# Patient Record
Sex: MALE | Race: WHITE | Employment: UNEMPLOYED | URBAN - NONMETROPOLITAN AREA
[De-identification: names, ages, dates, MRNs, and addresses within clinical notes are randomized per-mention and may not be internally consistent; named-entity substitution may affect disease eponyms.]

---

## 2019-09-20 ENCOUNTER — HOSPITAL ENCOUNTER (OUTPATIENT)
Age: 24
Discharge: HOME | End: 2019-09-20
Payer: MEDICARE

## 2019-09-20 ENCOUNTER — OFFICE VISIT (OUTPATIENT)
Dept: INTERNAL MEDICINE CLINIC | Age: 24
End: 2019-09-20
Payer: MEDICARE

## 2019-09-20 VITALS
DIASTOLIC BLOOD PRESSURE: 79 MMHG | HEIGHT: 72 IN | BODY MASS INDEX: 22.21 KG/M2 | WEIGHT: 164 LBS | SYSTOLIC BLOOD PRESSURE: 127 MMHG | HEART RATE: 99 BPM

## 2019-09-20 DIAGNOSIS — F11.20 SEVERE OPIOID USE DISORDER (HCC): ICD-10-CM

## 2019-09-20 DIAGNOSIS — F11.20 SEVERE OPIOID USE DISORDER (HCC): Primary | ICD-10-CM

## 2019-09-20 DIAGNOSIS — F32.89 OTHER DEPRESSION: ICD-10-CM

## 2019-09-20 LAB
AMPHETAMINE SCREEN, URINE: ABNORMAL
BARBITURATE SCREEN, URINE: ABNORMAL
BENZODIAZEPINE SCREEN, URINE: ABNORMAL
BUPRENORPHINE URINE: ABNORMAL
COCAINE METABOLITE SCREEN URINE: ABNORMAL
GABAPENTIN SCREEN, URINE: ABNORMAL
HEPATITIS C ANTIBODY: NEGATIVE
MDMA URINE: ABNORMAL
METHADONE SCREEN, URINE: ABNORMAL
METHAMPHETAMINE, URINE: ABNORMAL
OPIATE SCREEN URINE: ABNORMAL
OXYCODONE SCREEN URINE: ABNORMAL
PHENCYCLIDINE SCREEN URINE: ABNORMAL
PROPOXYPHENE SCREEN, URINE: ABNORMAL
THC SCREEN, URINE: ABNORMAL
TRICYCLIC ANTIDEPRESSANTS, UR: ABNORMAL

## 2019-09-20 PROCEDURE — 99204 OFFICE O/P NEW MOD 45 MIN: CPT | Performed by: INTERNAL MEDICINE

## 2019-09-20 PROCEDURE — 84403 ASSAY OF TOTAL TESTOSTERONE: CPT

## 2019-09-20 PROCEDURE — 4004F PT TOBACCO SCREEN RCVD TLK: CPT | Performed by: INTERNAL MEDICINE

## 2019-09-20 PROCEDURE — 87389 HIV-1 AG W/HIV-1&-2 AB AG IA: CPT

## 2019-09-20 PROCEDURE — 80305 DRUG TEST PRSMV DIR OPT OBS: CPT | Performed by: INTERNAL MEDICINE

## 2019-09-20 PROCEDURE — G8427 DOCREV CUR MEDS BY ELIG CLIN: HCPCS | Performed by: INTERNAL MEDICINE

## 2019-09-20 PROCEDURE — G8420 CALC BMI NORM PARAMETERS: HCPCS | Performed by: INTERNAL MEDICINE

## 2019-09-20 PROCEDURE — 86803 HEPATITIS C AB TEST: CPT

## 2019-09-20 PROCEDURE — 36415 COLL VENOUS BLD VENIPUNCTURE: CPT

## 2019-09-20 RX ORDER — BUSPIRONE HYDROCHLORIDE 15 MG/1
15 TABLET ORAL 2 TIMES DAILY
COMMUNITY
End: 2020-02-04

## 2019-09-20 RX ORDER — MIRTAZAPINE 15 MG/1
15 TABLET, FILM COATED ORAL PRN
COMMUNITY
Start: 2019-06-19 | End: 2020-01-23 | Stop reason: SDUPTHER

## 2019-09-20 RX ORDER — ESCITALOPRAM OXALATE 10 MG/1
10 TABLET ORAL DAILY
Qty: 15 TABLET | Refills: 0 | Status: SHIPPED | OUTPATIENT
Start: 2019-09-20 | End: 2019-10-24 | Stop reason: SDUPTHER

## 2019-09-20 RX ORDER — BUPROPION HYDROCHLORIDE 150 MG/1
150 TABLET ORAL DAILY
Qty: 15 TABLET | Refills: 0 | Status: SHIPPED | OUTPATIENT
Start: 2019-09-20 | End: 2019-10-24 | Stop reason: SDUPTHER

## 2019-09-22 LAB — HIV-2 AB: NEGATIVE

## 2019-09-23 LAB — TESTOSTERONE TOTAL: 365 NG/DL (ref 300–1080)

## 2019-09-26 ENCOUNTER — OFFICE VISIT (OUTPATIENT)
Dept: INTERNAL MEDICINE CLINIC | Age: 24
End: 2019-09-26
Payer: MEDICARE

## 2019-09-26 VITALS
HEIGHT: 72 IN | DIASTOLIC BLOOD PRESSURE: 75 MMHG | BODY MASS INDEX: 22.48 KG/M2 | HEART RATE: 82 BPM | SYSTOLIC BLOOD PRESSURE: 121 MMHG | WEIGHT: 166 LBS

## 2019-09-26 DIAGNOSIS — Z51.81 ENCOUNTER FOR MONITORING SUBOXONE MAINTENANCE THERAPY: ICD-10-CM

## 2019-09-26 DIAGNOSIS — Z79.899 ENCOUNTER FOR MONITORING SUBOXONE MAINTENANCE THERAPY: ICD-10-CM

## 2019-09-26 DIAGNOSIS — F11.20 SEVERE OPIOID USE DISORDER (HCC): Primary | ICD-10-CM

## 2019-09-26 DIAGNOSIS — F32.89 OTHER DEPRESSION: ICD-10-CM

## 2019-09-26 LAB
AMPHETAMINE SCREEN, URINE: ABNORMAL
BARBITURATE SCREEN, URINE: ABNORMAL
BENZODIAZEPINE SCREEN, URINE: ABNORMAL
BUPRENORPHINE URINE: ABNORMAL
COCAINE METABOLITE SCREEN URINE: ABNORMAL
GABAPENTIN SCREEN, URINE: ABNORMAL
MDMA URINE: ABNORMAL
METHADONE SCREEN, URINE: ABNORMAL
METHAMPHETAMINE, URINE: ABNORMAL
OPIATE SCREEN URINE: ABNORMAL
OXYCODONE SCREEN URINE: ABNORMAL
PHENCYCLIDINE SCREEN URINE: ABNORMAL
PROPOXYPHENE SCREEN, URINE: ABNORMAL
THC SCREEN, URINE: ABNORMAL
TRICYCLIC ANTIDEPRESSANTS, UR: ABNORMAL

## 2019-09-26 PROCEDURE — 4004F PT TOBACCO SCREEN RCVD TLK: CPT | Performed by: INTERNAL MEDICINE

## 2019-09-26 PROCEDURE — G8427 DOCREV CUR MEDS BY ELIG CLIN: HCPCS | Performed by: INTERNAL MEDICINE

## 2019-09-26 PROCEDURE — G8420 CALC BMI NORM PARAMETERS: HCPCS | Performed by: INTERNAL MEDICINE

## 2019-09-26 PROCEDURE — 80305 DRUG TEST PRSMV DIR OPT OBS: CPT | Performed by: INTERNAL MEDICINE

## 2019-09-26 PROCEDURE — 99213 OFFICE O/P EST LOW 20 MIN: CPT | Performed by: INTERNAL MEDICINE

## 2019-09-26 RX ORDER — BUPRENORPHINE AND NALOXONE 12; 3 MG/1; MG/1
1 FILM, SOLUBLE BUCCAL; SUBLINGUAL DAILY
Qty: 7 FILM | Refills: 0 | Status: SHIPPED | OUTPATIENT
Start: 2019-09-26 | End: 2019-10-03 | Stop reason: SDUPTHER

## 2019-09-26 RX ORDER — BUPRENORPHINE AND NALOXONE 12; 3 MG/1; MG/1
1 FILM, SOLUBLE BUCCAL; SUBLINGUAL DAILY
COMMUNITY
End: 2019-09-26 | Stop reason: SDUPTHER

## 2019-10-03 ENCOUNTER — OFFICE VISIT (OUTPATIENT)
Dept: INTERNAL MEDICINE CLINIC | Age: 24
End: 2019-10-03
Payer: MEDICARE

## 2019-10-03 VITALS
BODY MASS INDEX: 23.7 KG/M2 | HEIGHT: 72 IN | DIASTOLIC BLOOD PRESSURE: 89 MMHG | HEART RATE: 79 BPM | WEIGHT: 175 LBS | SYSTOLIC BLOOD PRESSURE: 150 MMHG

## 2019-10-03 DIAGNOSIS — Z79.899 ENCOUNTER FOR MONITORING SUBOXONE MAINTENANCE THERAPY: ICD-10-CM

## 2019-10-03 DIAGNOSIS — F11.20 SEVERE OPIOID USE DISORDER (HCC): Primary | ICD-10-CM

## 2019-10-03 DIAGNOSIS — Z51.81 ENCOUNTER FOR MONITORING SUBOXONE MAINTENANCE THERAPY: ICD-10-CM

## 2019-10-03 PROCEDURE — G8427 DOCREV CUR MEDS BY ELIG CLIN: HCPCS | Performed by: INTERNAL MEDICINE

## 2019-10-03 PROCEDURE — G8484 FLU IMMUNIZE NO ADMIN: HCPCS | Performed by: INTERNAL MEDICINE

## 2019-10-03 PROCEDURE — 80305 DRUG TEST PRSMV DIR OPT OBS: CPT | Performed by: INTERNAL MEDICINE

## 2019-10-03 PROCEDURE — G8420 CALC BMI NORM PARAMETERS: HCPCS | Performed by: INTERNAL MEDICINE

## 2019-10-03 PROCEDURE — 4004F PT TOBACCO SCREEN RCVD TLK: CPT | Performed by: INTERNAL MEDICINE

## 2019-10-03 PROCEDURE — 99213 OFFICE O/P EST LOW 20 MIN: CPT | Performed by: INTERNAL MEDICINE

## 2019-10-03 RX ORDER — BUPRENORPHINE AND NALOXONE 12; 3 MG/1; MG/1
1 FILM, SOLUBLE BUCCAL; SUBLINGUAL DAILY
Qty: 7 FILM | Refills: 0 | Status: SHIPPED | OUTPATIENT
Start: 2019-10-03 | End: 2019-10-10 | Stop reason: SDUPTHER

## 2019-10-10 ENCOUNTER — OFFICE VISIT (OUTPATIENT)
Dept: INTERNAL MEDICINE CLINIC | Age: 24
End: 2019-10-10
Payer: MEDICARE

## 2019-10-10 VITALS
SYSTOLIC BLOOD PRESSURE: 137 MMHG | HEART RATE: 92 BPM | WEIGHT: 167 LBS | BODY MASS INDEX: 22.62 KG/M2 | HEIGHT: 72 IN | DIASTOLIC BLOOD PRESSURE: 75 MMHG

## 2019-10-10 DIAGNOSIS — F11.20 SEVERE OPIOID USE DISORDER (HCC): Primary | ICD-10-CM

## 2019-10-10 DIAGNOSIS — Z51.81 ENCOUNTER FOR MONITORING SUBOXONE MAINTENANCE THERAPY: ICD-10-CM

## 2019-10-10 DIAGNOSIS — Z79.899 ENCOUNTER FOR MONITORING SUBOXONE MAINTENANCE THERAPY: ICD-10-CM

## 2019-10-10 PROCEDURE — 99213 OFFICE O/P EST LOW 20 MIN: CPT | Performed by: INTERNAL MEDICINE

## 2019-10-10 PROCEDURE — G8420 CALC BMI NORM PARAMETERS: HCPCS | Performed by: INTERNAL MEDICINE

## 2019-10-10 PROCEDURE — G8428 CUR MEDS NOT DOCUMENT: HCPCS | Performed by: INTERNAL MEDICINE

## 2019-10-10 PROCEDURE — 80305 DRUG TEST PRSMV DIR OPT OBS: CPT | Performed by: INTERNAL MEDICINE

## 2019-10-10 PROCEDURE — 4004F PT TOBACCO SCREEN RCVD TLK: CPT | Performed by: INTERNAL MEDICINE

## 2019-10-10 PROCEDURE — G8484 FLU IMMUNIZE NO ADMIN: HCPCS | Performed by: INTERNAL MEDICINE

## 2019-10-10 RX ORDER — BUPRENORPHINE AND NALOXONE 12; 3 MG/1; MG/1
1 FILM, SOLUBLE BUCCAL; SUBLINGUAL DAILY
Qty: 7 FILM | Refills: 0 | Status: SHIPPED | OUTPATIENT
Start: 2019-10-10 | End: 2019-10-17 | Stop reason: SDUPTHER

## 2019-10-17 ENCOUNTER — OFFICE VISIT (OUTPATIENT)
Dept: INTERNAL MEDICINE CLINIC | Age: 24
End: 2019-10-17
Payer: MEDICARE

## 2019-10-17 VITALS
WEIGHT: 172 LBS | BODY MASS INDEX: 23.3 KG/M2 | SYSTOLIC BLOOD PRESSURE: 135 MMHG | HEART RATE: 76 BPM | HEIGHT: 72 IN | DIASTOLIC BLOOD PRESSURE: 80 MMHG

## 2019-10-17 DIAGNOSIS — F11.20 SEVERE OPIOID USE DISORDER (HCC): Primary | ICD-10-CM

## 2019-10-17 DIAGNOSIS — Z51.81 ENCOUNTER FOR MONITORING SUBOXONE MAINTENANCE THERAPY: ICD-10-CM

## 2019-10-17 DIAGNOSIS — Z79.899 ENCOUNTER FOR MONITORING SUBOXONE MAINTENANCE THERAPY: ICD-10-CM

## 2019-10-17 DIAGNOSIS — F19.10 POLYSUBSTANCE ABUSE (HCC): ICD-10-CM

## 2019-10-17 PROCEDURE — 4004F PT TOBACCO SCREEN RCVD TLK: CPT | Performed by: INTERNAL MEDICINE

## 2019-10-17 PROCEDURE — G8427 DOCREV CUR MEDS BY ELIG CLIN: HCPCS | Performed by: INTERNAL MEDICINE

## 2019-10-17 PROCEDURE — 80305 DRUG TEST PRSMV DIR OPT OBS: CPT | Performed by: INTERNAL MEDICINE

## 2019-10-17 PROCEDURE — 99213 OFFICE O/P EST LOW 20 MIN: CPT | Performed by: INTERNAL MEDICINE

## 2019-10-17 PROCEDURE — G8484 FLU IMMUNIZE NO ADMIN: HCPCS | Performed by: INTERNAL MEDICINE

## 2019-10-17 PROCEDURE — G8420 CALC BMI NORM PARAMETERS: HCPCS | Performed by: INTERNAL MEDICINE

## 2019-10-17 RX ORDER — BUPRENORPHINE AND NALOXONE 12; 3 MG/1; MG/1
1 FILM, SOLUBLE BUCCAL; SUBLINGUAL DAILY
Qty: 4 FILM | Refills: 0 | Status: SHIPPED | OUTPATIENT
Start: 2019-10-17 | End: 2019-10-21

## 2019-10-24 ENCOUNTER — OFFICE VISIT (OUTPATIENT)
Dept: INTERNAL MEDICINE CLINIC | Age: 24
End: 2019-10-24
Payer: MEDICARE

## 2019-10-24 VITALS
HEART RATE: 84 BPM | WEIGHT: 171 LBS | BODY MASS INDEX: 23.16 KG/M2 | DIASTOLIC BLOOD PRESSURE: 67 MMHG | SYSTOLIC BLOOD PRESSURE: 114 MMHG | HEIGHT: 72 IN

## 2019-10-24 DIAGNOSIS — Z79.899 ENCOUNTER FOR MONITORING SUBOXONE MAINTENANCE THERAPY: ICD-10-CM

## 2019-10-24 DIAGNOSIS — F11.20 SEVERE OPIOID USE DISORDER (HCC): Primary | ICD-10-CM

## 2019-10-24 DIAGNOSIS — F32.89 OTHER DEPRESSION: ICD-10-CM

## 2019-10-24 DIAGNOSIS — F19.10 POLYSUBSTANCE ABUSE (HCC): ICD-10-CM

## 2019-10-24 DIAGNOSIS — Z51.81 ENCOUNTER FOR MONITORING SUBOXONE MAINTENANCE THERAPY: ICD-10-CM

## 2019-10-24 PROCEDURE — 99213 OFFICE O/P EST LOW 20 MIN: CPT | Performed by: INTERNAL MEDICINE

## 2019-10-24 PROCEDURE — 4004F PT TOBACCO SCREEN RCVD TLK: CPT | Performed by: INTERNAL MEDICINE

## 2019-10-24 PROCEDURE — G8484 FLU IMMUNIZE NO ADMIN: HCPCS | Performed by: INTERNAL MEDICINE

## 2019-10-24 PROCEDURE — G8427 DOCREV CUR MEDS BY ELIG CLIN: HCPCS | Performed by: INTERNAL MEDICINE

## 2019-10-24 PROCEDURE — 80305 DRUG TEST PRSMV DIR OPT OBS: CPT | Performed by: INTERNAL MEDICINE

## 2019-10-24 PROCEDURE — G8420 CALC BMI NORM PARAMETERS: HCPCS | Performed by: INTERNAL MEDICINE

## 2019-10-24 RX ORDER — BUPROPION HYDROCHLORIDE 150 MG/1
150 TABLET ORAL DAILY
Qty: 15 TABLET | Refills: 0 | Status: SHIPPED | OUTPATIENT
Start: 2019-10-24 | End: 2019-11-27 | Stop reason: SDUPTHER

## 2019-10-24 RX ORDER — BUPRENORPHINE AND NALOXONE 12; 3 MG/1; MG/1
1 FILM, SOLUBLE BUCCAL; SUBLINGUAL DAILY
COMMUNITY
End: 2019-10-24 | Stop reason: SDUPTHER

## 2019-10-24 RX ORDER — ESCITALOPRAM OXALATE 10 MG/1
10 TABLET ORAL DAILY
Qty: 15 TABLET | Refills: 0 | Status: SHIPPED | OUTPATIENT
Start: 2019-10-24 | End: 2019-11-27 | Stop reason: SDUPTHER

## 2019-10-24 RX ORDER — BUPRENORPHINE AND NALOXONE 12; 3 MG/1; MG/1
1 FILM, SOLUBLE BUCCAL; SUBLINGUAL DAILY
Qty: 7 FILM | Refills: 0 | Status: SHIPPED | OUTPATIENT
Start: 2019-10-24 | End: 2019-10-31 | Stop reason: SDUPTHER

## 2019-10-31 ENCOUNTER — OFFICE VISIT (OUTPATIENT)
Dept: INTERNAL MEDICINE CLINIC | Age: 24
End: 2019-10-31
Payer: MEDICARE

## 2019-10-31 VITALS
BODY MASS INDEX: 24.24 KG/M2 | DIASTOLIC BLOOD PRESSURE: 77 MMHG | HEIGHT: 72 IN | SYSTOLIC BLOOD PRESSURE: 133 MMHG | HEART RATE: 101 BPM | WEIGHT: 179 LBS

## 2019-10-31 DIAGNOSIS — Z51.81 ENCOUNTER FOR MONITORING SUBOXONE MAINTENANCE THERAPY: ICD-10-CM

## 2019-10-31 DIAGNOSIS — F11.20 SEVERE OPIOID USE DISORDER (HCC): Primary | ICD-10-CM

## 2019-10-31 DIAGNOSIS — F19.10 POLYSUBSTANCE ABUSE (HCC): ICD-10-CM

## 2019-10-31 DIAGNOSIS — Z79.899 ENCOUNTER FOR MONITORING SUBOXONE MAINTENANCE THERAPY: ICD-10-CM

## 2019-10-31 PROCEDURE — G8484 FLU IMMUNIZE NO ADMIN: HCPCS | Performed by: INTERNAL MEDICINE

## 2019-10-31 PROCEDURE — 4004F PT TOBACCO SCREEN RCVD TLK: CPT | Performed by: INTERNAL MEDICINE

## 2019-10-31 PROCEDURE — G8420 CALC BMI NORM PARAMETERS: HCPCS | Performed by: INTERNAL MEDICINE

## 2019-10-31 PROCEDURE — 99213 OFFICE O/P EST LOW 20 MIN: CPT | Performed by: INTERNAL MEDICINE

## 2019-10-31 PROCEDURE — 80305 DRUG TEST PRSMV DIR OPT OBS: CPT | Performed by: INTERNAL MEDICINE

## 2019-10-31 PROCEDURE — G8427 DOCREV CUR MEDS BY ELIG CLIN: HCPCS | Performed by: INTERNAL MEDICINE

## 2019-10-31 RX ORDER — BUPRENORPHINE AND NALOXONE 12; 3 MG/1; MG/1
1 FILM, SOLUBLE BUCCAL; SUBLINGUAL DAILY
Qty: 7 FILM | Refills: 0 | Status: SHIPPED | OUTPATIENT
Start: 2019-10-31 | End: 2019-10-31

## 2019-10-31 RX ORDER — BUPRENORPHINE AND NALOXONE 12; 3 MG/1; MG/1
1 FILM, SOLUBLE BUCCAL; SUBLINGUAL DAILY
Qty: 7 FILM | Refills: 0 | Status: SHIPPED | OUTPATIENT
Start: 2019-10-31 | End: 2019-11-07

## 2019-11-06 ENCOUNTER — TELEPHONE (OUTPATIENT)
Dept: INTERNAL MEDICINE CLINIC | Age: 24
End: 2019-11-06

## 2019-11-07 DIAGNOSIS — F11.20 SEVERE OPIOID USE DISORDER (HCC): Primary | ICD-10-CM

## 2019-11-07 DIAGNOSIS — F41.9 ANXIETY: ICD-10-CM

## 2019-11-07 RX ORDER — HYDROXYZINE HYDROCHLORIDE 25 MG/1
25 TABLET, FILM COATED ORAL EVERY 4 HOURS PRN
Qty: 20 TABLET | Refills: 0 | OUTPATIENT
Start: 2019-11-07 | End: 2019-11-27

## 2019-11-08 ENCOUNTER — TELEPHONE (OUTPATIENT)
Dept: INTERNAL MEDICINE CLINIC | Age: 24
End: 2019-11-08

## 2019-11-11 ENCOUNTER — OFFICE VISIT (OUTPATIENT)
Dept: INTERNAL MEDICINE CLINIC | Age: 24
End: 2019-11-11
Payer: MEDICARE

## 2019-11-11 VITALS
HEART RATE: 84 BPM | SYSTOLIC BLOOD PRESSURE: 134 MMHG | BODY MASS INDEX: 23.06 KG/M2 | WEIGHT: 170 LBS | DIASTOLIC BLOOD PRESSURE: 87 MMHG

## 2019-11-11 DIAGNOSIS — F11.20 SEVERE OPIOID USE DISORDER (HCC): Primary | ICD-10-CM

## 2019-11-11 DIAGNOSIS — Z51.81 ENCOUNTER FOR MONITORING SUBOXONE MAINTENANCE THERAPY: ICD-10-CM

## 2019-11-11 DIAGNOSIS — F41.9 ANXIETY: ICD-10-CM

## 2019-11-11 DIAGNOSIS — Z79.899 ENCOUNTER FOR MONITORING SUBOXONE MAINTENANCE THERAPY: ICD-10-CM

## 2019-11-11 DIAGNOSIS — F19.10 POLYSUBSTANCE ABUSE (HCC): ICD-10-CM

## 2019-11-11 PROCEDURE — 4004F PT TOBACCO SCREEN RCVD TLK: CPT | Performed by: INTERNAL MEDICINE

## 2019-11-11 PROCEDURE — G8427 DOCREV CUR MEDS BY ELIG CLIN: HCPCS | Performed by: INTERNAL MEDICINE

## 2019-11-11 PROCEDURE — G8420 CALC BMI NORM PARAMETERS: HCPCS | Performed by: INTERNAL MEDICINE

## 2019-11-11 PROCEDURE — G8484 FLU IMMUNIZE NO ADMIN: HCPCS | Performed by: INTERNAL MEDICINE

## 2019-11-11 PROCEDURE — 99213 OFFICE O/P EST LOW 20 MIN: CPT | Performed by: INTERNAL MEDICINE

## 2019-11-11 PROCEDURE — 80305 DRUG TEST PRSMV DIR OPT OBS: CPT | Performed by: INTERNAL MEDICINE

## 2019-11-11 RX ORDER — BUPRENORPHINE AND NALOXONE 8; 2 MG/1; MG/1
1 FILM, SOLUBLE BUCCAL; SUBLINGUAL 2 TIMES DAILY
Qty: 14 FILM | Refills: 0 | Status: SHIPPED | OUTPATIENT
Start: 2019-11-11 | End: 2019-11-18

## 2019-11-11 RX ORDER — BUPRENORPHINE AND NALOXONE 8; 2 MG/1; MG/1
FILM, SOLUBLE BUCCAL; SUBLINGUAL
Refills: 0 | COMMUNITY
Start: 2019-11-06 | End: 2019-11-11 | Stop reason: SDUPTHER

## 2019-11-11 RX ORDER — BUPRENORPHINE AND NALOXONE 8; 2 MG/1; MG/1
1 FILM, SOLUBLE BUCCAL; SUBLINGUAL 2 TIMES DAILY
Qty: 14 FILM | Refills: 0 | Status: SHIPPED | OUTPATIENT
Start: 2019-11-11 | End: 2019-11-11

## 2019-11-15 LAB
BUPRENORPHINE GLUCURONIDE URINE: 151 NG/ML
BUPRENORPHINE URINE: < 2 NG/ML
NALOXONE URINE: < 100 NG/ML
NORBUPRENORPHINE GLUCURONIDE URINE: 442 NG/ML
NORBUPRENORPHINE, URINE: 112 NG/ML

## 2019-11-19 ENCOUNTER — OFFICE VISIT (OUTPATIENT)
Dept: INTERNAL MEDICINE CLINIC | Age: 24
End: 2019-11-19
Payer: MEDICARE

## 2019-11-19 VITALS
BODY MASS INDEX: 22.62 KG/M2 | SYSTOLIC BLOOD PRESSURE: 138 MMHG | DIASTOLIC BLOOD PRESSURE: 88 MMHG | WEIGHT: 167 LBS | HEART RATE: 109 BPM | HEIGHT: 72 IN

## 2019-11-19 DIAGNOSIS — Z79.899 ENCOUNTER FOR MONITORING SUBOXONE MAINTENANCE THERAPY: ICD-10-CM

## 2019-11-19 DIAGNOSIS — F19.10 POLYSUBSTANCE ABUSE (HCC): ICD-10-CM

## 2019-11-19 DIAGNOSIS — F41.9 ANXIETY: ICD-10-CM

## 2019-11-19 DIAGNOSIS — F11.20 SEVERE OPIOID USE DISORDER (HCC): Primary | ICD-10-CM

## 2019-11-19 DIAGNOSIS — Z51.81 ENCOUNTER FOR MONITORING SUBOXONE MAINTENANCE THERAPY: ICD-10-CM

## 2019-11-19 PROCEDURE — 80305 DRUG TEST PRSMV DIR OPT OBS: CPT | Performed by: INTERNAL MEDICINE

## 2019-11-19 PROCEDURE — G8427 DOCREV CUR MEDS BY ELIG CLIN: HCPCS | Performed by: INTERNAL MEDICINE

## 2019-11-19 PROCEDURE — 99213 OFFICE O/P EST LOW 20 MIN: CPT | Performed by: INTERNAL MEDICINE

## 2019-11-19 PROCEDURE — G8484 FLU IMMUNIZE NO ADMIN: HCPCS | Performed by: INTERNAL MEDICINE

## 2019-11-19 PROCEDURE — G8420 CALC BMI NORM PARAMETERS: HCPCS | Performed by: INTERNAL MEDICINE

## 2019-11-19 PROCEDURE — 4004F PT TOBACCO SCREEN RCVD TLK: CPT | Performed by: INTERNAL MEDICINE

## 2019-11-19 RX ORDER — BUPRENORPHINE AND NALOXONE 8; 2 MG/1; MG/1
1 FILM, SOLUBLE BUCCAL; SUBLINGUAL 2 TIMES DAILY
COMMUNITY
End: 2019-11-19 | Stop reason: SDUPTHER

## 2019-11-19 RX ORDER — BUPRENORPHINE AND NALOXONE 8; 2 MG/1; MG/1
1 FILM, SOLUBLE BUCCAL; SUBLINGUAL 2 TIMES DAILY
Qty: 14 FILM | Refills: 0 | Status: SHIPPED | OUTPATIENT
Start: 2019-11-19 | End: 2019-11-26

## 2019-11-27 ENCOUNTER — OFFICE VISIT (OUTPATIENT)
Dept: INTERNAL MEDICINE CLINIC | Age: 24
End: 2019-11-27
Payer: MEDICARE

## 2019-11-27 VITALS
HEIGHT: 72 IN | SYSTOLIC BLOOD PRESSURE: 132 MMHG | HEART RATE: 94 BPM | BODY MASS INDEX: 23.7 KG/M2 | WEIGHT: 175 LBS | DIASTOLIC BLOOD PRESSURE: 74 MMHG

## 2019-11-27 DIAGNOSIS — Z79.899 ENCOUNTER FOR MONITORING SUBOXONE MAINTENANCE THERAPY: ICD-10-CM

## 2019-11-27 DIAGNOSIS — F32.89 OTHER DEPRESSION: ICD-10-CM

## 2019-11-27 DIAGNOSIS — F11.20 SEVERE OPIOID USE DISORDER (HCC): Primary | ICD-10-CM

## 2019-11-27 DIAGNOSIS — Z51.81 ENCOUNTER FOR MONITORING SUBOXONE MAINTENANCE THERAPY: ICD-10-CM

## 2019-11-27 DIAGNOSIS — F19.10 POLYSUBSTANCE ABUSE (HCC): ICD-10-CM

## 2019-11-27 DIAGNOSIS — F41.9 ANXIETY: ICD-10-CM

## 2019-11-27 PROCEDURE — 99213 OFFICE O/P EST LOW 20 MIN: CPT | Performed by: INTERNAL MEDICINE

## 2019-11-27 PROCEDURE — G8420 CALC BMI NORM PARAMETERS: HCPCS | Performed by: INTERNAL MEDICINE

## 2019-11-27 PROCEDURE — 80305 DRUG TEST PRSMV DIR OPT OBS: CPT | Performed by: INTERNAL MEDICINE

## 2019-11-27 PROCEDURE — G8427 DOCREV CUR MEDS BY ELIG CLIN: HCPCS | Performed by: INTERNAL MEDICINE

## 2019-11-27 PROCEDURE — G8484 FLU IMMUNIZE NO ADMIN: HCPCS | Performed by: INTERNAL MEDICINE

## 2019-11-27 PROCEDURE — 4004F PT TOBACCO SCREEN RCVD TLK: CPT | Performed by: INTERNAL MEDICINE

## 2019-11-27 RX ORDER — BUPRENORPHINE AND NALOXONE 8; 2 MG/1; MG/1
1 FILM, SOLUBLE BUCCAL; SUBLINGUAL 2 TIMES DAILY
Qty: 24 FILM | Refills: 0 | Status: SHIPPED | OUTPATIENT
Start: 2019-11-27 | End: 2019-12-09

## 2019-11-27 RX ORDER — BUPROPION HYDROCHLORIDE 150 MG/1
150 TABLET ORAL DAILY
Qty: 15 TABLET | Refills: 0 | Status: SHIPPED | OUTPATIENT
Start: 2019-11-27 | End: 2019-12-26 | Stop reason: SDUPTHER

## 2019-11-27 RX ORDER — ESCITALOPRAM OXALATE 10 MG/1
10 TABLET ORAL DAILY
Qty: 15 TABLET | Refills: 0 | Status: SHIPPED | OUTPATIENT
Start: 2019-11-27 | End: 2019-12-26 | Stop reason: SDUPTHER

## 2019-11-27 RX ORDER — BUPRENORPHINE AND NALOXONE 8; 2 MG/1; MG/1
1 FILM, SOLUBLE BUCCAL; SUBLINGUAL 2 TIMES DAILY
COMMUNITY
End: 2019-11-27 | Stop reason: SDUPTHER

## 2019-12-11 ENCOUNTER — OFFICE VISIT (OUTPATIENT)
Dept: INTERNAL MEDICINE CLINIC | Age: 24
End: 2019-12-11
Payer: MEDICARE

## 2019-12-11 VITALS
SYSTOLIC BLOOD PRESSURE: 132 MMHG | BODY MASS INDEX: 23.03 KG/M2 | HEART RATE: 98 BPM | HEIGHT: 72 IN | WEIGHT: 170 LBS | DIASTOLIC BLOOD PRESSURE: 78 MMHG

## 2019-12-11 DIAGNOSIS — Z51.81 ENCOUNTER FOR MONITORING SUBOXONE MAINTENANCE THERAPY: ICD-10-CM

## 2019-12-11 DIAGNOSIS — F11.20 SEVERE OPIOID USE DISORDER (HCC): Primary | ICD-10-CM

## 2019-12-11 DIAGNOSIS — Z79.899 ENCOUNTER FOR MONITORING SUBOXONE MAINTENANCE THERAPY: ICD-10-CM

## 2019-12-11 DIAGNOSIS — F19.10 POLYSUBSTANCE ABUSE (HCC): ICD-10-CM

## 2019-12-11 PROCEDURE — 4004F PT TOBACCO SCREEN RCVD TLK: CPT | Performed by: INTERNAL MEDICINE

## 2019-12-11 PROCEDURE — G8484 FLU IMMUNIZE NO ADMIN: HCPCS | Performed by: INTERNAL MEDICINE

## 2019-12-11 PROCEDURE — 80305 DRUG TEST PRSMV DIR OPT OBS: CPT | Performed by: INTERNAL MEDICINE

## 2019-12-11 PROCEDURE — 99213 OFFICE O/P EST LOW 20 MIN: CPT | Performed by: INTERNAL MEDICINE

## 2019-12-11 PROCEDURE — G8420 CALC BMI NORM PARAMETERS: HCPCS | Performed by: INTERNAL MEDICINE

## 2019-12-11 PROCEDURE — G8427 DOCREV CUR MEDS BY ELIG CLIN: HCPCS | Performed by: INTERNAL MEDICINE

## 2019-12-11 RX ORDER — BUPRENORPHINE AND NALOXONE 8; 2 MG/1; MG/1
1 FILM, SOLUBLE BUCCAL; SUBLINGUAL 2 TIMES DAILY
COMMUNITY
End: 2019-12-11 | Stop reason: SDUPTHER

## 2019-12-11 RX ORDER — BUPRENORPHINE AND NALOXONE 8; 2 MG/1; MG/1
1 FILM, SOLUBLE BUCCAL; SUBLINGUAL 2 TIMES DAILY
Qty: 30 FILM | Refills: 0 | Status: SHIPPED | OUTPATIENT
Start: 2019-12-11 | End: 2019-12-26 | Stop reason: SDUPTHER

## 2019-12-26 ENCOUNTER — OFFICE VISIT (OUTPATIENT)
Dept: INTERNAL MEDICINE CLINIC | Age: 24
End: 2019-12-26
Payer: MEDICARE

## 2019-12-26 DIAGNOSIS — Z79.899 ENCOUNTER FOR MONITORING SUBOXONE MAINTENANCE THERAPY: ICD-10-CM

## 2019-12-26 DIAGNOSIS — F19.10 POLYSUBSTANCE ABUSE (HCC): ICD-10-CM

## 2019-12-26 DIAGNOSIS — F32.89 OTHER DEPRESSION: ICD-10-CM

## 2019-12-26 DIAGNOSIS — F11.20 SEVERE OPIOID USE DISORDER (HCC): Primary | ICD-10-CM

## 2019-12-26 DIAGNOSIS — Z51.81 ENCOUNTER FOR MONITORING SUBOXONE MAINTENANCE THERAPY: ICD-10-CM

## 2019-12-26 PROCEDURE — G8484 FLU IMMUNIZE NO ADMIN: HCPCS | Performed by: INTERNAL MEDICINE

## 2019-12-26 PROCEDURE — 4004F PT TOBACCO SCREEN RCVD TLK: CPT | Performed by: INTERNAL MEDICINE

## 2019-12-26 PROCEDURE — 80305 DRUG TEST PRSMV DIR OPT OBS: CPT | Performed by: INTERNAL MEDICINE

## 2019-12-26 PROCEDURE — G8427 DOCREV CUR MEDS BY ELIG CLIN: HCPCS | Performed by: INTERNAL MEDICINE

## 2019-12-26 PROCEDURE — 99213 OFFICE O/P EST LOW 20 MIN: CPT | Performed by: INTERNAL MEDICINE

## 2019-12-26 PROCEDURE — G8420 CALC BMI NORM PARAMETERS: HCPCS | Performed by: INTERNAL MEDICINE

## 2019-12-26 RX ORDER — ESCITALOPRAM OXALATE 10 MG/1
10 TABLET ORAL DAILY
Qty: 30 TABLET | Refills: 0 | Status: SHIPPED | OUTPATIENT
Start: 2019-12-26 | End: 2020-02-04 | Stop reason: SDUPTHER

## 2019-12-26 RX ORDER — BUPRENORPHINE AND NALOXONE 8; 2 MG/1; MG/1
1 FILM, SOLUBLE BUCCAL; SUBLINGUAL 2 TIMES DAILY
Qty: 14 FILM | Refills: 0 | Status: SHIPPED | OUTPATIENT
Start: 2019-12-26 | End: 2020-01-02 | Stop reason: SDUPTHER

## 2019-12-26 RX ORDER — BUPROPION HYDROCHLORIDE 150 MG/1
150 TABLET ORAL DAILY
Qty: 30 TABLET | Refills: 0 | Status: SHIPPED | OUTPATIENT
Start: 2019-12-26 | End: 2020-02-04 | Stop reason: SDUPTHER

## 2020-01-02 ENCOUNTER — OFFICE VISIT (OUTPATIENT)
Dept: INTERNAL MEDICINE CLINIC | Age: 25
End: 2020-01-02
Payer: MEDICARE

## 2020-01-02 VITALS
HEART RATE: 103 BPM | HEIGHT: 72 IN | WEIGHT: 166 LBS | SYSTOLIC BLOOD PRESSURE: 139 MMHG | BODY MASS INDEX: 22.48 KG/M2 | DIASTOLIC BLOOD PRESSURE: 82 MMHG

## 2020-01-02 PROCEDURE — G8484 FLU IMMUNIZE NO ADMIN: HCPCS | Performed by: INTERNAL MEDICINE

## 2020-01-02 PROCEDURE — 99213 OFFICE O/P EST LOW 20 MIN: CPT | Performed by: INTERNAL MEDICINE

## 2020-01-02 PROCEDURE — 80305 DRUG TEST PRSMV DIR OPT OBS: CPT | Performed by: INTERNAL MEDICINE

## 2020-01-02 PROCEDURE — 4004F PT TOBACCO SCREEN RCVD TLK: CPT | Performed by: INTERNAL MEDICINE

## 2020-01-02 PROCEDURE — G8420 CALC BMI NORM PARAMETERS: HCPCS | Performed by: INTERNAL MEDICINE

## 2020-01-02 PROCEDURE — G8428 CUR MEDS NOT DOCUMENT: HCPCS | Performed by: INTERNAL MEDICINE

## 2020-01-02 RX ORDER — BUPRENORPHINE AND NALOXONE 8; 2 MG/1; MG/1
1 FILM, SOLUBLE BUCCAL; SUBLINGUAL 2 TIMES DAILY
Qty: 14 FILM | Refills: 0 | Status: SHIPPED | OUTPATIENT
Start: 2020-01-02 | End: 2020-01-09 | Stop reason: SDUPTHER

## 2020-01-09 ENCOUNTER — OFFICE VISIT (OUTPATIENT)
Dept: INTERNAL MEDICINE CLINIC | Age: 25
End: 2020-01-09
Payer: MEDICARE

## 2020-01-09 VITALS
HEIGHT: 72 IN | WEIGHT: 171 LBS | SYSTOLIC BLOOD PRESSURE: 134 MMHG | BODY MASS INDEX: 23.16 KG/M2 | HEART RATE: 101 BPM | DIASTOLIC BLOOD PRESSURE: 72 MMHG

## 2020-01-09 PROCEDURE — 4004F PT TOBACCO SCREEN RCVD TLK: CPT | Performed by: INTERNAL MEDICINE

## 2020-01-09 PROCEDURE — 99213 OFFICE O/P EST LOW 20 MIN: CPT | Performed by: INTERNAL MEDICINE

## 2020-01-09 PROCEDURE — 80305 DRUG TEST PRSMV DIR OPT OBS: CPT | Performed by: INTERNAL MEDICINE

## 2020-01-09 PROCEDURE — G8420 CALC BMI NORM PARAMETERS: HCPCS | Performed by: INTERNAL MEDICINE

## 2020-01-09 PROCEDURE — G8427 DOCREV CUR MEDS BY ELIG CLIN: HCPCS | Performed by: INTERNAL MEDICINE

## 2020-01-09 PROCEDURE — G8484 FLU IMMUNIZE NO ADMIN: HCPCS | Performed by: INTERNAL MEDICINE

## 2020-01-09 RX ORDER — BUPRENORPHINE AND NALOXONE 8; 2 MG/1; MG/1
1 FILM, SOLUBLE BUCCAL; SUBLINGUAL 2 TIMES DAILY
Qty: 14 FILM | Refills: 0 | Status: SHIPPED | OUTPATIENT
Start: 2020-01-09 | End: 2020-01-16 | Stop reason: SDUPTHER

## 2020-01-16 ENCOUNTER — OFFICE VISIT (OUTPATIENT)
Dept: INTERNAL MEDICINE CLINIC | Age: 25
End: 2020-01-16
Payer: MEDICARE

## 2020-01-16 VITALS
BODY MASS INDEX: 24.11 KG/M2 | HEART RATE: 87 BPM | WEIGHT: 178 LBS | SYSTOLIC BLOOD PRESSURE: 126 MMHG | HEIGHT: 72 IN | DIASTOLIC BLOOD PRESSURE: 66 MMHG

## 2020-01-16 PROCEDURE — 4004F PT TOBACCO SCREEN RCVD TLK: CPT | Performed by: INTERNAL MEDICINE

## 2020-01-16 PROCEDURE — G8427 DOCREV CUR MEDS BY ELIG CLIN: HCPCS | Performed by: INTERNAL MEDICINE

## 2020-01-16 PROCEDURE — 99213 OFFICE O/P EST LOW 20 MIN: CPT | Performed by: INTERNAL MEDICINE

## 2020-01-16 PROCEDURE — G8484 FLU IMMUNIZE NO ADMIN: HCPCS | Performed by: INTERNAL MEDICINE

## 2020-01-16 PROCEDURE — 80305 DRUG TEST PRSMV DIR OPT OBS: CPT | Performed by: INTERNAL MEDICINE

## 2020-01-16 PROCEDURE — G8420 CALC BMI NORM PARAMETERS: HCPCS | Performed by: INTERNAL MEDICINE

## 2020-01-16 RX ORDER — BUPRENORPHINE AND NALOXONE 8; 2 MG/1; MG/1
1 FILM, SOLUBLE BUCCAL; SUBLINGUAL 2 TIMES DAILY
Qty: 14 FILM | Refills: 0 | Status: SHIPPED | OUTPATIENT
Start: 2020-01-16 | End: 2020-01-23 | Stop reason: SDUPTHER

## 2020-01-16 NOTE — PROGRESS NOTES
Urine 01/16/2020  9:59 AM Unknown   NEG    Methadone Screen, Urine 01/16/2020  9:59 AM Unknown   NEG    Opiate Scrn, Ur 01/16/2020  9:59 AM Unknown   NEG    Oxycodone Screen, Ur 01/16/2020  9:59 AM Unknown   NEG    PCP Screen, Urine 01/16/2020  9:59 AM Unknown   NEG    Propoxyphene Screen, Urine 01/16/2020  9:59 AM Unknown   N/A    THC Screen, Urine 01/16/2020  9:59 AM Unknown   NEG    Tricyclic Antidepressants, Urine 01/16/2020  9:59 AM Unknown   N/A        PLAN:  Last visit I told the patient I did not believe his story  He admitted to using a combination of meth and THC with a friend 4 weeks ago  His urine is negative for meth today  Comprehensive urine from 12/11 is positive for just about everything  There is hydroxy alprazolam nor diazepam oxazepam temazepam cocaine amphetamines methamphetamines dextromethorphan  Neurontin and Lyrica  Urine from 1/2 showed citalopram bupropion Neurontin Lyrica THC    I really question his commitment to getting clean  Follow-up 1/23

## 2020-01-23 ENCOUNTER — OFFICE VISIT (OUTPATIENT)
Dept: INTERNAL MEDICINE CLINIC | Age: 25
End: 2020-01-23
Payer: MEDICARE

## 2020-01-23 VITALS
WEIGHT: 169 LBS | HEIGHT: 72 IN | DIASTOLIC BLOOD PRESSURE: 81 MMHG | BODY MASS INDEX: 22.89 KG/M2 | HEART RATE: 97 BPM | SYSTOLIC BLOOD PRESSURE: 127 MMHG

## 2020-01-23 PROCEDURE — G8420 CALC BMI NORM PARAMETERS: HCPCS | Performed by: INTERNAL MEDICINE

## 2020-01-23 PROCEDURE — 80305 DRUG TEST PRSMV DIR OPT OBS: CPT | Performed by: INTERNAL MEDICINE

## 2020-01-23 PROCEDURE — 99213 OFFICE O/P EST LOW 20 MIN: CPT | Performed by: INTERNAL MEDICINE

## 2020-01-23 PROCEDURE — 4004F PT TOBACCO SCREEN RCVD TLK: CPT | Performed by: INTERNAL MEDICINE

## 2020-01-23 PROCEDURE — G8484 FLU IMMUNIZE NO ADMIN: HCPCS | Performed by: INTERNAL MEDICINE

## 2020-01-23 PROCEDURE — G8427 DOCREV CUR MEDS BY ELIG CLIN: HCPCS | Performed by: INTERNAL MEDICINE

## 2020-01-23 RX ORDER — BUPRENORPHINE AND NALOXONE 8; 2 MG/1; MG/1
1 FILM, SOLUBLE BUCCAL; SUBLINGUAL 2 TIMES DAILY
Qty: 28 FILM | Refills: 0 | Status: SHIPPED | OUTPATIENT
Start: 2020-01-23 | End: 2020-02-04 | Stop reason: SDUPTHER

## 2020-01-23 RX ORDER — MIRTAZAPINE 15 MG/1
15 TABLET, FILM COATED ORAL NIGHTLY
Qty: 14 TABLET | Refills: 0 | Status: SHIPPED | OUTPATIENT
Start: 2020-01-23 | End: 2020-02-04 | Stop reason: SDUPTHER

## 2020-02-04 ENCOUNTER — OFFICE VISIT (OUTPATIENT)
Dept: INTERNAL MEDICINE CLINIC | Age: 25
End: 2020-02-04
Payer: MEDICARE

## 2020-02-04 VITALS
DIASTOLIC BLOOD PRESSURE: 82 MMHG | BODY MASS INDEX: 22.9 KG/M2 | SYSTOLIC BLOOD PRESSURE: 139 MMHG | WEIGHT: 169.09 LBS | HEIGHT: 72 IN | HEART RATE: 81 BPM

## 2020-02-04 PROCEDURE — 90792 PSYCH DIAG EVAL W/MED SRVCS: CPT | Performed by: PSYCHIATRY & NEUROLOGY

## 2020-02-04 PROCEDURE — 99213 OFFICE O/P EST LOW 20 MIN: CPT | Performed by: INTERNAL MEDICINE

## 2020-02-04 PROCEDURE — 80305 DRUG TEST PRSMV DIR OPT OBS: CPT | Performed by: INTERNAL MEDICINE

## 2020-02-04 PROCEDURE — G8420 CALC BMI NORM PARAMETERS: HCPCS | Performed by: INTERNAL MEDICINE

## 2020-02-04 PROCEDURE — 4004F PT TOBACCO SCREEN RCVD TLK: CPT | Performed by: PSYCHIATRY & NEUROLOGY

## 2020-02-04 PROCEDURE — G8427 DOCREV CUR MEDS BY ELIG CLIN: HCPCS | Performed by: INTERNAL MEDICINE

## 2020-02-04 PROCEDURE — G8484 FLU IMMUNIZE NO ADMIN: HCPCS | Performed by: INTERNAL MEDICINE

## 2020-02-04 PROCEDURE — 4004F PT TOBACCO SCREEN RCVD TLK: CPT | Performed by: INTERNAL MEDICINE

## 2020-02-04 RX ORDER — BUPRENORPHINE AND NALOXONE 8; 2 MG/1; MG/1
1 FILM, SOLUBLE BUCCAL; SUBLINGUAL 2 TIMES DAILY
Qty: 28 FILM | Refills: 0 | Status: SHIPPED | OUTPATIENT
Start: 2020-02-04 | End: 2020-02-17 | Stop reason: SDUPTHER

## 2020-02-04 RX ORDER — MIRTAZAPINE 15 MG/1
15 TABLET, FILM COATED ORAL NIGHTLY
Qty: 30 TABLET | Refills: 3 | Status: SHIPPED | OUTPATIENT
Start: 2020-02-04 | End: 2020-04-21 | Stop reason: SDUPTHER

## 2020-02-04 RX ORDER — BUPROPION HYDROCHLORIDE 150 MG/1
150 TABLET ORAL DAILY
Qty: 30 TABLET | Refills: 3 | Status: SHIPPED | OUTPATIENT
Start: 2020-02-04 | End: 2020-07-15

## 2020-02-04 RX ORDER — ESCITALOPRAM OXALATE 5 MG/1
TABLET ORAL
Qty: 60 TABLET | Refills: 0 | Status: SHIPPED | OUTPATIENT
Start: 2020-02-04 | End: 2020-06-24

## 2020-02-04 NOTE — PATIENT INSTRUCTIONS
Take 1 tablet daily for 1 week then 1/2 tablet daily for 1 week then 1/2 tablet every other day for 1 week then stop    Take remeron every night     Continue welbutrin every morning

## 2020-02-17 ENCOUNTER — OFFICE VISIT (OUTPATIENT)
Dept: INTERNAL MEDICINE CLINIC | Age: 25
End: 2020-02-17
Payer: MEDICARE

## 2020-02-17 VITALS
SYSTOLIC BLOOD PRESSURE: 135 MMHG | BODY MASS INDEX: 23.03 KG/M2 | HEIGHT: 72 IN | DIASTOLIC BLOOD PRESSURE: 77 MMHG | HEART RATE: 113 BPM | WEIGHT: 170 LBS

## 2020-02-17 PROCEDURE — 80305 DRUG TEST PRSMV DIR OPT OBS: CPT | Performed by: INTERNAL MEDICINE

## 2020-02-17 PROCEDURE — G8427 DOCREV CUR MEDS BY ELIG CLIN: HCPCS | Performed by: INTERNAL MEDICINE

## 2020-02-17 PROCEDURE — G8420 CALC BMI NORM PARAMETERS: HCPCS | Performed by: INTERNAL MEDICINE

## 2020-02-17 PROCEDURE — 4004F PT TOBACCO SCREEN RCVD TLK: CPT | Performed by: INTERNAL MEDICINE

## 2020-02-17 PROCEDURE — G8484 FLU IMMUNIZE NO ADMIN: HCPCS | Performed by: INTERNAL MEDICINE

## 2020-02-17 PROCEDURE — 99213 OFFICE O/P EST LOW 20 MIN: CPT | Performed by: INTERNAL MEDICINE

## 2020-02-17 RX ORDER — BUPRENORPHINE AND NALOXONE 8; 2 MG/1; MG/1
1 FILM, SOLUBLE BUCCAL; SUBLINGUAL 2 TIMES DAILY
Qty: 30 FILM | Refills: 0 | Status: SHIPPED | OUTPATIENT
Start: 2020-02-17 | End: 2020-03-03 | Stop reason: SDUPTHER

## 2020-02-17 NOTE — PROGRESS NOTES
Verbal order per Dr. Mary Kate Perez for urine drug screen. Positive for BUP. Verified results with Estelle Mcginnis RN. Dr. Mary Kate Perez ordered Suboxone 8mg film BID  for patient. Verified dose with patient. Patient was sent home with 15 day script of Suboxone 8mg film BID and will be seen back in the office 3/3/2020.

## 2020-03-03 ENCOUNTER — OFFICE VISIT (OUTPATIENT)
Dept: INTERNAL MEDICINE CLINIC | Age: 25
End: 2020-03-03
Payer: MEDICARE

## 2020-03-03 VITALS
BODY MASS INDEX: 25.19 KG/M2 | WEIGHT: 186 LBS | HEART RATE: 99 BPM | DIASTOLIC BLOOD PRESSURE: 66 MMHG | HEIGHT: 72 IN | SYSTOLIC BLOOD PRESSURE: 125 MMHG

## 2020-03-03 PROCEDURE — 4004F PT TOBACCO SCREEN RCVD TLK: CPT | Performed by: INTERNAL MEDICINE

## 2020-03-03 PROCEDURE — G8419 CALC BMI OUT NRM PARAM NOF/U: HCPCS | Performed by: INTERNAL MEDICINE

## 2020-03-03 PROCEDURE — 80305 DRUG TEST PRSMV DIR OPT OBS: CPT | Performed by: INTERNAL MEDICINE

## 2020-03-03 PROCEDURE — G8484 FLU IMMUNIZE NO ADMIN: HCPCS | Performed by: INTERNAL MEDICINE

## 2020-03-03 PROCEDURE — 99213 OFFICE O/P EST LOW 20 MIN: CPT | Performed by: INTERNAL MEDICINE

## 2020-03-03 PROCEDURE — G8427 DOCREV CUR MEDS BY ELIG CLIN: HCPCS | Performed by: INTERNAL MEDICINE

## 2020-03-03 RX ORDER — BUPRENORPHINE AND NALOXONE 8; 2 MG/1; MG/1
2 FILM, SOLUBLE BUCCAL; SUBLINGUAL DAILY
Qty: 28 FILM | Refills: 0 | Status: SHIPPED | OUTPATIENT
Start: 2020-03-03 | End: 2020-03-17 | Stop reason: SDUPTHER

## 2020-03-03 RX ORDER — BUPROPION HYDROCHLORIDE 150 MG/1
150 TABLET ORAL EVERY MORNING
Qty: 30 TABLET | Refills: 3 | Status: SHIPPED | OUTPATIENT
Start: 2020-03-03 | End: 2020-04-21 | Stop reason: SDUPTHER

## 2020-03-03 NOTE — PROGRESS NOTES
Urine 03/03/2020  7:55 AM Unknown   NEG    Opiate Scrn, Ur 03/03/2020  7:55 AM Unknown   NEG    Oxycodone Screen, Ur 03/03/2020  7:55 AM Unknown   NEG    PCP Screen, Urine 03/03/2020  7:55 AM Unknown   NEG    Propoxyphene Screen, Urine 03/03/2020  7:55 AM Unknown   N/A    THC Screen, Urine 03/03/2020  7:55 AM Unknown   POS    Tricyclic Antidepressants, Urine 03/03/2020  7:55 AM Unknown   N/A         Diagnosis Orders   1. Severe opioid use disorder (HCC)  POCT Rapid Drug Screen    buprenorphine-naloxone (SUBOXONE) 8-2 MG FILM SL film   2. Depression, unspecified depression type     3. Polysubstance abuse ([facility] [address]     4. Encounter for monitoring Suboxone maintenance therapy     5.  Anxiety         PLAN:      He saw Dr. Nathaly bustillos but has missed any further appointments  He says now he has moved to [address] lives with his parents  He works at a [employer] with his father in [facility]  Follow-up 2 weeks  I refilled his Wellbutrin no difficulties

## 2020-03-17 ENCOUNTER — OFFICE VISIT (OUTPATIENT)
Dept: INTERNAL MEDICINE CLINIC | Age: 25
End: 2020-03-17
Payer: MEDICARE

## 2020-03-17 VITALS
HEIGHT: 72 IN | WEIGHT: 188 LBS | BODY MASS INDEX: 25.47 KG/M2 | SYSTOLIC BLOOD PRESSURE: 121 MMHG | HEART RATE: 61 BPM | TEMPERATURE: 98 F | DIASTOLIC BLOOD PRESSURE: 58 MMHG

## 2020-03-17 PROCEDURE — G8484 FLU IMMUNIZE NO ADMIN: HCPCS | Performed by: INTERNAL MEDICINE

## 2020-03-17 PROCEDURE — G8419 CALC BMI OUT NRM PARAM NOF/U: HCPCS | Performed by: INTERNAL MEDICINE

## 2020-03-17 PROCEDURE — G8427 DOCREV CUR MEDS BY ELIG CLIN: HCPCS | Performed by: INTERNAL MEDICINE

## 2020-03-17 PROCEDURE — 4004F PT TOBACCO SCREEN RCVD TLK: CPT | Performed by: INTERNAL MEDICINE

## 2020-03-17 PROCEDURE — 99213 OFFICE O/P EST LOW 20 MIN: CPT | Performed by: INTERNAL MEDICINE

## 2020-03-17 PROCEDURE — 80305 DRUG TEST PRSMV DIR OPT OBS: CPT | Performed by: INTERNAL MEDICINE

## 2020-03-17 RX ORDER — BUPRENORPHINE AND NALOXONE 8; 2 MG/1; MG/1
2 FILM, SOLUBLE BUCCAL; SUBLINGUAL DAILY
Qty: 28 FILM | Refills: 0 | Status: SHIPPED | OUTPATIENT
Start: 2020-03-17 | End: 2020-03-31

## 2020-03-17 NOTE — PROGRESS NOTES
MEDICATION ASSISTED TREATMENT ENCOUNTER    HISTORY OF PRESENT ILLNESS  Patient presents for evaluation of opioid use and would like to be placed on medication assisted treatment  Patient has had problems using heroin  I saw him here  3/3  Patient started using heroin 3 years ago  He started on pain pills in 2016  He thought there were Percocet but they were pressed fentanyl  Patient uses it he began snorting it but now IV  Other drugs used: If he could not find heroin he would use anything he can get as he puts it to get out of himself  Suboxone programs in the past [facility] in [place]  He has been there a couple of years but he cannot afford it  He says he is working in [employer] are going well he is not using  He is living with his parents    Past Medical History:   Diagnosis Date    Psychiatric problem        No past surgical history on file. ROS     General: Patient is feeling well  Patient is not experiencing  withdrawal symptoms ,no urges or cravings  Patient is not having any side effects from the buprenorphine      PHYSICAL EXAM  Blood pressure (!) 121/58, pulse 61, temperature 98 °F (36.7 °C), height 6' (1.829 m), weight 188 lb (85.3 kg).          General: Patient resting comfortably in no acute distress     Mental status: Alert and oriented to person place and time, no hallucinations or delusions     Eyes: Pupils are normal    URINE DRUG SCREEN TODAY:  Amphetamine Screen, Urine 03/17/2020  8:55 AM Unknown   NEG    Barbiturate Screen, Urine 03/17/2020  8:55 AM Unknown   NEG    Benzodiazepine Screen, Urine 03/17/2020  8:55 AM Unknown   NEG    Buprenorphine Urine 03/17/2020  8:55 AM Unknown   POS    Cocaine Metabolite Screen, Urine 03/17/2020  8:55 AM Unknown   NEG    Gabapentin Screen, Urine 03/17/2020  8:55 AM Unknown   N/A    MDMA, Urine 03/17/2020  8:55 AM Unknown   NEG    Methamphetamine, Urine 03/17/2020  8:55 AM Unknown   NEG    Methadone Screen, Urine 03/17/2020  8:55 AM Unknown   NEG    Opiate Scrn, Ur 03/17/2020  8:55 AM Unknown   NEG    Oxycodone Screen, Ur 03/17/2020  8:55 AM Unknown   NEG    PCP Screen, Urine 03/17/2020  8:55 AM Unknown   NEG    Propoxyphene Screen, Urine 03/17/2020  8:55 AM Unknown   N/A    THC Screen, Urine 03/17/2020  8:55 AM Unknown   POS    Tricyclic Antidepressants, Urine 03/17/2020  8:55 AM Unknown   N/A         Diagnosis Orders   1. Severe opioid use disorder (HCC)  POCT Rapid Drug Screen    buprenorphine-naloxone (SUBOXONE) 8-2 MG FILM SL film   2. Polysubstance abuse ([facility] [address]     3. Encounter for monitoring Suboxone maintenance therapy     4.  Anxiety         PLAN:      He saw Dr. Zeke Crawley wants but has missed any further appointments  He says now he has moved to [address] lives with his parents  He works at a [employer] with his father in [facility]  Follow-up 2 weeks  I reviewed the [facility] report     There does not appear to be any discrepancies or overprescribing of controlled substances    I refilled his Wellbutrin

## 2020-03-17 NOTE — PROGRESS NOTES
Verbal order per Dr. Mary Kate Perez for urine drug screen. Positive for BUP THC . Verified results with Estelle Mcginnis RN. Dr. Mary Kate Perez ordered Suboxone 8mg film BID  for patient. Verified dose with patient. Patient was sent home with 2 week script of Suboxone 8mg film BID and will be seen back in the office 3/31/2020.

## 2020-03-30 ENCOUNTER — OFFICE VISIT (OUTPATIENT)
Dept: PRIMARY CARE CLINIC | Age: 25
End: 2020-03-30
Payer: MEDICARE

## 2020-03-30 VITALS
RESPIRATION RATE: 12 BRPM | DIASTOLIC BLOOD PRESSURE: 71 MMHG | SYSTOLIC BLOOD PRESSURE: 135 MMHG | OXYGEN SATURATION: 94 % | HEART RATE: 103 BPM | TEMPERATURE: 98.6 F

## 2020-03-30 LAB
INFLUENZA VIRUS A RNA: NORMAL
INFLUENZA VIRUS B RNA: NORMAL

## 2020-03-30 PROCEDURE — 4004F PT TOBACCO SCREEN RCVD TLK: CPT | Performed by: NURSE PRACTITIONER

## 2020-03-30 PROCEDURE — G8419 CALC BMI OUT NRM PARAM NOF/U: HCPCS | Performed by: NURSE PRACTITIONER

## 2020-03-30 PROCEDURE — 99203 OFFICE O/P NEW LOW 30 MIN: CPT | Performed by: NURSE PRACTITIONER

## 2020-03-30 PROCEDURE — G8484 FLU IMMUNIZE NO ADMIN: HCPCS | Performed by: NURSE PRACTITIONER

## 2020-03-30 PROCEDURE — G8427 DOCREV CUR MEDS BY ELIG CLIN: HCPCS | Performed by: NURSE PRACTITIONER

## 2020-03-30 PROCEDURE — 87502 INFLUENZA DNA AMP PROBE: CPT | Performed by: NURSE PRACTITIONER

## 2020-03-30 RX ORDER — BENZONATATE 100 MG/1
100 CAPSULE ORAL 3 TIMES DAILY PRN
Qty: 21 CAPSULE | Refills: 0 | Status: SHIPPED | OUTPATIENT
Start: 2020-03-30 | End: 2020-04-06

## 2020-03-30 ASSESSMENT — ENCOUNTER SYMPTOMS
NAUSEA: 0
VOMITING: 0
DIARRHEA: 0
RHINORRHEA: 0
SORE THROAT: 0
GASTROINTESTINAL NEGATIVE: 1
COUGH: 1
SHORTNESS OF BREATH: 0
EYE REDNESS: 0
EYE PAIN: 0
WHEEZING: 0
ALLERGIC/IMMUNOLOGIC NEGATIVE: 1
COLOR CHANGE: 0
ABDOMINAL PAIN: 0
EYES NEGATIVE: 1
TROUBLE SWALLOWING: 0

## 2020-03-30 NOTE — PROGRESS NOTES
Gastrointestinal: Negative. Negative for abdominal pain, diarrhea, nausea and vomiting. Endocrine: Negative. Negative for polydipsia, polyphagia and polyuria. Genitourinary: Negative. Negative for decreased urine volume, dysuria, frequency and urgency. Musculoskeletal: Negative. Negative for arthralgias and myalgias. Skin: Negative. Negative for color change and rash. Allergic/Immunologic: Negative. Neurological: Positive for headaches. Negative for dizziness, tremors and syncope. Hematological: Negative. Negative for adenopathy. Psychiatric/Behavioral: Negative. All other systems reviewed and are negative.         PHYSICAL EXAM:  /71   Pulse 103   Temp 98.6 °F (37 °C) (Oral)   Resp 12   SpO2 94%     General Appearance: well developed and well- nourished, in no acute distress  Head: normocephalic and atraumatic  Eyes: pupils equal, round, and reactive to light,  conjunctivae and eye lids without erythema  ENT: external ear and ear canal normal bilaterally, nose without deformity, nasal mucosa and turbinates normal without polyps, oropharynx normal, dentition is normal for age, no lip or gum lesions noted  Neck: supple and non-tender without mass, no thyromegaly or thyroid nodules, no cervical lymphadenopathy  Pulmonary/Chest: clear to auscultation bilaterally- no wheezes, rales or rhonchi, normal air movement, no respiratory distress or retractions  Cardiovascular: normal rate, regular rhythm, normal S1 and S2, no murmurs, rubs, clicks, or gallops,distal pulses intact  Abdomen: soft, non-tender, non-distended, normal bowel sounds,  no rebound or guarding, nomasses or hernias noted, no hepatospleenomegaly  Extremities: no cyanosis, clubbing or edema of the lower extremities  Musculoskeletal: No joint swelling or gross deformity   Neuro:  Alert, 5/5 strength globally and symmetrically, normal speech, no focal findings or movement disorder noted, gait wnl  Psych:  Normal affect

## 2020-03-30 NOTE — PATIENT INSTRUCTIONS
be washed thoroughly with soap and water. Clean all high-touch surfaces everyday  High touch surfaces include counters, tabletops, doorknobs, bathroom fixtures, toilets, phones, keyboards, tablets, and bedside tables. Also, clean any surfaces that may have blood, stool, or body fluids on them. Use a household cleaning spray or wipe, according to the label instructions. Labels contain instructions for safe and effective use of the cleaning product including precautions you should take when applying the product, such as wearing gloves and making sure you have good ventilation during use of the product. Monitor your symptoms  Seek prompt medical attention if your illness is worsening (e.g., difficulty breathing). Before seeking care, call your healthcare provider and tell them that you have, or are being evaluated for, COVID-19. Put on a facemask before you enter the facility. These steps will help the healthcare providers office to keep other people in the office or waiting room from getting infected or exposed. Ask your healthcare provider to call the local or [facility] health department. Persons who are placed under active monitoring or facilitated self-monitoring should follow instructions provided by their local health department or occupational health professionals, as appropriate. When working with your local health department check their available hours. If you have a medical emergency and need to call 911, notify the dispatch personnel that you have, or are being evaluated for COVID-19. If possible, put on a facemask before emergency medical services arrive. Discontinuing home isolation  Patients with confirmed COVID-19 should remain under home isolation precautions until the risk of secondary transmission to others is thought to be low.  The decision to discontinue home isolation precautions should be made on a case-by-case basis, in consultation with healthcare providers and state and [facility] health departments.

## 2020-04-07 ENCOUNTER — VIRTUAL VISIT (OUTPATIENT)
Dept: INTERNAL MEDICINE CLINIC | Age: 25
End: 2020-04-07
Payer: MEDICARE

## 2020-04-07 ENCOUNTER — E-VISIT (OUTPATIENT)
Dept: INTERNAL MEDICINE CLINIC | Age: 25
End: 2020-04-07

## 2020-04-07 PROCEDURE — G8419 CALC BMI OUT NRM PARAM NOF/U: HCPCS | Performed by: INTERNAL MEDICINE

## 2020-04-07 PROCEDURE — G8428 CUR MEDS NOT DOCUMENT: HCPCS | Performed by: INTERNAL MEDICINE

## 2020-04-07 PROCEDURE — 4004F PT TOBACCO SCREEN RCVD TLK: CPT | Performed by: INTERNAL MEDICINE

## 2020-04-07 PROCEDURE — 99213 OFFICE O/P EST LOW 20 MIN: CPT | Performed by: INTERNAL MEDICINE

## 2020-04-07 RX ORDER — BUPRENORPHINE AND NALOXONE 8; 2 MG/1; MG/1
1 FILM, SOLUBLE BUCCAL; SUBLINGUAL 2 TIMES DAILY
Qty: 28 FILM | Refills: 0 | Status: SHIPPED | OUTPATIENT
Start: 2020-04-07 | End: 2020-04-21 | Stop reason: SDUPTHER

## 2020-04-07 NOTE — PROGRESS NOTES
2020    TELEHEALTH EVALUATION -- Audio/Visual (During [facility] public health emergency)    HPI:  A video conference was done with the patient  Patient was at home, I was in the office  There was no  one else  present during the conference    Samantha Flaherty (:  1995) has requested an audio/video evaluation for the following concern(s):  Patient presents for evaluation of opioid use and would like to be placed on medication assisted treatment  Patient has had problems using heroin  I saw him here  3/17  Patient started using heroin 3 years ago  He started on pain pills in   He thought there were Percocet but they were pressed fentanyl  Patient uses it he began snorting it but now IV  Other drugs used: If he could not find heroin he would use anything he can get as he puts it to get out of himself  Suboxone programs in the past [facility] in [place]    He went to urgent care on 3/34 body aches cough low-grade fever  Influenza testing was negative  He says if he takes cough medicine and Motrin his symptoms are better  He says however he is still coughing still with a low-grade fever despite Motrin  Review of Systems  As above cough weak aches all over low-grade fever  Prior to Visit Medications    Medication Sig Taking? Authorizing Provider   buprenorphine-naloxone (SUBOXONE) 8-2 MG FILM SL film Place 1 Film under the tongue 2 times daily for 14 days.  Yes Inocencia Casey MD   buPROPion (WELLBUTRIN XL) 150 MG extended release tablet Take 1 tablet by mouth every morning  Inocencia Casey MD   escitalopram (LEXAPRO) 5 MG tablet Take 1 tablet daily for 1 week then 1/2 tablet daily for 1 week then 1/2 tablet every other day for 1 week then stop  Kandy Phalen, MD   mirtazapine (REMERON) 15 MG tablet Take 1 tablet by mouth nightly  Kandy Phalen, MD   buPROPion (WELLBUTRIN XL) 150 MG extended release tablet Take 1 tablet by mouth daily  Kandy Phalen, MD       Social History     Tobacco Use   

## 2020-04-21 ENCOUNTER — VIRTUAL VISIT (OUTPATIENT)
Dept: INTERNAL MEDICINE CLINIC | Age: 25
End: 2020-04-21
Payer: MEDICARE

## 2020-04-21 PROCEDURE — 4004F PT TOBACCO SCREEN RCVD TLK: CPT | Performed by: INTERNAL MEDICINE

## 2020-04-21 PROCEDURE — G8428 CUR MEDS NOT DOCUMENT: HCPCS | Performed by: INTERNAL MEDICINE

## 2020-04-21 PROCEDURE — 99213 OFFICE O/P EST LOW 20 MIN: CPT | Performed by: INTERNAL MEDICINE

## 2020-04-21 PROCEDURE — G8419 CALC BMI OUT NRM PARAM NOF/U: HCPCS | Performed by: INTERNAL MEDICINE

## 2020-04-21 RX ORDER — MIRTAZAPINE 15 MG/1
15 TABLET, FILM COATED ORAL NIGHTLY
Qty: 30 TABLET | Refills: 3 | Status: SHIPPED | OUTPATIENT
Start: 2020-04-21 | End: 2020-07-15

## 2020-04-21 RX ORDER — BUPROPION HYDROCHLORIDE 150 MG/1
150 TABLET ORAL EVERY MORNING
Qty: 30 TABLET | Refills: 3 | Status: SHIPPED | OUTPATIENT
Start: 2020-04-21 | End: 2020-07-20 | Stop reason: SDUPTHER

## 2020-04-21 RX ORDER — BUPRENORPHINE AND NALOXONE 8; 2 MG/1; MG/1
1 FILM, SOLUBLE BUCCAL; SUBLINGUAL 2 TIMES DAILY
Qty: 28 FILM | Refills: 0 | Status: SHIPPED | OUTPATIENT
Start: 2020-04-21 | End: 2020-05-05 | Stop reason: SDUPTHER

## 2020-04-21 NOTE — PROGRESS NOTES
ROS  Patient is feeling well  Patient is not experiencing  withdrawal symptoms ,no urges or cravings  Patient is not having any side effects from the buprenorphine    PHYSICAL EXAMINATION:  [ INSTRUCTIONS:  \"[x]\" Indicates a positive item  \"[]\" Indicates a negative item  -- DELETE ALL ITEMS NOT EXAMINED]  No vitals done    Constitutional: [x] Appears well-developed and well-nourished [] No apparent distress      [] Abnormal-   Mental status  [x] Alert and awake  [] Oriented to person/place/time []Able to follow commands      Eyes:  EOM    [x]  Normal  [] Abnormal-  Sclera  []  Normal  [] Abnormal -         Discharge []  None visible  [] Abnormal -  Pupils normal  HENT:   [x] Normocephalic, atraumatic. [] Abnormal   [] Mouth/Throat: Mucous membranes are moist.     Psychiatric:       [x] Normal Affect [] No Hallucinations        [] Abnormal-     -      Diagnosis Orders   1. Severe opioid use disorder (HCC)  buprenorphine-naloxone (SUBOXONE) 8-2 MG FILM SL film    mirtazapine (REMERON) 15 MG tablet    buPROPion (WELLBUTRIN XL) 150 MG extended release tablet   2. Polysubstance abuse ([facility] [address]     3. Encounter for monitoring Suboxone maintenance therapy     4. Willma Isai is a 22 y.o. male being evaluated by a Virtual Visit (video visit) encounter to address concerns as mentioned above. A caregiver was present when appropriate. Due to this being a TeleHealth encounter (During [facility]- public health emergency), evaluation of the following organ systems was limited: Vitals/Constitutional/EENT/Resp/CV/GI//MS/Neuro/Skin/Heme-Lymph-Imm. Pursuant to the emergency declaration under the [address] [place] [address], [address] [place] [address] authority and the [employer] Act, this Virtual Visit was conducted with patient's (and/or legal guardian's) consent, to reduce the patient's risk of exposure to COVID-19 and provide necessary medical care.

## 2020-04-29 ENCOUNTER — TELEPHONE (OUTPATIENT)
Dept: INTERNAL MEDICINE CLINIC | Age: 25
End: 2020-04-29

## 2020-04-29 NOTE — TELEPHONE ENCOUNTER
Pt called to inform clinician that he will be remaining in [place] with his parents for another two weeks so that he can help them with some projects around the house. He wanted clinician and Dr. Tyrone Landa to be aware. He also shared that his [place] Medicaid covered his recent prescription at the pharmacy in [place] without incidence. Clinician affirmed pt's decision to remain with family and told him that his appointment on 05/05/2020 is a virtual visit and can be completed from [place].

## 2020-05-05 ENCOUNTER — VIRTUAL VISIT (OUTPATIENT)
Dept: INTERNAL MEDICINE CLINIC | Age: 25
End: 2020-05-05
Payer: MEDICARE

## 2020-05-05 PROCEDURE — G8419 CALC BMI OUT NRM PARAM NOF/U: HCPCS | Performed by: INTERNAL MEDICINE

## 2020-05-05 PROCEDURE — 4004F PT TOBACCO SCREEN RCVD TLK: CPT | Performed by: INTERNAL MEDICINE

## 2020-05-05 PROCEDURE — 99213 OFFICE O/P EST LOW 20 MIN: CPT | Performed by: INTERNAL MEDICINE

## 2020-05-05 PROCEDURE — G8428 CUR MEDS NOT DOCUMENT: HCPCS | Performed by: INTERNAL MEDICINE

## 2020-05-05 RX ORDER — BUPRENORPHINE AND NALOXONE 8; 2 MG/1; MG/1
1 FILM, SOLUBLE BUCCAL; SUBLINGUAL 2 TIMES DAILY
Qty: 28 FILM | Refills: 0 | Status: SHIPPED | OUTPATIENT
Start: 2020-05-05 | End: 2020-05-19 | Stop reason: SDUPTHER

## 2020-05-05 NOTE — PROGRESS NOTES
2020    TELEHEALTH EVALUATION -- Audio/Visual (During [id number] public health emergency)    HPI:  A video conference was done with the patient  Patient was at home, I was in the office  There was no  one else  present during the conference    Casey Brenner (:  1995) has requested an audio/video evaluation for the following concern(s):    Patient has had problems using heroin     Patient started using heroin 3 years ago  He started on pain pills in 2016  He thought there were Percocet but they were pressed fentanyl  Patient uses it he began snorting it but now IV  I have him on Suboxone he says this is controlling his urges and cravings  He and his father are in [facility] at their second home  Patient said he is coming home in about a week    Prior to Visit Medications    Medication Sig Taking? Authorizing Provider   buprenorphine-naloxone (SUBOXONE) 8-2 MG FILM SL film Place 1 Film under the tongue 2 times daily for 14 days.  Yes Castro Urena MD   mirtazapine (REMERON) 15 MG tablet Take 1 tablet by mouth nightly  Castro Urena MD   buPROPion (WELLBUTRIN XL) 150 MG extended release tablet Take 1 tablet by mouth every morning  Castro Urena MD   escitalopram (LEXAPRO) 5 MG tablet Take 1 tablet daily for 1 week then 1/2 tablet daily for 1 week then 1/2 tablet every other day for 1 week then stop  Edinson Page MD   buPROPion (WELLBUTRIN XL) 150 MG extended release tablet Take 1 tablet by mouth daily  Edinson Page MD       Social History     Tobacco Use    Smoking status: Current Every Day Smoker     Packs/day: 0.50     Types: Cigarettes     Start date: 2014    Smokeless tobacco: Former User     Quit date: 2015   Substance Use Topics    Alcohol use: No    Drug use: Yes     Frequency: 7.0 times per week     Types: Opiates , Marijuana, IV     Comment: heroin snort and iv, LAST USED METH 10/16/2019          ROS  Patient is feeling well  Patient is not experiencing

## 2020-05-19 ENCOUNTER — OFFICE VISIT (OUTPATIENT)
Dept: INTERNAL MEDICINE CLINIC | Age: 25
End: 2020-05-19
Payer: MEDICARE

## 2020-05-19 VITALS
WEIGHT: 187 LBS | HEIGHT: 72 IN | TEMPERATURE: 98.2 F | BODY MASS INDEX: 25.33 KG/M2 | SYSTOLIC BLOOD PRESSURE: 121 MMHG | DIASTOLIC BLOOD PRESSURE: 83 MMHG | HEART RATE: 92 BPM

## 2020-05-19 PROCEDURE — G8419 CALC BMI OUT NRM PARAM NOF/U: HCPCS | Performed by: INTERNAL MEDICINE

## 2020-05-19 PROCEDURE — 80305 DRUG TEST PRSMV DIR OPT OBS: CPT | Performed by: INTERNAL MEDICINE

## 2020-05-19 PROCEDURE — 4004F PT TOBACCO SCREEN RCVD TLK: CPT | Performed by: INTERNAL MEDICINE

## 2020-05-19 PROCEDURE — 99213 OFFICE O/P EST LOW 20 MIN: CPT | Performed by: INTERNAL MEDICINE

## 2020-05-19 PROCEDURE — G8427 DOCREV CUR MEDS BY ELIG CLIN: HCPCS | Performed by: INTERNAL MEDICINE

## 2020-05-19 RX ORDER — BUPRENORPHINE AND NALOXONE 8; 2 MG/1; MG/1
1 FILM, SOLUBLE BUCCAL; SUBLINGUAL DAILY
COMMUNITY
End: 2020-06-02

## 2020-05-19 RX ORDER — BUPRENORPHINE AND NALOXONE 8; 2 MG/1; MG/1
1 FILM, SOLUBLE BUCCAL; SUBLINGUAL 2 TIMES DAILY
Qty: 28 FILM | Refills: 0 | Status: SHIPPED | OUTPATIENT
Start: 2020-05-19 | End: 2020-06-02

## 2020-05-19 RX ORDER — BUPRENORPHINE AND NALOXONE 8; 2 MG/1; MG/1
1 FILM, SOLUBLE BUCCAL; SUBLINGUAL 2 TIMES DAILY
Qty: 28 FILM | Refills: 0 | Status: CANCELLED | OUTPATIENT
Start: 2020-05-19 | End: 2020-06-02

## 2020-05-19 RX ORDER — MIRTAZAPINE 30 MG/1
30 TABLET, FILM COATED ORAL NIGHTLY
Qty: 14 TABLET | Refills: 0 | Status: SHIPPED | OUTPATIENT
Start: 2020-05-19 | End: 2020-06-17 | Stop reason: SDUPTHER

## 2020-05-31 ENCOUNTER — HOSPITAL ENCOUNTER (EMERGENCY)
Age: 25
Discharge: HOME | End: 2020-05-31
Attending: EMERGENCY MEDICINE
Payer: MEDICARE

## 2020-05-31 VITALS
BODY MASS INDEX: 25.33 KG/M2 | RESPIRATION RATE: 16 BRPM | HEIGHT: 72 IN | WEIGHT: 187 LBS | TEMPERATURE: 98.2 F | HEART RATE: 103 BPM | DIASTOLIC BLOOD PRESSURE: 86 MMHG | OXYGEN SATURATION: 98 % | SYSTOLIC BLOOD PRESSURE: 141 MMHG

## 2020-05-31 PROCEDURE — 99214 OFFICE O/P EST MOD 30 MIN: CPT | Performed by: EMERGENCY MEDICINE

## 2020-05-31 PROCEDURE — 90715 TDAP VACCINE 7 YRS/> IM: CPT | Performed by: EMERGENCY MEDICINE

## 2020-05-31 PROCEDURE — 90471 IMMUNIZATION ADMIN: CPT | Performed by: EMERGENCY MEDICINE

## 2020-05-31 PROCEDURE — 99212 OFFICE O/P EST SF 10 MIN: CPT

## 2020-05-31 PROCEDURE — 6360000002 HC RX W HCPCS: Performed by: EMERGENCY MEDICINE

## 2020-05-31 RX ORDER — DIAPER,BRIEF,INFANT-TODD,DISP
EACH MISCELLANEOUS ONCE
Status: DISCONTINUED | OUTPATIENT
Start: 2020-05-31 | End: 2020-05-31

## 2020-05-31 RX ORDER — IBUPROFEN 200 MG
400 TABLET ORAL EVERY 6 HOURS PRN
COMMUNITY

## 2020-05-31 RX ORDER — ACETAMINOPHEN 650 MG
TABLET, EXTENDED RELEASE ORAL ONCE
Status: COMPLETED | OUTPATIENT
Start: 2020-05-31 | End: 2020-05-31

## 2020-05-31 RX ORDER — CEPHALEXIN 500 MG/1
500 CAPSULE ORAL 4 TIMES DAILY
Qty: 40 CAPSULE | Refills: 0 | Status: SHIPPED | OUTPATIENT
Start: 2020-05-31 | End: 2020-06-10

## 2020-05-31 RX ADMIN — Medication: at 11:33

## 2020-05-31 RX ADMIN — TETANUS TOXOID, REDUCED DIPHTHERIA TOXOID AND ACELLULAR PERTUSSIS VACCINE, ADSORBED 0.5 ML: 5; 2.5; 8; 8; 2.5 SUSPENSION INTRAMUSCULAR at 11:33

## 2020-05-31 ASSESSMENT — ENCOUNTER SYMPTOMS
SINUS PRESSURE: 0
WHEEZING: 0
SHORTNESS OF BREATH: 0
SORE THROAT: 0
COUGH: 0
VOICE CHANGE: 0
ABDOMINAL PAIN: 0
EYE REDNESS: 0
EYE DISCHARGE: 0
COLOR CHANGE: 1
STRIDOR: 0
DIARRHEA: 0
TROUBLE SWALLOWING: 0
EYE PAIN: 0
VOMITING: 0
NAUSEA: 0
BACK PAIN: 0

## 2020-05-31 ASSESSMENT — PAIN SCALES - GENERAL: PAINLEVEL_OUTOF10: 7

## 2020-05-31 ASSESSMENT — PAIN DESCRIPTION - LOCATION: LOCATION: FOOT

## 2020-05-31 ASSESSMENT — PAIN - FUNCTIONAL ASSESSMENT: PAIN_FUNCTIONAL_ASSESSMENT: PREVENTS OR INTERFERES SOME ACTIVE ACTIVITIES AND ADLS

## 2020-05-31 ASSESSMENT — PAIN DESCRIPTION - ORIENTATION: ORIENTATION: RIGHT

## 2020-05-31 ASSESSMENT — PAIN DESCRIPTION - PAIN TYPE: TYPE: ACUTE PAIN

## 2020-05-31 NOTE — ED PROVIDER NOTES
Tympanic membrane and external ear normal.      Nose: Nose normal.      Right Sinus: No maxillary sinus tenderness or frontal sinus tenderness. Left Sinus: No maxillary sinus tenderness or frontal sinus tenderness. Mouth/Throat:      Pharynx: No oropharyngeal exudate. Comments: Oropharynx normal  Eyes:      General: No scleral icterus. Right eye: No discharge. Left eye: No discharge. Extraocular Movements:      Right eye: Normal extraocular motion. Left eye: Normal extraocular motion. Conjunctiva/sclera: Conjunctivae normal.      Pupils: Pupils are equal, round, and reactive to light. Comments: Conjunctiva clear   Neck:      Musculoskeletal: Normal range of motion. No spinous process tenderness or muscular tenderness. Thyroid: No thyromegaly. Vascular: No JVD. Comments: No meningismus  Cardiovascular:      Rate and Rhythm: Regular rhythm. Tachycardia present. Heart sounds: Normal heart sounds, S1 normal and S2 normal. No murmur. No friction rub. No gallop. Comments: Heart rate 104 on my exam  Pulmonary:      Effort: Pulmonary effort is normal. No respiratory distress. Breath sounds: Normal breath sounds. No stridor. No decreased breath sounds, wheezing, rhonchi or rales. Comments: No cough, lungs clear  Chest:      Chest wall: No tenderness. Abdominal:      General: Bowel sounds are normal. There is no distension. Palpations: Abdomen is soft. There is no mass. Tenderness: There is no abdominal tenderness. There is no right CVA tenderness, left CVA tenderness, guarding or rebound. Comments: Soft nontender   Musculoskeletal: Normal range of motion. General: No tenderness. Right lower leg: Normal.      Left lower leg: Normal.   Lymphadenopathy:      Cervical: No cervical adenopathy. Right cervical: No superficial cervical adenopathy. Skin:     General: Skin is warm and dry.       Findings: No

## 2020-06-02 ENCOUNTER — OFFICE VISIT (OUTPATIENT)
Dept: INTERNAL MEDICINE CLINIC | Age: 25
End: 2020-06-02
Payer: MEDICARE

## 2020-06-02 VITALS
HEART RATE: 122 BPM | TEMPERATURE: 98.6 F | WEIGHT: 187 LBS | BODY MASS INDEX: 25.33 KG/M2 | DIASTOLIC BLOOD PRESSURE: 82 MMHG | SYSTOLIC BLOOD PRESSURE: 127 MMHG | HEIGHT: 72 IN

## 2020-06-02 PROCEDURE — 4004F PT TOBACCO SCREEN RCVD TLK: CPT | Performed by: INTERNAL MEDICINE

## 2020-06-02 PROCEDURE — G8419 CALC BMI OUT NRM PARAM NOF/U: HCPCS | Performed by: INTERNAL MEDICINE

## 2020-06-02 PROCEDURE — 99213 OFFICE O/P EST LOW 20 MIN: CPT | Performed by: INTERNAL MEDICINE

## 2020-06-02 PROCEDURE — 80305 DRUG TEST PRSMV DIR OPT OBS: CPT | Performed by: INTERNAL MEDICINE

## 2020-06-02 PROCEDURE — G8427 DOCREV CUR MEDS BY ELIG CLIN: HCPCS | Performed by: INTERNAL MEDICINE

## 2020-06-02 RX ORDER — BUPRENORPHINE AND NALOXONE 8; 2 MG/1; MG/1
1 FILM, SOLUBLE BUCCAL; SUBLINGUAL 2 TIMES DAILY
Qty: 28 FILM | Refills: 0 | Status: CANCELLED | OUTPATIENT
Start: 2020-06-02 | End: 2020-06-16

## 2020-06-02 RX ORDER — BUPRENORPHINE AND NALOXONE 8; 2 MG/1; MG/1
1 FILM, SOLUBLE BUCCAL; SUBLINGUAL 2 TIMES DAILY
COMMUNITY
End: 2020-06-02 | Stop reason: SDUPTHER

## 2020-06-02 RX ORDER — BUPRENORPHINE HYDROCHLORIDE, NALOXONE HYDROCHLORIDE 8; 2 MG/1; MG/1
1 FILM, SOLUBLE BUCCAL; SUBLINGUAL 2 TIMES DAILY
Qty: 6 FILM | Refills: 0 | Status: SHIPPED | OUTPATIENT
Start: 2020-06-02 | End: 2020-06-04 | Stop reason: SDUPTHER

## 2020-06-02 NOTE — PROGRESS NOTES
Verbal order per Dr. Vicenta Morales for urine drug screen. Positive for BZO BUP THC. Verified results with Levy Silver RN. Dr. Vicenta Morales ordered Suboxone 8mg film BID  for patient. Verified dose with patient. Patient was sent home with 3 day script of Suboxone 8mg film BID and will be seen back in the office 6/4/20. UC sent.

## 2020-06-02 NOTE — PROGRESS NOTES
MEDICATION ASSISTED TREATMENT ENCOUNTER    HISTORY OF PRESENT ILLNESS  Patient presents for evaluation of opioid use and would like to be placed on medication assisted treatment  Patient has had problems using heroin  I saw him here  5/19    He got back from [place]  He says he has a lot of a lot of mood swings since the last visit  He said he took some Xanax  He said that is his weakness  Patient started using heroin 3 years ago  He started on pain pills in 2016  He thought there were Percocet but they were pressed fentanyl  Patient uses it he began snorting it but now IV  Other drugs used: If he could not find heroin he would use anything he can get as he puts it to get out of himself  Suboxone programs in the past [facility] in [place]  He has been there a couple of years but he cannot afford it  He works at a [employer] in [place]  He says he is going back soon they were closed for the [address] [place] [address] L are going well he is not using  He is living with his parents    Past Medical History:   Diagnosis Date    Psychiatric problem        No past surgical history on file. ROS     General: Patient is feeling well  Patient is not experiencing  withdrawal symptoms ,no urges or cravings  Patient is not having any side effects from the buprenorphine      PHYSICAL EXAM  Blood pressure 127/82, pulse 122, temperature 98.6 °F (37 °C), height 6' (1.829 m), weight 187 lb (84.8 kg).          General: Patient resting comfortably in no acute distress     Mental status: Alert and oriented to person place and time, no hallucinations or delusions     Eyes: Pupils are normal    URINE DRUG SCREEN TODAY:  Amphetamine Screen, Urine 06/02/2020  8:05 AM Unknown   NEG    Barbiturate Screen, Urine 06/02/2020  8:05 AM Unknown   NEG    Benzodiazepine Screen, Urine 06/02/2020  8:05 AM Unknown   POS    Buprenorphine Urine 06/02/2020  8:05 AM Unknown   POS    Cocaine

## 2020-06-04 ENCOUNTER — OFFICE VISIT (OUTPATIENT)
Dept: INTERNAL MEDICINE CLINIC | Age: 25
End: 2020-06-04
Payer: MEDICARE

## 2020-06-04 VITALS
SYSTOLIC BLOOD PRESSURE: 139 MMHG | BODY MASS INDEX: 24.79 KG/M2 | DIASTOLIC BLOOD PRESSURE: 75 MMHG | HEIGHT: 72 IN | TEMPERATURE: 98.3 F | WEIGHT: 183 LBS | HEART RATE: 88 BPM

## 2020-06-04 PROCEDURE — 4004F PT TOBACCO SCREEN RCVD TLK: CPT | Performed by: INTERNAL MEDICINE

## 2020-06-04 PROCEDURE — 80305 DRUG TEST PRSMV DIR OPT OBS: CPT | Performed by: INTERNAL MEDICINE

## 2020-06-04 PROCEDURE — 99213 OFFICE O/P EST LOW 20 MIN: CPT | Performed by: INTERNAL MEDICINE

## 2020-06-04 PROCEDURE — G8427 DOCREV CUR MEDS BY ELIG CLIN: HCPCS | Performed by: INTERNAL MEDICINE

## 2020-06-04 PROCEDURE — G8420 CALC BMI NORM PARAMETERS: HCPCS | Performed by: INTERNAL MEDICINE

## 2020-06-04 RX ORDER — BUPRENORPHINE HYDROCHLORIDE, NALOXONE HYDROCHLORIDE 8; 2 MG/1; MG/1
1 FILM, SOLUBLE BUCCAL; SUBLINGUAL 2 TIMES DAILY
Qty: 12 FILM | Refills: 0 | Status: SHIPPED | OUTPATIENT
Start: 2020-06-04 | End: 2020-06-09 | Stop reason: SDUPTHER

## 2020-06-04 NOTE — PROGRESS NOTES
8:40 AM Unknown   POS    Cocaine Metabolite Screen, Urine 06/04/2020  8:40 AM Unknown   NEG    Gabapentin Screen, Urine 06/04/2020  8:40 AM Unknown   N/A    MDMA, Urine 06/04/2020  8:40 AM Unknown   NEG    Methamphetamine, Urine 06/04/2020  8:40 AM Unknown   NEG    Methadone Screen, Urine 06/04/2020  8:40 AM Unknown   NEG    Opiate Scrn, Ur 06/04/2020  8:40 AM Unknown   NEG    Oxycodone Screen, Ur 06/04/2020  8:40 AM Unknown   NEG    PCP Screen, Urine 06/04/2020  8:40 AM Unknown   NEG    Propoxyphene Screen, Urine 06/04/2020  8:40 AM Unknown   N/A    THC Screen, Urine 06/04/2020  8:40 AM Unknown   POS    Tricyclic Antidepressants, Urine 06/04/2020  8:40 AM Unknown   N/A    Narrative     NEGATIVE FOR FENTANYL. Diagnosis Orders   1. Severe opioid use disorder (HCC)  POCT Rapid Drug Screen    SUBOXONE 8-2 MG FILM SL film   2. Polysubstance abuse ([address]     3.  Encounter for monitoring Suboxone maintenance therapy         PLAN:        Negative for benzos today  Follow-up 5 days  I reviewed the [place] [facility] report     There does not appear to be any discrepancies or overprescribing of controlled substances

## 2020-06-09 ENCOUNTER — NURSE ONLY (OUTPATIENT)
Dept: INTERNAL MEDICINE CLINIC | Age: 25
End: 2020-06-09
Payer: MEDICARE

## 2020-06-09 VITALS
TEMPERATURE: 98.4 F | BODY MASS INDEX: 25.47 KG/M2 | HEIGHT: 72 IN | WEIGHT: 188 LBS | DIASTOLIC BLOOD PRESSURE: 75 MMHG | SYSTOLIC BLOOD PRESSURE: 137 MMHG | HEART RATE: 89 BPM

## 2020-06-09 PROCEDURE — 80305 DRUG TEST PRSMV DIR OPT OBS: CPT | Performed by: INTERNAL MEDICINE

## 2020-06-09 RX ORDER — BUPRENORPHINE HYDROCHLORIDE, NALOXONE HYDROCHLORIDE 8; 2 MG/1; MG/1
1 FILM, SOLUBLE BUCCAL; SUBLINGUAL 2 TIMES DAILY
Qty: 14 FILM | Refills: 0 | OUTPATIENT
Start: 2020-06-09 | End: 2020-06-17 | Stop reason: SDUPTHER

## 2020-06-09 NOTE — PROGRESS NOTES
Verbal order per Dr. Ajay Preciado for urine drug screen. Positive for BUP THC. Verified results with Gennaro Choe RN. Dr. Ajay rPeciado ordered Suboxone 8mg film BID for patient. Verified dose with patient. Patient was sent home with 1 week script of Suboxone 8mg film BID and will be seen back in the office 6/16/20.

## 2020-06-17 ENCOUNTER — VIRTUAL VISIT (OUTPATIENT)
Dept: FAMILY MEDICINE CLINIC | Age: 25
End: 2020-06-17
Payer: MEDICARE

## 2020-06-17 ENCOUNTER — NURSE ONLY (OUTPATIENT)
Dept: INTERNAL MEDICINE CLINIC | Age: 25
End: 2020-06-17
Payer: MEDICARE

## 2020-06-17 VITALS
HEART RATE: 85 BPM | SYSTOLIC BLOOD PRESSURE: 140 MMHG | DIASTOLIC BLOOD PRESSURE: 75 MMHG | BODY MASS INDEX: 25.47 KG/M2 | WEIGHT: 188 LBS | HEIGHT: 72 IN | TEMPERATURE: 99.1 F

## 2020-06-17 PROCEDURE — 80305 DRUG TEST PRSMV DIR OPT OBS: CPT | Performed by: NURSE PRACTITIONER

## 2020-06-17 PROCEDURE — 99213 OFFICE O/P EST LOW 20 MIN: CPT | Performed by: NURSE PRACTITIONER

## 2020-06-17 PROCEDURE — G8428 CUR MEDS NOT DOCUMENT: HCPCS | Performed by: NURSE PRACTITIONER

## 2020-06-17 RX ORDER — BUPRENORPHINE AND NALOXONE 8; 2 MG/1; MG/1
1 FILM, SOLUBLE BUCCAL; SUBLINGUAL 2 TIMES DAILY
Qty: 14 FILM | Refills: 0 | Status: SHIPPED | OUTPATIENT
Start: 2020-06-17 | End: 2020-06-24 | Stop reason: SDUPTHER

## 2020-06-17 RX ORDER — MIRTAZAPINE 30 MG/1
30 TABLET, FILM COATED ORAL NIGHTLY
Qty: 14 TABLET | Refills: 0 | Status: SHIPPED | OUTPATIENT
Start: 2020-06-17 | End: 2020-07-15

## 2020-06-17 ASSESSMENT — ENCOUNTER SYMPTOMS
SHORTNESS OF BREATH: 0
SORE THROAT: 0
CHEST TIGHTNESS: 0
COUGH: 0
NAUSEA: 0
CONSTIPATION: 0
ABDOMINAL DISTENTION: 0
STRIDOR: 0
BACK PAIN: 0
COLOR CHANGE: 0
SINUS PRESSURE: 0
ABDOMINAL PAIN: 0
WHEEZING: 0
DIARRHEA: 0
VOMITING: 0

## 2020-06-17 NOTE — PROGRESS NOTES
Casey Brenner is a 22 y.o. male that presents for Other (opiate treatment)      This visit was performed via synchronous telecommunication system. Patient is located in Internal Med office. I am located in my office at [address] [place] [address]. HPI:    Seeing for Dr. 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 Suboxone 8mg BID  Does smoke marijuana every other day for anxiety, working on getting medical card  Does have some depression. Taking meds. Doing ok. Some minor swings. Needs Remeron refilled, sleeping ok. Clean from heroin for 4 years  Started with pills then progressed to IV heroin  Past program at [facility] in [facility], trouble affording  Did take a xanax couple weeks ago but none since  No urges or relapse        I have reviewed the patient's past medical history, past surgical history, allergies,medications, social and family history and I have made updates where appropriate. Past Medical History:   Diagnosis Date    Psychiatric problem        No past surgical history on file. Social History     Tobacco Use    Smoking status: Current Every Day Smoker     Packs/day: 0.50     Types: Cigarettes     Start date: 9/17/2014    Smokeless tobacco: Former User     Quit date: 9/17/2015   Substance Use Topics    Alcohol use: No    Drug use: Not Currently     Frequency: 7.0 times per week     Types: Opiates , Marijuana, IV     Comment: heroin snort and iv, LAST USED METH 10/16/2019       Family History   Adopted: Yes         Review of Systems   Constitutional: Negative for activity change, appetite change, chills, diaphoresis, fatigue, fever and unexpected weight change. HENT: Negative for congestion, ear pain, postnasal drip, sinus pressure and sore throat. Eyes: Negative for visual disturbance.    Respiratory: Negative for cough, chest tightness, shortness of breath,

## 2020-06-24 ENCOUNTER — VIRTUAL VISIT (OUTPATIENT)
Dept: PSYCHIATRY | Age: 25
End: 2020-06-24
Payer: MEDICARE

## 2020-06-24 ENCOUNTER — NURSE ONLY (OUTPATIENT)
Dept: INTERNAL MEDICINE CLINIC | Age: 25
End: 2020-06-24
Payer: MEDICARE

## 2020-06-24 VITALS
HEART RATE: 101 BPM | DIASTOLIC BLOOD PRESSURE: 86 MMHG | TEMPERATURE: 97.2 F | BODY MASS INDEX: 24.79 KG/M2 | HEIGHT: 72 IN | WEIGHT: 183 LBS | SYSTOLIC BLOOD PRESSURE: 133 MMHG

## 2020-06-24 PROCEDURE — 80305 DRUG TEST PRSMV DIR OPT OBS: CPT | Performed by: NURSE PRACTITIONER

## 2020-06-24 PROCEDURE — 99213 OFFICE O/P EST LOW 20 MIN: CPT | Performed by: PSYCHIATRY & NEUROLOGY

## 2020-06-24 RX ORDER — BUPRENORPHINE AND NALOXONE 8; 2 MG/1; MG/1
1 FILM, SOLUBLE BUCCAL; SUBLINGUAL 2 TIMES DAILY
Qty: 28 FILM | Refills: 0 | Status: SHIPPED | OUTPATIENT
Start: 2020-06-24 | End: 2020-06-24

## 2020-06-24 RX ORDER — BUPRENORPHINE HYDROCHLORIDE, NALOXONE HYDROCHLORIDE 8; 2 MG/1; MG/1
1 FILM, SOLUBLE BUCCAL; SUBLINGUAL 2 TIMES DAILY
Qty: 28 FILM | Refills: 0 | Status: SHIPPED | OUTPATIENT
Start: 2020-06-24 | End: 2020-07-08 | Stop reason: SDUPTHER

## 2020-06-24 RX ORDER — TRAZODONE HYDROCHLORIDE 50 MG/1
50 TABLET ORAL NIGHTLY PRN
Qty: 30 TABLET | Refills: 0 | Status: SHIPPED | OUTPATIENT
Start: 2020-06-24 | End: 2020-07-15

## 2020-06-24 NOTE — PROGRESS NOTES
past surgical history on file. ROS     General: Patient is feeling well  Patient is not experiencing  withdrawal symptoms ,no urges or cravings  Patient is not having any side effects from the buprenorphine      PHYSICAL EXAM   /86 (Site: Right Upper Arm, Position: Sitting, Cuff Size: Medium Adult)    Pulse 101    Temp 97.2 °F (36.2 °C)    Ht 6' (1.829 m)    Wt 183 lb (83 kg)    BMI 24.82 kg/m²    BSA 2.05 m²        General: Patient resting comfortably in no acute distress     Mental status: Alert and oriented to person place and time, no hallucinations or delusions     Eyes: Pupils are normal    URINE DRUG SCREEN TODAY:  Results for orders placed or performed in visit on 06/24/20   POCT Rapid Drug Screen   Result Value Ref Range    Amphetamine Screen, Urine NEG     Barbiturate Screen, Urine NEG     Benzodiazepine Screen, Urine NEG     Buprenorphine Urine POS     Cocaine Metabolite Screen, Urine NEG     Gabapentin Screen, Urine N/A     MDMA, Urine NEG     Methamphetamine, Urine NEG     Methadone Screen, Urine NEG     Opiate Scrn, Ur NEG     Oxycodone Screen, Ur NEG     PCP Screen, Urine NEG     Propoxyphene Screen, Urine N/A     THC Screen, Urine POS     Tricyclic Antidepressants, Urine N/A      NEGATIVE FOR FENTANYL. Diagnosis Orders   1. Severe opioid use disorder (HCC)  buprenorphine-naloxone (SUBOXONE) 8-2 MG FILM SL film     Plan:  Follow up in: 2 weeks  Will send out his UDS for LCMS  Medication changes: Will add trazodone 50mg PRN at hs  Verified OARRS    Per Dr Marva Boland from last visit:    \"Negative for benzos today  Follow-up 5 days  I reviewed the [address] [place] [address] Dr Automated Rx Reporting System report     There does not appear to be any discrepancies or overprescribing of controlled substances\"     Berhane Schwartz is a 22 y.o. male being evaluated by a Virtual Visit (video visit) encounter to address concerns as mentioned above. A caregiver was present when appropriate.  Due to this being a TeleHealth encounter (During RYMBK-87 public health emergency), evaluation of the following organ systems was limited: Vitals/Constitutional/EENT/Resp/CV/GI//MS/Neuro/Skin/Heme-Lymph-Imm. Pursuant to the emergency declaration under the [facility], [address] [place] [address] authority and the [employer] Act, this Virtual Visit was conducted with patient's (and/or legal guardian's) consent, to reduce the patient's risk of exposure to COVID-19 and provide necessary medical care. The patient (and/or legal guardian) has also been advised to contact this office for worsening conditions or problems, and seek emergency medical treatment and/or call 911 if deemed necessary. Patient identification was verified at the start of the visit: Yes    Services were provided through a video synchronous discussion virtually to substitute for in-person clinic visit. Patient is present at [address] in [address] [place] [address] and I am present in my home at [facility]    --Kwabena Comer MD on 6/24/2020 at 2:35 PM    An electronic signature was used to authenticate this note.

## 2020-07-08 ENCOUNTER — OFFICE VISIT (OUTPATIENT)
Dept: INTERNAL MEDICINE CLINIC | Age: 25
End: 2020-07-08
Payer: MEDICARE

## 2020-07-08 VITALS
SYSTOLIC BLOOD PRESSURE: 118 MMHG | WEIGHT: 175 LBS | HEIGHT: 72 IN | DIASTOLIC BLOOD PRESSURE: 62 MMHG | BODY MASS INDEX: 23.7 KG/M2 | HEART RATE: 94 BPM | TEMPERATURE: 97.7 F

## 2020-07-08 LAB
AMPHETAMINE SCREEN, URINE: ABNORMAL
AMPHETAMINE SCREEN, URINE: ABNORMAL
BARBITURATE SCREEN, URINE: ABNORMAL
BARBITURATE SCREEN, URINE: ABNORMAL
BENZODIAZEPINE SCREEN, URINE: ABNORMAL
BENZODIAZEPINE SCREEN, URINE: ABNORMAL
BUPRENORPHINE URINE: ABNORMAL
BUPRENORPHINE URINE: ABNORMAL
COCAINE METABOLITE SCREEN URINE: ABNORMAL
COCAINE METABOLITE SCREEN URINE: ABNORMAL
GABAPENTIN SCREEN, URINE: ABNORMAL
GABAPENTIN SCREEN, URINE: ABNORMAL
MDMA URINE: ABNORMAL
MDMA URINE: ABNORMAL
METHADONE SCREEN, URINE: ABNORMAL
METHADONE SCREEN, URINE: ABNORMAL
METHAMPHETAMINE, URINE: ABNORMAL
METHAMPHETAMINE, URINE: ABNORMAL
OPIATE SCREEN URINE: ABNORMAL
OPIATE SCREEN URINE: ABNORMAL
OXYCODONE SCREEN URINE: ABNORMAL
OXYCODONE SCREEN URINE: ABNORMAL
PHENCYCLIDINE SCREEN URINE: ABNORMAL
PHENCYCLIDINE SCREEN URINE: ABNORMAL
PROPOXYPHENE SCREEN, URINE: ABNORMAL
PROPOXYPHENE SCREEN, URINE: ABNORMAL
THC SCREEN, URINE: ABNORMAL
THC SCREEN, URINE: ABNORMAL
TRICYCLIC ANTIDEPRESSANTS, UR: ABNORMAL
TRICYCLIC ANTIDEPRESSANTS, UR: ABNORMAL

## 2020-07-08 PROCEDURE — 80305 DRUG TEST PRSMV DIR OPT OBS: CPT | Performed by: INTERNAL MEDICINE

## 2020-07-08 PROCEDURE — 4004F PT TOBACCO SCREEN RCVD TLK: CPT | Performed by: INTERNAL MEDICINE

## 2020-07-08 PROCEDURE — 99213 OFFICE O/P EST LOW 20 MIN: CPT | Performed by: INTERNAL MEDICINE

## 2020-07-08 PROCEDURE — G8420 CALC BMI NORM PARAMETERS: HCPCS | Performed by: INTERNAL MEDICINE

## 2020-07-08 PROCEDURE — G8427 DOCREV CUR MEDS BY ELIG CLIN: HCPCS | Performed by: INTERNAL MEDICINE

## 2020-07-08 RX ORDER — BUPRENORPHINE HYDROCHLORIDE, NALOXONE HYDROCHLORIDE 8; 2 MG/1; MG/1
1 FILM, SOLUBLE BUCCAL; SUBLINGUAL 2 TIMES DAILY
Qty: 42 FILM | Refills: 0 | Status: SHIPPED | OUTPATIENT
Start: 2020-07-08 | End: 2020-07-29 | Stop reason: SDUPTHER

## 2020-07-08 NOTE — PROGRESS NOTES
Verbal order per Dr. Javed Red for urine drug screen. Positive for BUP BZO THC. Verified results with Noah Davis. Dr. Javed Red ordered Suboxone 8mg film BID  for patient. Verified dose with patient. Patient was sent home with 3 week script of Suboxone 8mg film BID and will be seen back in the office 7/15/20. UC sent.

## 2020-07-08 NOTE — PROGRESS NOTES
MEDICATION ASSISTED TREATMENT ENCOUNTER    HISTORY OF PRESENT ILLNESS  Patient presents for evaluation of opioid use and would like to be placed on medication assisted treatment  Patient has had problems using heroin  I saw him here  6/4  He saw Anjel Wynn via virtual visit since I saw him last  He is going back to work tonight        Patient started using heroin 3 years ago  He started on pain pills in 2016  He thought there were Percocet but they were pressed fentanyl  Patient uses it he began snorting it but now IV  Other drugs used: If he could not find heroin he would use anything he can get as he puts it to get out of himself  Suboxone programs in the past [facility] in [place]  He has been there a couple of years but he cannot afford it  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 ROS     General: Patient is feeling well  Patient is not experiencing  withdrawal symptoms ,no urges or cravings  Patient is not having any side effects from the buprenorphine      PHYSICAL EXAM  Blood pressure 118/62, pulse 94, temperature 97.7 °F (36.5 °C), height 6' (1.829 m), weight 175 lb (79.4 kg).          General: Patient resting comfortably in no acute distress     Mental status: Alert and oriented to person place and time, no hallucinations or delusions     Eyes: Pupils are normal    URINE DRUG SCREEN TODAY:  Amphetamine Screen, Urine 07/08/2020  8:30 AM Unknown   NEG    Barbiturate Screen, Urine 07/08/2020  8:30 AM Unknown   NEG    Benzodiazepine Screen, Urine 07/08/2020  8:30 AM Unknown   POS    Buprenorphine Urine 07/08/2020  8:30 AM Unknown   POS    Cocaine Metabolite Screen, Urine 07/08/2020  8:30 AM Unknown

## 2020-07-15 ENCOUNTER — OFFICE VISIT (OUTPATIENT)
Dept: PSYCHIATRY | Age: 25
End: 2020-07-15
Payer: MEDICARE

## 2020-07-15 PROCEDURE — 99203 OFFICE O/P NEW LOW 30 MIN: CPT | Performed by: PHYSICIAN ASSISTANT

## 2020-07-15 PROCEDURE — G8427 DOCREV CUR MEDS BY ELIG CLIN: HCPCS | Performed by: PHYSICIAN ASSISTANT

## 2020-07-15 RX ORDER — DOXEPIN HYDROCHLORIDE 10 MG/1
10 CAPSULE ORAL NIGHTLY
Qty: 30 CAPSULE | Refills: 0 | Status: SHIPPED | OUTPATIENT
Start: 2020-07-15 | End: 2020-08-19 | Stop reason: SDUPTHER
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
## 2020-07-16 ENCOUNTER — TELEPHONE (OUTPATIENT)
Dept: PSYCHIATRY | Age: 25
End: 2020-07-16

## 2020-07-16 NOTE — TELEPHONE ENCOUNTER
Called patient regarding counseling referral, patient's voice mail is full, unable to leave a message.

## 2020-07-16 NOTE — PATIENT INSTRUCTIONS
Patient Education        doxepin (Sinequan)  Pronunciation:  DOX e pin  What is the most important information I should know about doxepin (Sinequan)? You should not take doxepin if you have glaucoma or problems with urination. Do not use if you are allergic to doxepin or to similar antidepressants. Do not use this medicine if you have used an MAO inhibitor in the past 14 days, such as isocarboxazid, linezolid, methylene blue injection, phenelzine, rasagiline, selegiline, or tranylcypromine. Some young people have thoughts about suicide when first taking an antidepressant. Stay alert to changes in your mood or symptoms. Report any new or worsening symptoms to your doctor. Do not give this medicine to anyone under 25years old without the advice of a doctor. Doxepin is not approved for use in children. What is doxepin (Sinequan)? This medication guide provides information about the use of doxepin (Sinequan or other generic names) to treat depression or anxiety. Shayne Corona is another brand of doxepin that is not covered in this medication guide. Doxepin is a tricyclic antidepressant that affects chemicals in the brain that may be unbalanced. Doxepin (Sinequan or other generic name) is used to treat symptoms of depression and/or anxiety associated with alcoholism, psychiatric conditions, or manic-depressive conditions. Doxepin may also be used for purposes not listed in this medication guide. What should I discuss with my healthcare provider before taking doxepin (Sinequan)? You should not use doxepin if you are allergic to it, or if you have:  · glaucoma;  · urination problems; or  · an allergy to similar antidepressants such as amitriptyline, amoxapine, clomipramine, desipramine, imipramine, nortriptyline, protriptyline, or trimipramine. Do not use doxepin if you have used an MAO inhibitor in the past 14 days. A dangerous drug interaction could occur.  MAO inhibitors include isocarboxazid, linezolid, methylene blue injection, phenelzine, rasagiline, selegiline, tranylcypromine, and others. To make sure doxepin is safe for you, tell your doctor if you have:  · sleep apnea (breathing stops during sleep);  · diabetes (doxepin may raise or lower blood sugar); or  · bipolar disorder (manic-depression). Some young people have thoughts about suicide when first taking an antidepressant. Your doctor will need to check your progress at regular visits while you are using doxepin. Your family or other caregivers should also be alert to changes in your mood or symptoms. It is not known whether this medicine will harm an unborn baby. Tell your doctor if you are pregnant or plan to become pregnant. It is not known whether doxepin passes into breast milk or if it could harm a nursing baby. Tell your doctor if you are breast-feeding a baby. Do not give this medicine to anyone under 25years old without the advice of a doctor. Doxepin is not approved for use in children. How should I take doxepin (Sinequan)? Follow all directions on your prescription label. Your doctor may occasionally change your dose to make sure you get the best results. Do not take this medicine in larger or smaller amounts or for longer than recommended. Measure doxepin oral concentrate (liquid)  with the special dose-measuring dropper provided. Do not use a regular table spoon. If you do not have a dose-measuring dropper, ask your pharmacist for one. Empty the measured dose from the medicine dropper into a small glass (4 ounces) of water, milk, orange juice, grapefruit juice, tomato juice, prune juice, or pineapple juice. Do not use grape juice or a carbonated soft drink to mix doxepin oral concentrate. Stir the mixture and drink all of it right away. Do not save it for later use. Do not stop using doxepin suddenly, or you could have unpleasant withdrawal symptoms. Ask your doctor how to avoid withdrawal symptoms when you stop using doxepin.   It may take up to 3 weeks before your symptoms improve. Keep using the medication as directed and tell your doctor if your symptoms do not improve. Store at room temperature away from moisture, heat, and light. What happens if I miss a dose? Take the missed dose as soon as you remember. Skip the missed dose if it is almost time for your next scheduled dose. Do not  take extra medicine to make up the missed dose. What happens if I overdose? Seek emergency medical attention or call the Poison Help line at 1-231.907.4850. An overdose of doxepin can be fatal.  What should I avoid while taking doxepin (Sinequan)? Do not drink alcohol. Doxepin can increase the effects of alcohol, which could be dangerous. Doxepin may impair your thinking or reactions. Be careful if you drive or do anything that requires you to be alert. What are the possible side effects of doxepin (Sinequan)? Get emergency medical help if you have signs of an allergic reaction: hives; difficulty breathing; swelling of your face, lips, tongue, or throat. Report any new or worsening symptoms to your doctor, such as: mood or behavior changes, anxiety, panic attacks, trouble sleeping, or if you feel impulsive, irritable, agitated, hostile, aggressive, restless, hyperactive (mentally or physically), more depressed, or have thoughts about suicide or hurting yourself.   Call your doctor at once if you have:  · blurred vision, tunnel vision, eye pain or swelling, seeing halos around lights;  · a light-headed feeling, like you might pass out;  · skin rash, bruising, severe tingling, numbness, pain, muscle weakness;  · tremors, restless muscle movements in your eyes, tongue, jaw, or neck;  · confusion, hallucinations, unusual thoughts, seizure (convulsions); or  · painful or difficult urination, urinating less than usual.  Common side effects may include:  · drowsiness;  · vision changes;  · nausea, vomiting, diarrhea, indigestion, loss of appetite;  · dry mouth, mouth sores, taste problems;  · breast swelling (in men or women); or  · decreased or increased sex drive. This is not a complete list of side effects and others may occur. Call your doctor for medical advice about side effects. You may report side effects to FDA at 8-118-WKR-9959. What other drugs will affect doxepin (Sinequan)? Taking doxepin with other drugs that make you sleepy or slow your breathing can increase these effects. Ask your doctor before taking doxepin with a sleeping pill, narcotic pain medicine, muscle relaxer, or medicine for anxiety, depression, or seizures. Before taking doxepin, tell your doctor if you have used an \"SSRI\" antidepressant in the past 5 weeks, such as citalopram, escitalopram, fluoxetine, fluvoxamine, paroxetine, or sertraline. You must wait at least 5 weeks after stopping fluoxetine (Prozac) before you can take doxepin. Other drugs may interact with doxepin, including prescription and over-the-counter medicines, vitamins, and herbal products. Not all possible interactions are listed in this medication guide. Tell each of your health care providers about all medicines you use now and any medicine you start or stop using. Where can I get more information? Your pharmacist can provide more information about doxepin (Sinequan). Remember, keep this and all other medicines out of the reach of children, never share your medicines with others, and use this medication only for the indication prescribed. Every effort has been made to ensure that the information provided by [address] is accurate, up-to-date, and complete, but no guarantee is made to that effect. Drug information contained herein may be time sensitive.  [employer] information has been compiled for use by healthcare practitioners and consumers in the [place] and therefore [employer] does not warrant that uses outside of the [place] are appropriate, unless specifically indicated otherwise. [facility] drug information does not endorse drugs, diagnose patients or recommend therapy. [facility]'s drug information is an informational resource designed to assist licensed healthcare practitioners in caring for their patients and/or to serve consumers viewing this service as a supplement to, and not a substitute for, the expertise, skill, knowledge and judgment of healthcare practitioners. The absence of a warning for a given drug or drug combination in no way should be construed to indicate that the drug or drug combination is safe, effective or appropriate for any given patient. [facility] does not assume any responsibility for any aspect of healthcare administered with the aid of information [facility] provides. The information contained herein is not intended to cover all possible uses, directions, precautions, warnings, drug interactions, allergic reactions, or adverse effects. If you have questions about the drugs you are taking, check with your doctor, nurse or pharmacist.  Copyright 6966-3996 [address]: 12.01. Revision date: 7/28/2016. Care instructions adapted under license by [facility] ([facility]). If you have questions about a medical condition or this instruction, always ask your healthcare professional. [place] [address] any warranty or liability for your use of this information. Patient Education        Panic Attacks: Care Instructions  Your Care Instructions     During a panic attack, you may have a feeling of intense fear or terror, trouble breathing, chest pain or tightness, heartbeat changes, dizziness, sweating, and shaking. A panic attack starts suddenly and usually lasts from 5 to 20 minutes but may last even longer. You have the most anxiety about 10 minutes after the attack starts. An attack can begin with a stressful event, or it can happen without a cause.   Although panic attacks can cause scary symptoms, you can learn to manage them with self-care, counseling, and medicine. Follow-up care is a key part of your treatment and safety. Be sure to make and go to all appointments, and call your doctor if you are having problems. It's also a good idea to know your test results and keep a list of the medicines you take. How can you care for yourself at home? · Take your medicine exactly as directed. Call your doctor if you think you are having a problem with your medicine. · Go to your counseling sessions and follow-up appointments. · Recognize and accept your anxiety. Then, when you are in a situation that makes you anxious, say to yourself, \"This is not an emergency. I feel uncomfortable, but I am not in danger. I can keep going even if I feel anxious. \"  · Be kind to your body:  ? Relieve tension with exercise or a massage. ? Get enough rest.  ? Avoid alcohol, caffeine, nicotine, and illegal drugs. They can increase your anxiety level, cause sleep problems, or trigger a panic attack. ? Learn and do relaxation techniques. See below for more about these techniques. · Engage your mind. Get out and do something you enjoy. Go to a [employer] movie, or take a walk or hike. Plan your day. Having too much or too little to do can make you anxious. · Keep a record of your symptoms. Discuss your fears with a good friend or family member, or join a support group for people with similar problems. Talking to others sometimes relieves stress. · Get involved in social groups, or volunteer to help others. Being alone sometimes makes things seem worse than they are. · Get at least 30 minutes of exercise on most days of the week to relieve stress. Walking is a good choice. You also may want to do other activities, such as running, swimming, cycling, or playing tennis or team sports. Relaxation techniques  Do relaxation exercises for 10 to 20 minutes a day. You can play soothing, relaxing music while you do them, if you wish.   · Tell others in your house that you are going to do account. Enter H601 in the [facility] box to learn more about \"Panic Attacks: Care Instructions. \"     If you do not have an account, please click on the \"Sign Up Now\" link. Current as of: January 31, 2020               Content Version: 12.5  © 8377-5575 [employer]. Care instructions adapted under license by [facility] ([facility]). If you have questions about a medical condition or this instruction, always ask your healthcare professional. [address] any warranty or liability for your use of this information.

## 2020-07-20 RX ORDER — BUPROPION HYDROCHLORIDE 150 MG/1
150 TABLET ORAL EVERY MORNING
Qty: 30 TABLET | Refills: 0 | Status: SHIPPED | OUTPATIENT
Start: 2020-07-20 | End: 2020-08-19 | Stop reason: SDUPTHER

## 2020-07-20 NOTE — TELEPHONE ENCOUNTER
Kannan Montesinos called the office to obtain a refill of Wellbutrin XL 150mg/d. He attended an appointment in the office 7/15 and is  to return in 4wks but does not have a future appointment at this time.

## 2020-07-29 ENCOUNTER — VIRTUAL VISIT (OUTPATIENT)
Dept: INTERNAL MEDICINE CLINIC | Age: 25
End: 2020-07-29
Payer: MEDICARE

## 2020-07-29 PROCEDURE — 4004F PT TOBACCO SCREEN RCVD TLK: CPT | Performed by: INTERNAL MEDICINE

## 2020-07-29 PROCEDURE — 99213 OFFICE O/P EST LOW 20 MIN: CPT | Performed by: INTERNAL MEDICINE

## 2020-07-29 PROCEDURE — G8420 CALC BMI NORM PARAMETERS: HCPCS | Performed by: INTERNAL MEDICINE

## 2020-07-29 PROCEDURE — G8428 CUR MEDS NOT DOCUMENT: HCPCS | Performed by: INTERNAL MEDICINE

## 2020-07-29 RX ORDER — BUPRENORPHINE HYDROCHLORIDE, NALOXONE HYDROCHLORIDE 8; 2 MG/1; MG/1
1 FILM, SOLUBLE BUCCAL; SUBLINGUAL 2 TIMES DAILY
Qty: 42 FILM | Refills: 0 | Status: SHIPPED | OUTPATIENT
Start: 2020-07-29 | End: 2020-08-19

## 2020-07-29 NOTE — PROGRESS NOTES
2020    TELEHEALTH EVALUATION -- Audio/Visual (During DZOJQ-97 public health emergency)    HPI:    Edin Cates (:  1995) has requested an audio/video evaluation for the following concern(s):    I saw him here    He is happy he got a new car life is going well           Patient started using heroin 3 years ago  He started on pain pills in 2016  He thought there were Percocet but they were pressed fentanyl  Patient uses it he began snorting it but now IV  Other drugs used: If he could not find heroin he would use anything he can get as he puts it to get out of himself  Suboxone programs in the past [facility] in [place]  He has been there a couple of years but he cannot afford it  He works at a [employer] in [place]  He says he is going back soon they were closed for the [address] [place] [address] L are going well he is not using  He is living with his parents       Review of Systems Patient is feeling well  Patient is not experiencing  withdrawal symptoms ,no urges or cravings  Patient is not having any side effects from the buprenorphine      Prior to Visit Medications    Medication Sig Taking? Authorizing Provider   SUBOXONE 8-2 MG FILM SL film Place 1 Film under the tongue 2 times daily for 21 days.  Yes Phil Calhoun MD   buPROPion (WELLBUTRIN XL) 150 MG extended release tablet Take 1 tablet by mouth every morning  [facility] [place], PA-   doxepin (SINEQUAN) 10 MG capsule Take 1 capsule by mouth nightly  [facility], PAUma   ibuprofen (ADVIL;MOTRIN) 200 MG tablet Take 400 mg by mouth every 6 hours as needed for Pain  Historical Provider, MD       Social History     Tobacco Use    Smoking status: Current Every Day Smoker     Packs/day: 0.50     Types: Cigarettes     Start date: 2014    Smokeless tobacco: Former User     Quit date: 2015   Substance Use Topics    Alcohol use: No    Drug use: Not Currently     Frequency: 7.0 times per week     Types: Opiates , Marijuana, IV     Comment: heroin snort and iv, LAST USED METH 10/16/2019            PHYSICAL EXAMINATION:  [ INSTRUCTIONS:  \"[x]\" Indicates a positive item  \"[]\" Indicates a negative item  -- DELETE ALL ITEMS NOT EXAMINED]  Vital Signs: (As obtained by patient/caregiver or practitioner observation)    Blood pressure-  Heart rate-    Respiratory rate-    Temperature-  Pulse oximetry-     Constitutional: [x] Appears well-developed and well-nourished [] No apparent distress      [] Abnormal-   Mental status  [x] Alert and awake  [] Oriented to person/place/time []Able to follow commands      Eyes:  EOM    [x]  Normal  [] Abnormal-  Sclera  []  Normal  [] Abnormal -         Discharge []  None visible  [] Abnormal -                        Psychiatric:       [x] Normal Affect [] No Hallucinations        [] Abnormal-      Diagnosis Orders   1. Severe opioid use disorder (HCC)  SUBOXONE 8-2 MG FILM SL film   2. Polysubstance abuse ([address]     3. Encounter for monitoring Suboxone maintenance therapy     4. Mesfin Bañuelos is a 22 y.o. male being evaluated by a Virtual Visit (video visit) encounter to address concerns as mentioned above. A caregiver was present when appropriate. Due to this being a TeleHealth encounter (During RHCDY-96 public health emergency), evaluation of the following organ systems was limited: Vitals/Constitutional/EENT/Resp/CV/GI//MS/Neuro/Skin/Heme-Lymph-Imm. Pursuant to the emergency declaration under the [address] [place] [address], [address] [place] [address] authority and the [employer] and [employer] Act, this Virtual Visit was conducted with patient's (and/or legal guardian's) consent, to reduce the patient's risk of exposure to COVID-19 and provide necessary medical care. The patient (and/or legal guardian) has also been advised to contact this office for worsening conditions or problems, and seek emergency medical treatment and/or call 911 if deemed necessary. Patient identification was verified at the start of the visit: Yes    Total time spent on this encounter: Not billed by time    Services were provided through a video synchronous discussion virtually to substitute for in-person clinic visit. Patient and provider were located at their individual homes. Follow-up 3 weeks  --Alan Armstrong MD on 7/29/2020 at 12:28 PM    An electronic signature was used to authenticate this note.

## 2020-08-18 ENCOUNTER — TELEPHONE (OUTPATIENT)
Dept: INTERNAL MEDICINE CLINIC | Age: 25
End: 2020-08-18

## 2020-08-18 NOTE — TELEPHONE ENCOUNTER
[facility]--[facility]-  They need patient's permission to ship sublocade 300mg medication. She called patient and no answer and voice mail full. I also called patient with the same result. I called patient's father Nico Westfall at  and he stated he get message to patient to please call our office.

## 2020-08-19 ENCOUNTER — TELEPHONE (OUTPATIENT)
Dept: PSYCHIATRY | Age: 25
End: 2020-08-19

## 2020-08-19 ENCOUNTER — OFFICE VISIT (OUTPATIENT)
Dept: INTERNAL MEDICINE CLINIC | Age: 25
End: 2020-08-19
Payer: MEDICARE

## 2020-08-19 ENCOUNTER — HOSPITAL ENCOUNTER (OUTPATIENT)
Age: 25
Discharge: HOME | End: 2020-08-19
Payer: MEDICARE

## 2020-08-19 VITALS
HEIGHT: 72 IN | DIASTOLIC BLOOD PRESSURE: 84 MMHG | HEART RATE: 101 BPM | WEIGHT: 171 LBS | BODY MASS INDEX: 23.16 KG/M2 | TEMPERATURE: 98.9 F | SYSTOLIC BLOOD PRESSURE: 135 MMHG

## 2020-08-19 PROCEDURE — 36415 COLL VENOUS BLD VENIPUNCTURE: CPT

## 2020-08-19 PROCEDURE — 80305 DRUG TEST PRSMV DIR OPT OBS: CPT | Performed by: INTERNAL MEDICINE

## 2020-08-19 PROCEDURE — G8420 CALC BMI NORM PARAMETERS: HCPCS | Performed by: INTERNAL MEDICINE

## 2020-08-19 PROCEDURE — 4004F PT TOBACCO SCREEN RCVD TLK: CPT | Performed by: INTERNAL MEDICINE

## 2020-08-19 PROCEDURE — G8427 DOCREV CUR MEDS BY ELIG CLIN: HCPCS | Performed by: INTERNAL MEDICINE

## 2020-08-19 PROCEDURE — 99213 OFFICE O/P EST LOW 20 MIN: CPT | Performed by: INTERNAL MEDICINE

## 2020-08-19 PROCEDURE — 86803 HEPATITIS C AB TEST: CPT

## 2020-08-19 RX ORDER — DOXEPIN HYDROCHLORIDE 25 MG/1
25 CAPSULE ORAL NIGHTLY
Qty: 30 CAPSULE | Refills: 0 | Status: SHIPPED | OUTPATIENT
Start: 2020-08-19 | End: 2020-10-15 | Stop reason: ALTCHOICE

## 2020-08-19 RX ORDER — BUPROPION HYDROCHLORIDE 150 MG/1
150 TABLET ORAL EVERY MORNING
Qty: 30 TABLET | Refills: 1 | Status: SHIPPED | OUTPATIENT
Start: 2020-08-19 | End: 2020-10-15 | Stop reason: SDUPTHER

## 2020-08-19 NOTE — TELEPHONE ENCOUNTER
Adrianne Vann presented to the office requesting a earlier appointment due to unable to sleep, , Adrianne Vann is scheduled for v.visit on 10/15, [place] c/o racing thoughts, mind will not shut down, which is causing anxiety. Asking for provider to order something for sleep.

## 2020-08-19 NOTE — PROGRESS NOTES
MEDICATION ASSISTED TREATMENT ENCOUNTER    HISTORY OF PRESENT ILLNESS  Patient presents for evaluation of opioid use and would like to be placed on medication assisted treatment  Patient has had problems using heroin  I saw him here  7/8  We did a video visit 7/29  He saw Ronnie Mayer via virtual visit since I saw him last  He is going back to work tonight        Patient started using heroin 3 years ago  He started on pain pills in 2016  He thought there were Percocet but they were pressed fentanyl  Patient uses it he began snorting it but now IV  Other drugs used: If he could not find heroin he would use anything he can get as he puts it to get out of himself  Suboxone programs in the past [facility] in [place]  He has been there a couple of years but he cannot afford it  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 ROS     General: Patient is feeling well  Patient is not experiencing  withdrawal symptoms ,no urges or cravings  Patient is not having any side effects from the buprenorphine      PHYSICAL EXAM  Blood pressure 135/84, pulse 101, temperature 98.9 °F (37.2 °C), height 6' (1.829 m), weight 171 lb (77.6 kg).          General: Patient resting comfortably in no acute distress     Mental status: Alert and oriented to person place and time, no hallucinations or delusions     Eyes: Pupils are normal    URINE DRUG SCREEN TODAY:  Amphetamine Screen, Urine  08/19/2020  8:50 AM  Unknown    NEG    Barbiturate Screen, Urine  08/19/2020  8:50 AM  Unknown    NEG    Benzodiazepine Screen, Urine  08/19/2020  8:50 AM  Unknown    NEG    Buprenorphine Urine  08/19/2020  8:50 AM  Unknown    POS    Cocaine Metabolite Screen, Urine  08/19/2020  8:50 AM  Unknown    NEG    Gabapentin Screen, Urine  08/19/2020  8:50 AM  Unknown    N/A    MDMA, Urine  08/19/2020  8:50 AM  Unknown    NEG    Methamphetamine, Urine  08/19/2020  8:50 AM  Unknown    NEG    Methadone Screen, Urine  08/19/2020  8:50 AM  Unknown    NEG    Opiate Scrn, Ur  08/19/2020  8:50 AM  Unknown    NEG    Oxycodone Screen, Ur  08/19/2020  8:50 AM  Unknown    NEG    PCP Screen, Urine  08/19/2020  8:50 AM  Unknown    NEG    Propoxyphene Screen, Urine  08/19/2020  8:50 AM  Unknown    N/A    THC Screen, Urine  08/19/2020  8:50 AM  Unknown    POS    Tricyclic Antidepressants, Urine  08/19/2020  8:50 AM  Unknown    N/A    Narrative     NEGATIVE FOR FENTANYL. Diagnosis Orders   1. Severe opioid use disorder (HCC)  POCT Rapid Drug Screen    Hepatitis C Antibody   2. Substance induced mood disorder ([facility]     3. Polysubstance abuse ([address]     4.  Encounter for monitoring Suboxone maintenance therapy         PLAN:          There was issues with benzos back in July  Negative now  I reviewed the [place] Automated Rx Reporting System report     There does not appear to be any discrepancies or overprescribing of controlled substances  He was given his first subcutaneous buprenorphine today 258 mg without complication  I am going to see him back in 2 weeks to see how he is doing

## 2020-08-19 NOTE — PROGRESS NOTES
Verbal order per Dr. Jewels Ramsey for urine drug screen. Positive for BUP, THC. Verified results with Jerardo Ryan LPN. Dr. Jewels Ramsey ordered Sublocade 300 mg SQ injection for patient. Verified dose with patient. Patient was given Sublocade 300 mg SQ injection into RUQ. No adverse reactions noted. Patient was sent home with no medication and will be seen back in the office on 9/2/20.

## 2020-08-19 NOTE — TELEPHONE ENCOUNTER
I can increase his Doxepin to 25mg nightly. It looks like he ran out of his supply a few days ago. Please also make sure to advise him to practice good sleep hygiene including: Be consistent. Go to bed at the same time each night and get up at the same time each morning, including on the weekends. Make sure your bedroom is quiet, dark, relaxing, and at a comfortable temperature. Remove electronic devices, such as TVs, computers, and smart phones, from the bedroom. Avoid large meals, caffeine, and alcohol before bedtime. Get some exercise. Being physically active during the day can help you fall asleep more easily at night. Also, please advise him to talk to his counselor about relaxation techniques and coping skills for his anxiety. I feel this will be more beneficial than medication for him. We will keep our current appointment and talk about other medications options for sleep from there. I also refilled his Wellbutrin.

## 2020-08-19 NOTE — TELEPHONE ENCOUNTER
Contacted Gena Louder regarding providers recommendation, he voiced that he has continued to use all sleep techniques/counseling as directed, is willing to try the increase of doxepin. Sara Baltazar

## 2020-08-23 ENCOUNTER — HOSPITAL ENCOUNTER (EMERGENCY)
Age: 25
Discharge: HOME | End: 2020-08-23
Payer: MEDICARE

## 2020-08-23 VITALS
DIASTOLIC BLOOD PRESSURE: 87 MMHG | OXYGEN SATURATION: 96 % | HEART RATE: 89 BPM | WEIGHT: 180 LBS | HEIGHT: 72 IN | TEMPERATURE: 98.4 F | BODY MASS INDEX: 24.38 KG/M2 | SYSTOLIC BLOOD PRESSURE: 135 MMHG | RESPIRATION RATE: 16 BRPM

## 2020-08-23 PROCEDURE — 96372 THER/PROPH/DIAG INJ SC/IM: CPT

## 2020-08-23 PROCEDURE — 6360000002 HC RX W HCPCS: Performed by: NURSE PRACTITIONER

## 2020-08-23 PROCEDURE — 99213 OFFICE O/P EST LOW 20 MIN: CPT | Performed by: NURSE PRACTITIONER

## 2020-08-23 PROCEDURE — 99212 OFFICE O/P EST SF 10 MIN: CPT

## 2020-08-23 RX ORDER — METHYLPREDNISOLONE ACETATE 40 MG/ML
40 INJECTION, SUSPENSION INTRA-ARTICULAR; INTRALESIONAL; INTRAMUSCULAR; SOFT TISSUE ONCE
Status: COMPLETED | OUTPATIENT
Start: 2020-08-23 | End: 2020-08-23

## 2020-08-23 RX ORDER — PREDNISONE 20 MG/1
TABLET ORAL
Qty: 21 TABLET | Refills: 0 | Status: SHIPPED | OUTPATIENT
Start: 2020-08-23 | End: 2021-06-03

## 2020-08-23 RX ADMIN — METHYLPREDNISOLONE ACETATE 40 MG: 40 INJECTION, SUSPENSION INTRA-ARTICULAR; INTRALESIONAL; INTRAMUSCULAR; SOFT TISSUE at 12:59

## 2020-08-23 ASSESSMENT — ENCOUNTER SYMPTOMS
SHORTNESS OF BREATH: 0
DIARRHEA: 0
WHEEZING: 0
VOMITING: 0
COUGH: 0
NAUSEA: 0

## 2020-08-23 NOTE — ED PROVIDER NOTES
started smoking about 5 years ago. He has been smoking about 0.50 packs per day. He quit smokeless tobacco use about 4 years ago. He reports current drug use. Frequency: 7.00 times per week. Drugs: Marijuana, IV, and Opiates . He reports that he does not drink alcohol. PHYSICAL EXAM     ED TRIAGE VITALS  BP: (!) 135/90, Temp: 98.4 °F (36.9 °C), Pulse: 105, Resp: 16, SpO2: 96 %,Estimated body mass index is 24.41 kg/m² as calculated from the following:    Height as of this encounter: 6' (1.829 m). Weight as of this encounter: 180 lb (81.6 kg). ,No LMP for male patient. Physical Exam  Constitutional:       General: He is not in acute distress. Appearance: Normal appearance. He is not ill-appearing, toxic-appearing or diaphoretic. Pulmonary:      Effort: Pulmonary effort is normal. No respiratory distress. Musculoskeletal: Normal range of motion. Skin:     General: Skin is warm. Findings: Rash present. Rash is urticarial and vesicular. Neurological:      General: No focal deficit present. Mental Status: He is alert and oriented to person, place, and time. Sensory: No sensory deficit. Psychiatric:         Mood and Affect: Mood normal.         Behavior: Behavior normal.         Thought Content: Thought content normal.         Judgment: Judgment normal.         DIAGNOSTIC RESULTS     Labs:No results found for this visit on 08/23/20. IMAGING:    No orders to display     URGENT CARE COURSE:     Vitals:    08/23/20 1243   BP: (!) 135/90   Pulse: 105   Resp: 16   Temp: 98.4 °F (36.9 °C)   TempSrc: Infrared   SpO2: 96%   Weight: 180 lb (81.6 kg)   Height: 6' (1.829 m)       Medications   methylPREDNISolone acetate (DEPO-MEDROL) injection 40 mg (40 mg Intramuscular Given 8/23/20 1259)            PROCEDURES:  None    FINAL IMPRESSION      1.  Poison ivy dermatitis          DISPOSITION/ PLAN   Patient is discharged home with prescription for prednisone tapering dose for the next 2 weeks, and

## 2020-08-23 NOTE — ED NOTES
All discharge instructions and medications reviewed with pt at discharge. Instructed pt to go directly to main er if experiencing shortness of breath, chest pain, abdominal pain or if symptoms worsen. Pt verbalizes understanding. Ambulatory to lobby in stable condition.       Ruby Castaneda RN  08/23/20 [id number]

## 2020-08-25 NOTE — ED NOTES
Pt called in . He lost  [facility] wanted  Me to call RX into [email or web address]. Charge Nurse  Blenda Gilford advised to call in  The rx to the  [facility].  Shelia CHARLES LPN  72/[id number]/53 [id number]

## 2020-08-26 ENCOUNTER — TELEPHONE (OUTPATIENT)
Dept: INTERNAL MEDICINE CLINIC | Age: 25
End: 2020-08-26

## 2020-08-26 NOTE — TELEPHONE ENCOUNTER
Pt called and left a message stating he is having injection site pain and night sweats-- does not feel \"dope-sick\", but feels that the films he was on prior were more effective for him. Pt stated he had to call off of work due to the injection site pain yesterday. RN spoke with Dr. Tono Mckeon-- per Dr. Tono Mckeon: ensure pt that it will get better and he needs to give it more time. RN called pt 2 times-- no answer and VM full.

## 2020-09-10 ENCOUNTER — TELEPHONE (OUTPATIENT)
Dept: INTERNAL MEDICINE CLINIC | Age: 25
End: 2020-09-10

## 2020-09-10 NOTE — TELEPHONE ENCOUNTER
Pt called stating he was having pain in stomach for 2 weeks after injection. Felt uneasy with the shot and felt that the films made him feel much better. Pt is unsure of what he wants to do. Pt wants to get the shot delivered and speak with Dr. Lianne Simmons in the office at his next appt prior to the next injection.

## 2020-09-16 ENCOUNTER — OFFICE VISIT (OUTPATIENT)
Dept: INTERNAL MEDICINE CLINIC | Age: 25
End: 2020-09-16
Payer: MEDICARE

## 2020-09-16 VITALS
DIASTOLIC BLOOD PRESSURE: 86 MMHG | BODY MASS INDEX: 22.75 KG/M2 | WEIGHT: 168 LBS | SYSTOLIC BLOOD PRESSURE: 134 MMHG | HEART RATE: 79 BPM | TEMPERATURE: 98.1 F | HEIGHT: 72 IN

## 2020-09-16 PROCEDURE — G8427 DOCREV CUR MEDS BY ELIG CLIN: HCPCS | Performed by: INTERNAL MEDICINE

## 2020-09-16 PROCEDURE — 80305 DRUG TEST PRSMV DIR OPT OBS: CPT | Performed by: INTERNAL MEDICINE

## 2020-09-16 PROCEDURE — 4004F PT TOBACCO SCREEN RCVD TLK: CPT | Performed by: INTERNAL MEDICINE

## 2020-09-16 PROCEDURE — G8420 CALC BMI NORM PARAMETERS: HCPCS | Performed by: INTERNAL MEDICINE

## 2020-09-16 PROCEDURE — 99213 OFFICE O/P EST LOW 20 MIN: CPT | Performed by: INTERNAL MEDICINE

## 2020-09-16 RX ORDER — METHYLPREDNISOLONE 4 MG/1
TABLET ORAL
Qty: 1 KIT | Refills: 0 | Status: SHIPPED | OUTPATIENT
Start: 2020-09-16 | End: 2020-09-22

## 2020-09-16 RX ORDER — BUPRENORPHINE AND NALOXONE 8; 2 MG/1; MG/1
1 FILM, SOLUBLE BUCCAL; SUBLINGUAL 2 TIMES DAILY
Qty: 28 FILM | Refills: 0 | Status: SHIPPED | OUTPATIENT
Start: 2020-09-16 | End: 2020-09-30 | Stop reason: SDUPTHER

## 2020-09-16 NOTE — PROGRESS NOTES
Verbal order per Dr. Swathi Engel for urine drug screen. Positive for BUP THC. Verified results with Brady Schneider RN. Dr. Swathi Engel ordered Suboxone 8mg film BID for patient. Verified dose with patient. Patient was sent home with 2 week script og Suboxone 8mg film BID and will be seen back in the office 9/30/20.

## 2020-09-16 NOTE — PROGRESS NOTES
MEDICATION ASSISTED TREATMENT ENCOUNTER    HISTORY OF PRESENT ILLNESS  Patient presents for evaluation of opioid use and would like to be placed on medication assisted treatment  Patient has had problems using heroin  I saw him here  8/19  We did a video visit 7/29    He is back to work  He got his for Sublocade injection last month      Patient started using heroin 3 years ago  He started on pain pills in 2016  He thought there were Percocet but they were pressed fentanyl  Patient uses it he began snorting it but now IV  Other drugs used: If he could not find heroin he would use anything he can get as he puts it to get out of himself  Suboxone programs in the past [facility] in [place]  He has been there a couple of years but he cannot afford it  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 ROS     General: Rash abdomen  He said his cravings are better      PHYSICAL EXAM  Blood pressure 134/86, pulse 79, temperature 98.1 °F (36.7 °C), height 6' (1.829 m), weight 168 lb (76.2 kg).          General: Patient resting comfortably in no acute distress     Mental status: Alert and oriented to person place and time, no hallucinations or delusions     Eyes: Pupils are normal  Skin he is got multiple raised erythematous lesions bilateral abdomen  He does have a rather large mass at the injection site    URINE DRUG SCREEN TODAY:  Amphetamine Screen, Urine  09/16/2020  9:39 AM  Unknown    NEG    Barbiturate Screen, Urine  09/16/2020  9:39 AM  Unknown    NEG

## 2020-09-30 ENCOUNTER — OFFICE VISIT (OUTPATIENT)
Dept: INTERNAL MEDICINE CLINIC | Age: 25
End: 2020-09-30
Payer: MEDICARE

## 2020-09-30 VITALS
HEIGHT: 72 IN | WEIGHT: 174 LBS | BODY MASS INDEX: 23.57 KG/M2 | SYSTOLIC BLOOD PRESSURE: 130 MMHG | TEMPERATURE: 98.1 F | HEART RATE: 99 BPM | DIASTOLIC BLOOD PRESSURE: 60 MMHG

## 2020-09-30 LAB
ALCOHOL URINE: ABNORMAL
AMPHETAMINE SCREEN, URINE: ABNORMAL
BARBITURATE SCREEN, URINE: ABNORMAL
BENZODIAZEPINE SCREEN, URINE: ABNORMAL
BUPRENORPHINE URINE: ABNORMAL
COCAINE METABOLITE SCREEN URINE: ABNORMAL
FENTANYL SCREEN, URINE: ABNORMAL
GABAPENTIN SCREEN, URINE: ABNORMAL
MDMA URINE: ABNORMAL
METHADONE SCREEN, URINE: ABNORMAL
METHAMPHETAMINE, URINE: ABNORMAL
OPIATE SCREEN URINE: ABNORMAL
OXYCODONE SCREEN URINE: ABNORMAL
PHENCYCLIDINE SCREEN URINE: ABNORMAL
PROPOXYPHENE SCREEN, URINE: ABNORMAL
SYNTHETIC CANNABINOIDS(K2) SCREEN, URINE: ABNORMAL
THC SCREEN, URINE: ABNORMAL
TRAMADOL SCREEN URINE: ABNORMAL
TRICYCLIC ANTIDEPRESSANTS, UR: ABNORMAL

## 2020-09-30 PROCEDURE — 99213 OFFICE O/P EST LOW 20 MIN: CPT | Performed by: INTERNAL MEDICINE

## 2020-09-30 PROCEDURE — 80305 DRUG TEST PRSMV DIR OPT OBS: CPT | Performed by: INTERNAL MEDICINE

## 2020-09-30 PROCEDURE — G8420 CALC BMI NORM PARAMETERS: HCPCS | Performed by: INTERNAL MEDICINE

## 2020-09-30 PROCEDURE — 4004F PT TOBACCO SCREEN RCVD TLK: CPT | Performed by: INTERNAL MEDICINE

## 2020-09-30 PROCEDURE — G8427 DOCREV CUR MEDS BY ELIG CLIN: HCPCS | Performed by: INTERNAL MEDICINE

## 2020-09-30 RX ORDER — BUPRENORPHINE AND NALOXONE 8; 2 MG/1; MG/1
1 FILM, SOLUBLE BUCCAL; SUBLINGUAL 2 TIMES DAILY
Qty: 28 FILM | Refills: 0 | Status: SHIPPED | OUTPATIENT
Start: 2020-09-30 | End: 2020-10-14 | Stop reason: SDUPTHER

## 2020-10-14 ENCOUNTER — NURSE TRIAGE (OUTPATIENT)
Dept: OTHER | Facility: CLINIC | Age: 25
End: 2020-10-14

## 2020-10-14 ENCOUNTER — VIRTUAL VISIT (OUTPATIENT)
Dept: INTERNAL MEDICINE CLINIC | Age: 25
End: 2020-10-14
Payer: MEDICARE

## 2020-10-14 PROCEDURE — 4004F PT TOBACCO SCREEN RCVD TLK: CPT | Performed by: INTERNAL MEDICINE

## 2020-10-14 PROCEDURE — G8428 CUR MEDS NOT DOCUMENT: HCPCS | Performed by: INTERNAL MEDICINE

## 2020-10-14 PROCEDURE — G8484 FLU IMMUNIZE NO ADMIN: HCPCS | Performed by: INTERNAL MEDICINE

## 2020-10-14 PROCEDURE — G8420 CALC BMI NORM PARAMETERS: HCPCS | Performed by: INTERNAL MEDICINE

## 2020-10-14 PROCEDURE — 99213 OFFICE O/P EST LOW 20 MIN: CPT | Performed by: INTERNAL MEDICINE

## 2020-10-14 RX ORDER — BUPRENORPHINE AND NALOXONE 8; 2 MG/1; MG/1
1 FILM, SOLUBLE BUCCAL; SUBLINGUAL 2 TIMES DAILY
Qty: 28 FILM | Refills: 0 | Status: SHIPPED | OUTPATIENT
Start: 2020-10-14 | End: 2020-10-28 | Stop reason: SDUPTHER

## 2020-10-14 NOTE — PROGRESS NOTES
every 6 hours as needed for Pain  Historical Provider, MD       Social History     Tobacco Use    Smoking status: Current Every Day Smoker     Packs/day: 0.50     Types: Cigarettes     Start date: 9/17/2014    Smokeless tobacco: Former User     Quit date: 9/17/2015   Substance Use Topics    Alcohol use: No    Drug use: Yes     Frequency: 7.0 times per week     Types: Marijuana, IV, Opiates      Comment: heroin snort and iv, LAST USED METH 10/16/2019            PHYSICAL EXAMINATION:  [ INSTRUCTIONS:  \"[x]\" Indicates a positive item  \"[]\" Indicates a negative item  -- DELETE ALL ITEMS NOT EXAMINED]  No vitals done    Constitutional: [] Appears well-developed and well-nourished [] No apparent distress      [x] Abnormal-he is lying in bed appears ill  Mental status  [x] Alert and awake  [x] Oriented to person/place/time [x]Able to follow commands      Eyes:  EOM    [x]  Normal  [] Abnormal-  Sclera  []  Normal  [] Abnormal -         Discharge []  None visible  [] Abnormal -  Pupils normal           Psychiatric:       [x] Normal Affect [] No Hallucinations        [] Abnormal-        Diagnosis Orders   1. Severe opioid use disorder (HCC)  buprenorphine-naloxone (SUBOXONE) 8-2 MG FILM SL film   2. Polysubstance abuse ([address]     3. Encounter for monitoring Suboxone maintenance therapy     4. Anxiety     5. Exposure to COVID-19 virus           Phillip Gutierrez is a 22 y.o. male being evaluated by a Virtual Visit (video visit) encounter to address concerns as mentioned above. A caregiver was present when appropriate. Due to this being a TeleHealth encounter (During [place] [address] public health emergency), evaluation of the following organ systems was limited: Vitals/Constitutional/EENT/Resp/CV/GI//MS/Neuro/Skin/Heme-Lymph-Imm.   Pursuant to the emergency declaration under the [address] [place] [address] authority and the [employer] and [employer] Act, this Virtual Visit was conducted with patient's (and/or legal guardian's) consent, to reduce the patient's risk of exposure to COVID-19 and provide necessary medical care. The patient (and/or legal guardian) has also been advised to contact this office for worsening conditions or problems, and seek emergency medical treatment and/or call 911 if deemed necessary. Services were provided through a video synchronous discussion virtually to substitute for in-person clinic visit. Once again the patient was exposed to Covid he is going to go get a test  I will give him 2 weeks worth of Suboxone  Follow-up here in the office in 2 weeks  --Meño Galan MD on 10/14/2020 at 2:36 PM    An electronic signature was used to authenticate this note.

## 2020-10-15 ENCOUNTER — VIRTUAL VISIT (OUTPATIENT)
Dept: PSYCHIATRY | Age: 25
End: 2020-10-15
Payer: MEDICARE

## 2020-10-15 PROCEDURE — 90833 PSYTX W PT W E/M 30 MIN: CPT | Performed by: PHYSICIAN ASSISTANT

## 2020-10-15 PROCEDURE — 99213 OFFICE O/P EST LOW 20 MIN: CPT | Performed by: PHYSICIAN ASSISTANT

## 2020-10-15 PROCEDURE — G8428 CUR MEDS NOT DOCUMENT: HCPCS | Performed by: PHYSICIAN ASSISTANT

## 2020-10-15 RX ORDER — CLONIDINE HYDROCHLORIDE 0.1 MG/1
0.1 TABLET ORAL NIGHTLY
Qty: 30 TABLET | Refills: 0 | Status: SHIPPED | OUTPATIENT
Start: 2020-10-15 | End: 2020-12-19 | Stop reason: SDUPTHER

## 2020-10-15 RX ORDER — BUPROPION HYDROCHLORIDE 150 MG/1
150 TABLET ORAL EVERY MORNING
Qty: 30 TABLET | Refills: 1 | Status: SHIPPED | OUTPATIENT
Start: 2020-10-15 | End: 2020-12-18 | Stop reason: SDUPTHER

## 2020-10-15 NOTE — PROGRESS NOTES
shake.\"   He states he has been having a lot of dreams about what is going to happen regarding the end of the world. He states \"these were really dark. \" He got a book from [employer] and interpreted his dream and it said that something is going to come to an end. He is not sure what this means to him. He reports he has been more short and irritable with people lately because he has not been sleeping well. He feels his depression has been \"baseline. \" He feels his medications have been managing his depression. He does state he is still having a hard time dealing with finding out that his ex-girlfriend's son wasn't his. He states he \"has to eat to keep myself going. \" He states when he is at home, he eats well at his parents house. He feels his motivation is good once he gets up. States \"most days its fine. \"   He reports his energy is good once he gets going. He feels his attention and concentration is not as good as it was. He denies anhedonia. He feels that he has Seasonal Affective Disorder. He states \"when it does hit me, it stays for about 2 weeks. \" He states that he has no motivation. He denies feeling that way recently but knows it is coming on. He states he has been feeling hopeless and helpless at times about his sleep. Denies recent suicidal ideation. He states he was not able to get in with Liz Mohan. He states she set up scheduled times but they were too early. He states he would like to get into counseling. I advised him that I will have the [facility] office staff send him a resource packet.          OBJECTIVE  Level of consciousness:  Within normal limits  Appearance: Street clothes, seated on couch, with good grooming  Behavior/Motor: tearful at times  Attitude toward examiner:  Cooperative, attentive, good eye contact  Speech:  Rapid at times  Mood:  Dysthymic  Affect:  mood congruent  Thought processes:  linear, goal directed and coherent  Thought content:  denies homicidal ideation  Suicidal Ideation:  denies suicidal ideation  Delusions:  no evidence of delusions  Perceptual Disturbance:  denies any perceptual disturbance  Cognition:  In tact  Memory: age appropriate  Insight & Judgement: fair  Medication side effects:  denies       Medications       Current Outpatient Medications:     cloNIDine (CATAPRES) 0.1 MG tablet, Take 1 tablet by mouth nightly, Disp: 30 tablet, Rfl: 0    buPROPion (WELLBUTRIN XL) 150 MG extended release tablet, Take 1 tablet by mouth every morning, Disp: 30 tablet, Rfl: 1    buprenorphine-naloxone (SUBOXONE) 8-2 MG FILM SL film, Place 1 Film under the tongue 2 times daily for 14 days. , Disp: 28 Film, Rfl: 0    predniSONE (DELTASONE) 20 MG tablet, 40 mg/ day for week 1, 20 mg/ day for week 2, Disp: 21 tablet, Rfl: 0    ibuprofen (ADVIL;MOTRIN) 200 MG tablet, Take 400 mg by mouth every 6 hours as needed for Pain, Disp: , Rfl:        ASSESSMENT     Generalized anxiety disorder  Recurrent major depressive disorder, moderate  Insomnia, unspecified  Opiate use disorder, severe, in remission, on partial agonist maintenance therapy    PLAN   1. Will add Clonidine 0.1mg nightly for sleep. 30 tablets, for a 30 day supply, 0 refills. 2. Will discontinue Doxepin 25mg nightly for sleep  3. Will refill Wellbutrin XL 150mg daily. 30 tablets, for a 30 day supply with 1 refill. Follow up 2 weeks, sooner PRN    Electronically signed by Seth Cross PA-C on 10/20/2020 at 10:21 PM    More than 16 mins of the session was spent doing Supportive psychotherapy. Session lasted for 45 mins. Vincent Munoz is a 22 y.o. male being evaluated by a Virtual Visit (video visit) encounter to address concerns as mentioned above. A caregiver was present when appropriate.  Due to this being a TeleHealth encounter (During [facility]- public health emergency), evaluation of the following organ systems was limited: Vitals/Constitutional/EENT/Resp/CV/GI//MS/Neuro/Skin/Heme-Lymph-Imm. Pursuant to the emergency declaration under the [address] [place] [address], [address] [place] [address] and the [employer] and [employer] Act, this Virtual Visit was conducted with patient's (and/or legal guardian's) consent, to reduce the patient's risk of exposure to COVID-19 and provide necessary medical care. The patient (and/or legal guardian) has also been advised to contact this office for worsening conditions or problems, and seek emergency medical treatment and/or call 911 if deemed necessary. Patient identification was verified at the start of the visit: Yes    Total time spent for this encounter: 45 minutes    Services were provided through a video synchronous discussion virtually to substitute for in-person clinic visit. Patient and provider were located at their individual homes. --Anita Amin PA-C on 10/20/2020 at 10:27 PM    An electronic signature was used to authenticate this note.

## 2020-10-28 ENCOUNTER — OFFICE VISIT (OUTPATIENT)
Dept: INTERNAL MEDICINE CLINIC | Age: 25
End: 2020-10-28
Payer: MEDICARE

## 2020-10-28 VITALS
HEIGHT: 72 IN | WEIGHT: 173 LBS | DIASTOLIC BLOOD PRESSURE: 61 MMHG | HEART RATE: 97 BPM | SYSTOLIC BLOOD PRESSURE: 126 MMHG | BODY MASS INDEX: 23.43 KG/M2 | TEMPERATURE: 98.2 F

## 2020-10-28 PROCEDURE — G8427 DOCREV CUR MEDS BY ELIG CLIN: HCPCS | Performed by: INTERNAL MEDICINE

## 2020-10-28 PROCEDURE — G8420 CALC BMI NORM PARAMETERS: HCPCS | Performed by: INTERNAL MEDICINE

## 2020-10-28 PROCEDURE — G8484 FLU IMMUNIZE NO ADMIN: HCPCS | Performed by: INTERNAL MEDICINE

## 2020-10-28 PROCEDURE — 4004F PT TOBACCO SCREEN RCVD TLK: CPT | Performed by: INTERNAL MEDICINE

## 2020-10-28 PROCEDURE — 99213 OFFICE O/P EST LOW 20 MIN: CPT | Performed by: INTERNAL MEDICINE

## 2020-10-28 PROCEDURE — 80305 DRUG TEST PRSMV DIR OPT OBS: CPT | Performed by: INTERNAL MEDICINE

## 2020-10-28 RX ORDER — BUPRENORPHINE AND NALOXONE 8; 2 MG/1; MG/1
1 FILM, SOLUBLE BUCCAL; SUBLINGUAL 2 TIMES DAILY
Qty: 28 FILM | Refills: 0 | Status: SHIPPED | OUTPATIENT
Start: 2020-10-28 | End: 2020-11-11 | Stop reason: SDUPTHER

## 2020-10-28 NOTE — PROGRESS NOTES
Verbal order per Dr. Meka Saucedo for urine drug screen. Positive for BUP BZO THC. Verified results with Yesica Fortune RN. Dr. Meka Saucedo ordered Suboxone 8mg film BID for patient. Verified dose with patient. Patient was sent home with 2 week script of Suboxone 8mg film BID and will be seen back in the office 11/11/20.

## 2020-10-28 NOTE — PROGRESS NOTES
MEDICATION ASSISTED TREATMENT ENCOUNTER    HISTORY OF PRESENT ILLNESS  Patient presents for evaluation of opioid use and would like to be placed on medication assisted treatment  Patient has had problems using heroin  I saw him here  9/30    We did a video visit 10/14  He was sick in bed thinks he had Covid  His new girlfriend has    At the last video visit patient was in bed he was sick lost his sense of smell and has not come back he is convinced he had Covid although did not get tested    Patient started using heroin 3 years ago  He started on pain pills in 2016  He thought there were Percocet but they were pressed fentanyl  Patient uses it he began snorting it but now IV  Other drugs used: If he could not find heroin he would use anything he can get as he puts it to get out of himself  Suboxone programs in the past [facility] in [place]  He has been there a couple of years but he cannot afford it  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 ROS     General:Patient is feeling well  Patient is not experiencing  withdrawal symptoms ,no urges or cravings  Patient is not having any side effects from the buprenorphine        PHYSICAL EXAM  Blood pressure 126/61, pulse 97, temperature 98.2 °F (36.8 °C), height 6' (1.829 m), weight 173 lb (78.5 kg).          General: Patient resting comfortably in no acute distress     Mental status: Alert and oriented to person place and time, no hallucinations or delusions     Eyes: Pupils are normal      URINE DRUG SCREEN TODAY:  Recent Labs     10/28/20  0905   ALCOHOL NEG   LABAMPH NEG   LABBARB NEG   LABBENZ POS   BUPRENUR POS   COCAIMETSCRU NEG   FENTSCRUR NEG   GABAPENTIN N/A   MDMA NEG   METAMPU NEG   LABMETH NEG   OPIATESCREENURINE NEG   OXTCOSU NEG   PHENCYCLIDINESCREENURINE NEG   PROPOXYPHENE NEG   SPICEUR NEG   THCSCREENUR POS   TRAMADOLUR NEG   TRICYUR N/A        Diagnosis Orders   1. Severe opioid use disorder (HCC)  POCT Rapid Drug Screen    buprenorphine-naloxone (SUBOXONE) 8-2 MG FILM SL film   2. Polysubstance abuse ([facility] [address]     3. Encounter for monitoring Suboxone maintenance therapy     4. Anxiety     5.  Exposure to COVID-19 virus         PLAN:          He says he gets little bit down as the seasons change  I told him he has to stop using benzos or I will stop his Suboxone    We will see him back in 2 weeks  I reviewed the [place] Automated Rx Reporting System report     There does not appear to be any discrepancies or overprescribing of controlled substances

## 2020-10-30 ENCOUNTER — VIRTUAL VISIT (OUTPATIENT)
Dept: PSYCHIATRY | Age: 25
End: 2020-10-30
Payer: MEDICARE

## 2020-10-30 PROCEDURE — G8427 DOCREV CUR MEDS BY ELIG CLIN: HCPCS | Performed by: PHYSICIAN ASSISTANT

## 2020-10-30 PROCEDURE — 99213 OFFICE O/P EST LOW 20 MIN: CPT | Performed by: PHYSICIAN ASSISTANT

## 2020-10-30 PROCEDURE — 90833 PSYTX W PT W E/M 30 MIN: CPT | Performed by: PHYSICIAN ASSISTANT

## 2020-10-30 NOTE — PROGRESS NOTES
[facility]   Progress Note     Chief Complaint   Patient presents with    Anxiety    Follow-up    Insomnia          SUBJECTIVE:    Cathryn Marques is a 26 y.o. male who presents via telehealth video for a follow-up appointment for insomnia and anxiety. Was last seen on 10/15/2020.     Yuan Goodman reports he has less anxiety with the Clonidine but has not noticed much change in his sleep. He feels that taking Clonidine PRN \"keeps him grounded. \" He feels it has helped with his anxiety and panic attacks significantly. He has been trying to force himself to sleep. He puts himself to bed really early because he it takes so long for him to go to sleep. He still feels drowsy throughout the day. Has not been getting good sleep. He has been jittery when he lays in bed trying to go to sleep. He thinks his \"morale\" is less  He reports he has been more short and irritable with people lately because he has not been sleeping well. He reports his panic attacks have been down. He only has had one the past week. He takes the Clonidine when he gets home from work as needed and feels it has been working well for him. He states he has been having a lot of weird dreams lately. He states \"I wake up and Im in prison or the world is ending. \"  He interpreted his dream and thinks a big change is going to come in his life. He feels his depression has been \"good\" He feels his medications have been managing his depression. Ole Kayser keep myself going. \"   He reports his appetite has been good. He states he has been smoking weed occasionally to have an appetite. He feels his motivation has been decent. He states it is better when he gets a good night of sleep. He reports his energy is good once he gets going. He feels his attention and concentration is the same. He denies anhedonia. He states he has been feeling hopeless and helpless at times about his sleep. Denies recent suicidal ideation.    Denies symptoms of dontrell/hypomania.             OBJECTIVE  Level of consciousness:  Within normal limits  Appearance: Street clothes, seated in the car, with good grooming  Behavior/Motor: No abnormalities noted  Attitude toward examiner:  Cooperative, attentive, good eye contact  Speech:  Normal rate and volume   Mood:  Anxious   Affect:  mood congruent  Thought processes:  linear, goal directed and coherent  Thought content:  denies homicidal ideation  Suicidal Ideation:  denies suicidal ideation  Delusions:  no evidence of delusions  Perceptual Disturbance:  denies any perceptual disturbance  Cognition:  In tact  Memory: age appropriate  Insight & Judgement: fair  Medication side effects:  denies       Medications       Current Outpatient Medications:     doxyLAMINE succinate (GNP SLEEP AID) 25 MG tablet, Take 1 tablet by mouth nightly as needed for Sleep, Disp: 30 tablet, Rfl: 0    buprenorphine-naloxone (SUBOXONE) 8-2 MG FILM SL film, Place 1 Film under the tongue 2 times daily for 14 days. , Disp: 28 Film, Rfl: 0    cloNIDine (CATAPRES) 0.1 MG tablet, Take 1 tablet by mouth nightly, Disp: 30 tablet, Rfl: 0    buPROPion (WELLBUTRIN XL) 150 MG extended release tablet, Take 1 tablet by mouth every morning, Disp: 30 tablet, Rfl: 1    predniSONE (DELTASONE) 20 MG tablet, 40 mg/ day for week 1, 20 mg/ day for week 2, Disp: 21 tablet, Rfl: 0    ibuprofen (ADVIL;MOTRIN) 200 MG tablet, Take 400 mg by mouth every 6 hours as needed for Pain, Disp: , Rfl:        ASSESSMENT   Insomnia, unspecified  Generalized anxiety disorder  Recurrent major depressive disorder, moderate  Opiate use disorder, severe, in remission, on partial agonist maintenance therapy    PLAN   I reviewed the [place] Automated Rx Reporting System report. There does not appear to be any discrepancies or overprescribing of controlled substances  1. Will try Belsomra 5mg nightly for sleep. Will prescribe 15 tablets for a 15 day supply with 0 refills.    His insurance requires a PA. If his insurance does not cover it, we will try Doxylamine 25mg nightly PRN for sleep. If that does not work, will consider trying CBT and/or a controlled substance aside from [address] for sleep. Follow up in 2 weeks , sooner PRN    Electronically signed by [facility] AG[facility] on 11/3/2020 at 6:05 PM    More than 16 mins of the session was spent doing Supportive psychotherapy. Session lasted for 30 mins. Katie Helms is a 26 y.o. male being evaluated by a Virtual Visit (video visit) encounter to address concerns as mentioned above. A caregiver was present when appropriate. Due to this being a TeleHealth encounter (During [id number] public health emergency), evaluation of the following organ systems was limited: Vitals/Constitutional/EENT/Resp/CV/GI//MS/Neuro/Skin/Heme-Lymph-Imm. Pursuant to the emergency declaration under the [address] [place] [address] authority and the [employer] and [employer] Act, this Virtual Visit was conducted with patient's (and/or legal guardian's) consent, to reduce the patient's risk of exposure to COVID-19 and provide necessary medical care. The patient (and/or legal guardian) has also been advised to contact this office for worsening conditions or problems, and seek emergency medical treatment and/or call 911 if deemed necessary. Patient identification was verified at the start of the visit: Yes    Total time spent for this encounter: 30 minutes    Services were provided through a video synchronous discussion virtually to substitute for in-person clinic visit. Patient and provider were located at their individual homes. --[facility] on 11/3/2020 at 6:11 PM    An electronic signature was used to authenticate this note.

## 2020-11-02 ENCOUNTER — TELEPHONE (OUTPATIENT)
Dept: PSYCHIATRY | Age: 25
End: 2020-11-02

## 2020-11-03 NOTE — TELEPHONE ENCOUNTER
Please let Kumar Beau know that the medication I originally prescribed was not approved by his insurance and that I will send Doxylamine 25mg nightly PRN for sleep for a 30 day supply. Will send in 30 tablets with 0 refills. I will discuss other options further with him at our next appointment.

## 2020-11-03 NOTE — TELEPHONE ENCOUNTER
This writer spoke with patient and relayed provider's response. Patient agreeable with medication change. Patient wanted to verify that it was okay to take Clonidine as needed for anxiety with use of this sleep aid since patient was previously using Clonidine for sleep.

## 2020-11-11 ENCOUNTER — OFFICE VISIT (OUTPATIENT)
Dept: INTERNAL MEDICINE CLINIC | Age: 25
End: 2020-11-11
Payer: MEDICARE

## 2020-11-11 VITALS
TEMPERATURE: 98.1 F | SYSTOLIC BLOOD PRESSURE: 132 MMHG | DIASTOLIC BLOOD PRESSURE: 75 MMHG | HEART RATE: 76 BPM | BODY MASS INDEX: 23.3 KG/M2 | HEIGHT: 72 IN | WEIGHT: 172 LBS

## 2020-11-11 PROCEDURE — 99213 OFFICE O/P EST LOW 20 MIN: CPT | Performed by: INTERNAL MEDICINE

## 2020-11-11 PROCEDURE — G8484 FLU IMMUNIZE NO ADMIN: HCPCS | Performed by: INTERNAL MEDICINE

## 2020-11-11 PROCEDURE — 4004F PT TOBACCO SCREEN RCVD TLK: CPT | Performed by: INTERNAL MEDICINE

## 2020-11-11 PROCEDURE — G8420 CALC BMI NORM PARAMETERS: HCPCS | Performed by: INTERNAL MEDICINE

## 2020-11-11 PROCEDURE — G8427 DOCREV CUR MEDS BY ELIG CLIN: HCPCS | Performed by: INTERNAL MEDICINE

## 2020-11-11 PROCEDURE — 80305 DRUG TEST PRSMV DIR OPT OBS: CPT | Performed by: INTERNAL MEDICINE

## 2020-11-11 RX ORDER — BUPRENORPHINE AND NALOXONE 8; 2 MG/1; MG/1
1 FILM, SOLUBLE BUCCAL; SUBLINGUAL 2 TIMES DAILY
Qty: 28 FILM | Refills: 0 | Status: SHIPPED | OUTPATIENT
Start: 2020-11-11 | End: 2020-11-24 | Stop reason: SDUPTHER

## 2020-11-11 NOTE — PROGRESS NOTES
MEDICATION ASSISTED TREATMENT ENCOUNTER    HISTORY OF PRESENT ILLNESS  Patient presents for evaluation of opioid use and would like to be placed on medication assisted treatment  Patient has had problems using heroin  I saw him here  10/28  He said a couple days ago he used some old Neurontin that he had for pain in his left foot  We did a video visit 10/14  He was sick in bed thinks he had Covid  His new girlfriend has    At the last video visit patient was in bed he was sick lost his sense of smell and has not come back he is convinced he had Covid although did not get tested    Patient started using heroin 3 years ago  He started on pain pills in 2016    Patient uses it he began snorting it but now IV  Other drugs used: If he could not find heroin he would use anything he can get as he puts it to get out of himself  [facility] programs in the past [facility]  He has been there a couple of years but he cannot afford it  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 ROS     General:Patient is feeling well  Patient is not experiencing  withdrawal symptoms ,no urges or cravings  Patient is not having any side effects from the buprenorphine        PHYSICAL EXAM  Blood pressure 132/75, pulse 76, temperature 98.1 °F (36.7 °C), height 6' (1.829 m), weight 172 lb (78 kg).          General: Patient resting comfortably in no acute distress     Mental status: Alert and oriented to person place and time, no hallucinations or delusions     Eyes: Pupils are normal      URINE DRUG SCREEN TODAY:  Recent Labs     11/11/20  0810   ALCOHOL NEG   LABAMPH NEG   LABBARB NEG   LABBENZ NEG   BUPRENUR POS   COCAIMETSCRU NEG   FENTSCRUR NEG   GABAPENTIN N/A   MDMA NEG   METAMPU NEG   LABMETH NEG OPIATESCREENURINE NEG   OXTCOSU NEG   PHENCYCLIDINESCREENURINE NEG   PROPOXYPHENE N/A   SPICEUR NEG   THCSCREENUR POS   TRAMADOLUR NEG   TRICYUR N/A        Diagnosis Orders   1. Severe opioid use disorder (HCC)  buprenorphine-naloxone (SUBOXONE) 8-2 MG FILM SL film    POCT Rapid Drug Screen   2. Polysubstance abuse ([address]     3. Encounter for monitoring Suboxone maintenance therapy     4.  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

## 2020-11-11 NOTE — PROGRESS NOTES
Verbal order per Dr. Ady Diez for urine drug screen. Positive for BUP THC . Verified results with Alvarez Diez ordered Suboxone 8mg film BID  for patient. Verified dose with patient. Patient was sent home with 2 week script of Suboxone 8mg film BID and will be seen back in the office 11/25/20.

## 2020-11-24 ENCOUNTER — VIRTUAL VISIT (OUTPATIENT)
Dept: INTERNAL MEDICINE CLINIC | Age: 25
End: 2020-11-24
Payer: MEDICARE

## 2020-11-24 PROCEDURE — 4004F PT TOBACCO SCREEN RCVD TLK: CPT | Performed by: INTERNAL MEDICINE

## 2020-11-24 PROCEDURE — G8484 FLU IMMUNIZE NO ADMIN: HCPCS | Performed by: INTERNAL MEDICINE

## 2020-11-24 PROCEDURE — G8420 CALC BMI NORM PARAMETERS: HCPCS | Performed by: INTERNAL MEDICINE

## 2020-11-24 PROCEDURE — G8428 CUR MEDS NOT DOCUMENT: HCPCS | Performed by: INTERNAL MEDICINE

## 2020-11-24 PROCEDURE — 99213 OFFICE O/P EST LOW 20 MIN: CPT | Performed by: INTERNAL MEDICINE

## 2020-11-24 RX ORDER — BUPRENORPHINE AND NALOXONE 8; 2 MG/1; MG/1
1 FILM, SOLUBLE BUCCAL; SUBLINGUAL 2 TIMES DAILY
Qty: 28 FILM | Refills: 0 | Status: SHIPPED | OUTPATIENT
Start: 2020-11-24 | End: 2020-12-08 | Stop reason: SDUPTHER

## 2020-11-24 NOTE — PROGRESS NOTES
2020    TELEHEALTH EVALUATION -- Audio/Visual (During [facility]-05 public health emergency)    HPI:  A video conference was done with the patient  Patient was at home, I was in the office  There was no  one else  present during the conference    Garret Felicianomaykel (:  1995) has requested an audio/video evaluation for the following concern(s):    HPI  I saw him here    He said a couple days ago he used some old Neurontin that he had for pain in his left foot  We did a video visit 10/14  He was sick in bed thinks he had Covid  His new girlfriend has     At the last video visit patient was in bed he was sick lost his sense of smell and has not come back he is convinced he had Covid although did not get tested     Patient started using heroin 3 years ago  He started on pain pills in 2016     Patient uses it he began snorting it but now IV  Other drugs used: If he could not find heroin he would use anything he can get as he puts it to get out of himself  Sub[facility]one programs in the past [facility]  He has been there a couple of years but he cannot afford it  He got a new job at a [employer]  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 Authorizing Provider   buprenorphine-naloxone (SUBOXONE) 8-2 MG FILM SL film Place 1 Film under the tongue 2 times daily for 14 days.  Yes Dudley Rowland MD   doxyLAMINE succinate (GNP SLEEP AID) 25 MG tablet Take 1 tablet by mouth nightly as needed for Sleep  [employer], PA-C   cloNIDine (CATAPRES) 0.1 MG tablet Take 1 tablet by mouth nightly  [employer] Cheema   buPROPion (WELLBUTRIN XL) 150 MG extended release tablet Take 1 tablet by mouth every morning  [employer] Cheema   predniSONE (DELTASONE) 20 MG tablet 40 mg/ day for week 1, 20 mg/ day for week 2  Mercedez Condon JOHNATHAN Lamb - JARED   ibuprofen (ADVIL;MOTRIN) 200 MG tablet Take 400 mg by mouth every 6 hours as needed for Pain  Historical Provider, MD       Social History     Tobacco Use    Smoking status: Current Every Day Smoker     Packs/day: 0.50     Types: Cigarettes     Start date: 9/17/2014    Smokeless tobacco: Former User     Quit date: 9/17/2015   Substance Use Topics    Alcohol use: No    Drug use: Yes     Frequency: 7.0 times per week     Types: Marijuana, IV, Opiates      Comment: heroin snort and iv, LAST USED METH 10/16/2019            PHYSICAL EXAMINATION:   INSTRUCTIONS:  \"_\" Indicates a positive item  \"_\" Indicates a negative item  -- DELETE ALL ITEMS NOT EXAMINED  No vitals done    Constitutional: [x] Appears well-developed and well-nourished [x] No apparent distress      [] Abnormal-   Mental status  [x] Alert and awake  [x] Oriented to person/place/time [x]Able to follow commands      Eyes:  EOM    [x]  Normal  [] Abnormal-  Sclera  []  Normal  [] Abnormal -         Discharge []  None visible  [] Abnormal -  Pupils normal           Psychiatric:       [x] Normal Affect [] No Hallucinations        [] Abnormal-        Diagnosis Orders   1. Severe opioid use disorder (HCC)  buprenorphine-naloxone (SUBOXONE) 8-2 MG FILM SL film   2. Polysubstance abuse ([facility]     3. Encounter for monitoring Suboxone maintenance therapy     4. Romayne Spence is a 26 y.o. male being evaluated by a Virtual Visit (video visit) encounter to address concerns as mentioned above. A caregiver was present when appropriate. Due to this being a TeleHealth encounter (During [facility]- public health emergency), evaluation of the following organ systems was limited: Vitals/Constitutional/EENT/Resp/CV/GI//MS/Neuro/Skin/Heme-Lymph-Imm.   Pursuant to the emergency declaration under the [facility] waiver authority and the [employer] Appropriations Act, this Virtual Visit was conducted with patient's (and/or legal guardian's) consent, to reduce the patient's risk of exposure to COVID-19 and provide necessary medical care. The patient (and/or legal guardian) has also been advised to contact this office for worsening conditions or problems, and seek emergency medical treatment and/or call 911 if deemed necessary. Services were provided through a video synchronous discussion virtually to substitute for in-person clinic visit. Patient is doing well  Continue current Suboxone dose  Follow-up here in 2 weeks  I reviewed the [place] Automated Rx Reporting System report     There does not appear to be any discrepancies or overprescribing of controlled substances    --Chloe Castellon MD on 11/24/2020 at 12:17 PM    An electronic signature was used to authenticate this note.

## 2020-12-08 ENCOUNTER — OFFICE VISIT (OUTPATIENT)
Dept: INTERNAL MEDICINE CLINIC | Age: 25
End: 2020-12-08
Payer: MEDICARE

## 2020-12-08 VITALS
WEIGHT: 172 LBS | TEMPERATURE: 98.1 F | BODY MASS INDEX: 22.8 KG/M2 | HEIGHT: 73 IN | HEART RATE: 105 BPM | DIASTOLIC BLOOD PRESSURE: 70 MMHG | SYSTOLIC BLOOD PRESSURE: 138 MMHG

## 2020-12-08 PROCEDURE — G8420 CALC BMI NORM PARAMETERS: HCPCS | Performed by: INTERNAL MEDICINE

## 2020-12-08 PROCEDURE — 4004F PT TOBACCO SCREEN RCVD TLK: CPT | Performed by: INTERNAL MEDICINE

## 2020-12-08 PROCEDURE — 80305 DRUG TEST PRSMV DIR OPT OBS: CPT | Performed by: INTERNAL MEDICINE

## 2020-12-08 PROCEDURE — G8427 DOCREV CUR MEDS BY ELIG CLIN: HCPCS | Performed by: INTERNAL MEDICINE

## 2020-12-08 PROCEDURE — 99213 OFFICE O/P EST LOW 20 MIN: CPT | Performed by: INTERNAL MEDICINE

## 2020-12-08 PROCEDURE — G8484 FLU IMMUNIZE NO ADMIN: HCPCS | Performed by: INTERNAL MEDICINE

## 2020-12-08 RX ORDER — BUPRENORPHINE AND NALOXONE 8; 2 MG/1; MG/1
1 FILM, SOLUBLE BUCCAL; SUBLINGUAL 2 TIMES DAILY
Qty: 28 FILM | Refills: 0 | Status: SHIPPED | OUTPATIENT
Start: 2020-12-08 | End: 2020-12-22 | Stop reason: SDUPTHER

## 2020-12-08 NOTE — PROGRESS NOTES
MEDICATION ASSISTED TREATMENT ENCOUNTER    HISTORY OF PRESENT ILLNESS  Patient presents for evaluation of opioid use and would like to be placed on medication assisted treatment  Patient has had problems using heroin  I saw him here  11/11    He said a couple days ago he used some old Neurontin that he had for pain in his left foot  We did a video visit 11/24          Patient started using heroin 3 years ago  He started on pain pills in 2016    Patient uses it he began snorting it but now IV  Other drugs used: If he could not find heroin he would use anything he can get as he puts it to get out of himself  Suboxone programs in the past [facility] in [place]  He has been there a couple of years but he cannot afford it  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 ROS     General:Patient is feeling well  Patient is not experiencing  withdrawal symptoms ,no urges or cravings  Patient is not having any side effects from the buprenorphine        PHYSICAL EXAM  Blood pressure 138/70, pulse 105, temperature 98.1 °F (36.7 °C), height 6' 1\" (1.854 m), weight 172 lb (78 kg). General: Patient resting comfortably in no acute distress     Mental status: Alert and oriented to person place and time, no hallucinations or delusions     Eyes: Pupils are normal      URINE DRUG SCREEN TODAY:  No results for input(s): ALCOHOL, LABAMPH, LABBARB, LABBENZ, BUPRENUR, COCAIMETSCRU, FENTSCRUR, GABAPENTIN, MDMA, METAMPU, LABMETH, OPIATESCREENURINE, OXTCOSU, PHENCYCLIDINESCREENURINE, PROPOXYPHENE, SPICEUR, THCSCREENUR, Zaki Batter in the last 72 hours. Diagnosis Orders   1. Severe opioid use disorder (HCC)  POCT Rapid Drug Screen    buprenorphine-naloxone (SUBOXONE) 8-2 MG FILM SL film   2. Polysubstance abuse ([address]     3. Encounter for monitoring Suboxone maintenance therapy     4. Anxiety     5.  Exposure to COVID-19 virus         PLAN:          He is doing well  Continue current dose Suboxone    We will see him back in 2 weeks  I reviewed the [place] Automated Rx Reporting System report     There does not appear to be any discrepancies or overprescribing of controlled substances

## 2020-12-18 NOTE — TELEPHONE ENCOUNTER
Kumar Yanez called into the office stating that he needs a refill on a couple of his medications:     Clonidine 0.1mg;#30 with 0 refills and Wellbutrin XL 150mg;#30 with 1 refill;last with a start date of 10/15/20. Both medications are pending your approval for a 30 day supply with 0 refills; he does not have a future appt yet; he will be contacted once a future template is built.

## 2020-12-19 RX ORDER — CLONIDINE HYDROCHLORIDE 0.1 MG/1
0.1 TABLET ORAL NIGHTLY
Qty: 30 TABLET | Refills: 0 | Status: SHIPPED | OUTPATIENT
Start: 2020-12-19 | End: 2021-04-20 | Stop reason: SDUPTHER

## 2020-12-19 RX ORDER — BUPROPION HYDROCHLORIDE 150 MG/1
150 TABLET ORAL EVERY MORNING
Qty: 30 TABLET | Refills: 0 | Status: SHIPPED
Start: 2020-12-19 | End: 2021-06-03 | Stop reason: SDUPTHER

## 2020-12-19 RX ORDER — BUPROPION HYDROCHLORIDE 150 MG/1
150 TABLET ORAL EVERY MORNING
Qty: 30 TABLET | Refills: 3 | Status: SHIPPED | OUTPATIENT
Start: 2020-12-19 | End: 2021-04-20 | Stop reason: SDUPTHER

## 2020-12-19 RX ORDER — CLONIDINE HYDROCHLORIDE 0.1 MG/1
0.1 TABLET ORAL DAILY
Qty: 30 TABLET | Refills: 0 | Status: SHIPPED | OUTPATIENT
Start: 2020-12-19 | End: 2021-06-03 | Stop reason: SDUPTHER

## 2020-12-22 ENCOUNTER — OFFICE VISIT (OUTPATIENT)
Dept: INTERNAL MEDICINE CLINIC | Age: 25
End: 2020-12-22
Payer: MEDICARE

## 2020-12-22 VITALS
TEMPERATURE: 97.9 F | SYSTOLIC BLOOD PRESSURE: 132 MMHG | HEIGHT: 73 IN | DIASTOLIC BLOOD PRESSURE: 81 MMHG | BODY MASS INDEX: 20.81 KG/M2 | WEIGHT: 157 LBS | HEART RATE: 130 BPM

## 2020-12-22 PROCEDURE — 80305 DRUG TEST PRSMV DIR OPT OBS: CPT | Performed by: INTERNAL MEDICINE

## 2020-12-22 PROCEDURE — G8420 CALC BMI NORM PARAMETERS: HCPCS | Performed by: INTERNAL MEDICINE

## 2020-12-22 PROCEDURE — 4004F PT TOBACCO SCREEN RCVD TLK: CPT | Performed by: INTERNAL MEDICINE

## 2020-12-22 PROCEDURE — G8428 CUR MEDS NOT DOCUMENT: HCPCS | Performed by: INTERNAL MEDICINE

## 2020-12-22 PROCEDURE — G8484 FLU IMMUNIZE NO ADMIN: HCPCS | Performed by: INTERNAL MEDICINE

## 2020-12-22 PROCEDURE — 99213 OFFICE O/P EST LOW 20 MIN: CPT | Performed by: INTERNAL MEDICINE

## 2020-12-22 RX ORDER — BUPRENORPHINE AND NALOXONE 8; 2 MG/1; MG/1
1 FILM, SOLUBLE BUCCAL; SUBLINGUAL 2 TIMES DAILY
Qty: 28 FILM | Refills: 0 | OUTPATIENT
Start: 2020-12-22 | End: 2021-01-05 | Stop reason: SDUPTHER

## 2020-12-22 RX ORDER — BUPRENORPHINE AND NALOXONE 8; 2 MG/1; MG/1
1 FILM, SOLUBLE BUCCAL; SUBLINGUAL 2 TIMES DAILY
Qty: 28 FILM | Refills: 0 | Status: SHIPPED | OUTPATIENT
Start: 2020-12-22 | End: 2020-12-22

## 2020-12-22 NOTE — PROGRESS NOTES
Verbal order per Dr. Garcia for urine drug screen. Positive for BUP, THC. Verified results with Chika Sorenson LPN. Dr. Garcia ordered Suboxone 8 mg film BID for patient. Verified dose with patient. Patient was sent home with 2 week script for Suboxone 8 mg film BID and will be seen back in the office on 1/5/21. UC and oral swab sent.

## 2020-12-22 NOTE — PROGRESS NOTES
MEDICATION ASSISTED TREATMENT ENCOUNTER    HISTORY OF PRESENT ILLNESS  Patient presents for evaluation of opioid use and would like to be placed on medication assisted treatment  Patient has had problems using heroin  I saw him here  12/8    He said a couple days ago he used some old Neurontin that he had for pain in his left foot  He said a block fell on it  He said it hurts it is tingling            Patient started using heroin 3 years ago  He started on pain pills in 2016    Patient uses it he began snorting it but now IV  Other drugs used: If he could not find heroin he would use anything he can get as he puts it to get out of himself  Suboxone programs in the past [facility] in [place]  He has been there a couple of years but he cannot afford it  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 ROS     General:Patient is feeling well  Patient is not experiencing  withdrawal symptoms ,no urges or cravings  Patient is not having any side effects from the buprenorphine        PHYSICAL EXAM  Blood pressure 132/81, pulse 130, temperature 97.9 °F (36.6 °C), height 6' 1\" (1.854 m), weight 157 lb (71.2 kg). General: Patient resting comfortably in no acute distress     Mental status: Alert and oriented to person place and time, no hallucinations or delusions     Eyes: Pupils are normal      URINE DRUG SCREEN TODAY:  No results for input(s): ALCOHOL, LABAMPH, LABBARB, LABBENZ, BUPRENUR, COCAIMETSCRU, FENTSCRUR, GABAPENTIN, MDMA, METAMPU, LABMETH, OPIATESCREENURINE, OXTCOSU, PHENCYCLIDINESCREENURINE, PROPOXYPHENE, SPICEUR, THCSCREENUR, Simin Jose Guadalupe in the last 72 hours. Diagnosis Orders   1.  Severe opioid use disorder (HCC)  POCT Rapid Drug Screen    buprenorphine-naloxone (SUBOXONE) 8-2 MG FILM SL film    DISCONTINUED: buprenorphine-naloxone (SUBOXONE) 8-2 MG FILM SL film   2. Neuropathy  [facility] - Bradley, Rene Uriarte DPM, [address], [address]   3. Polysubstance abuse ([facility] [address]     4. Encounter for monitoring Suboxone maintenance therapy     5.  Anxiety         PLAN:          He is doing well  Continue current dose Suboxone    We will see him back in 2 weeks  I reviewed the [place] Automated Rx Reporting System report     There does not appear to be any discrepancies or overprescribing of controlled substances  May have nerve damage in his foot from trauma  Refer to podiatry

## 2020-12-22 NOTE — PROGRESS NOTES
Pt called requesting script be sent to [facility] due to the pharmacy at the hospital being closed for lunch. RN called script to [facility] and cancelled script at [facility].

## 2021-01-05 ENCOUNTER — OFFICE VISIT (OUTPATIENT)
Dept: INTERNAL MEDICINE CLINIC | Age: 26
End: 2021-01-05
Payer: MEDICARE

## 2021-01-05 ENCOUNTER — HOSPITAL ENCOUNTER (OUTPATIENT)
Age: 26
Discharge: HOME | End: 2021-01-05
Payer: MEDICARE

## 2021-01-05 VITALS
TEMPERATURE: 98.1 F | HEIGHT: 73 IN | BODY MASS INDEX: 22 KG/M2 | HEART RATE: 88 BPM | SYSTOLIC BLOOD PRESSURE: 133 MMHG | WEIGHT: 166 LBS | DIASTOLIC BLOOD PRESSURE: 73 MMHG

## 2021-01-05 DIAGNOSIS — F41.9 ANXIETY: ICD-10-CM

## 2021-01-05 DIAGNOSIS — F11.20 SEVERE OPIOID USE DISORDER (HCC): Primary | ICD-10-CM

## 2021-01-05 DIAGNOSIS — F19.10 POLYSUBSTANCE ABUSE (HCC): ICD-10-CM

## 2021-01-05 DIAGNOSIS — B34.9 VIRAL ILLNESS: ICD-10-CM

## 2021-01-05 DIAGNOSIS — Z20.822 EXPOSURE TO COVID-19 VIRUS: ICD-10-CM

## 2021-01-05 DIAGNOSIS — G62.9 NEUROPATHY: ICD-10-CM

## 2021-01-05 DIAGNOSIS — Z51.81 ENCOUNTER FOR MONITORING SUBOXONE MAINTENANCE THERAPY: ICD-10-CM

## 2021-01-05 DIAGNOSIS — Z79.899 ENCOUNTER FOR MONITORING SUBOXONE MAINTENANCE THERAPY: ICD-10-CM

## 2021-01-05 PROCEDURE — U0003 INFECTIOUS AGENT DETECTION BY NUCLEIC ACID (DNA OR RNA); SEVERE ACUTE RESPIRATORY SYNDROME CORONAVIRUS 2 (SARS-COV-2) (CORONAVIRUS DISEASE [COVID-19]), AMPLIFIED PROBE TECHNIQUE, MAKING USE OF HIGH THROUGHPUT TECHNOLOGIES AS DESCRIBED BY CMS-2020-01-R: HCPCS

## 2021-01-05 PROCEDURE — 80305 DRUG TEST PRSMV DIR OPT OBS: CPT | Performed by: INTERNAL MEDICINE

## 2021-01-05 PROCEDURE — 99213 OFFICE O/P EST LOW 20 MIN: CPT | Performed by: INTERNAL MEDICINE

## 2021-01-05 PROCEDURE — G8484 FLU IMMUNIZE NO ADMIN: HCPCS | Performed by: INTERNAL MEDICINE

## 2021-01-05 PROCEDURE — 4004F PT TOBACCO SCREEN RCVD TLK: CPT | Performed by: INTERNAL MEDICINE

## 2021-01-05 PROCEDURE — G8420 CALC BMI NORM PARAMETERS: HCPCS | Performed by: INTERNAL MEDICINE

## 2021-01-05 PROCEDURE — G8427 DOCREV CUR MEDS BY ELIG CLIN: HCPCS | Performed by: INTERNAL MEDICINE

## 2021-01-05 RX ORDER — BUPRENORPHINE AND NALOXONE 8; 2 MG/1; MG/1
1 FILM, SOLUBLE BUCCAL; SUBLINGUAL 2 TIMES DAILY
Qty: 14 FILM | Refills: 0 | Status: SHIPPED | OUTPATIENT
Start: 2021-01-05 | End: 2021-01-12 | Stop reason: SDUPTHER

## 2021-01-05 NOTE — PROGRESS NOTES
Verbal order per Dr. Doris Mccoy for urine drug screen. Positive for BUP BZO THC Alcohol. Verified results with Rachel Smith RN. Dr. Doris Mccoy ordered Suboxone 8mg film BID for patient. Verified dose with patient. Patient was sent home with 1 week script of Suboxone 8mg film BID and will be seen back in the office 1/12/21. Ordered Covid test.    UC sent.

## 2021-01-05 NOTE — PROGRESS NOTES
Bayron Nolen (:  1995) is a 26 y.o. male,Established patient, here for evaluation of the following chief complaint(s):  Drug Problem      ASSESSMENT/PLAN:  1. Severe opioid use disorder (HCC)  -     POCT Rapid Drug Screen  2. Viral illness  -     COVID-19 Ambulatory; Future  3. Encounter for monitoring Suboxone maintenance therapy  4. Neuropathy  5. Polysubstance abuse ([facility] [address]  6. Anxiety  7. Exposure to COVID-19 virus    Urine has benzos  He is not prescribed them  I told him if he keeps using other substances I will stop his Suboxone  He is requesting a Covid test he said he was exposed to it  Follow-up 1 week  SUBJECTIVE/OBJECTIVE:  HPI  Patient has had problems using heroin  I saw him here    Oral swab from that visit showed methamphetamines  Urine showed Neurontin  He admits to doing both  He says the Neurontin helps pain in his feet and hands  He has not done anything since  He is seeing a podiatrist                 Patient started using heroin 3 years ago  He started on pain pills in 2016     Patient uses it he began snorting it but now IV  Other drugs used: If he could not find heroin he would use anything he can get as he puts it to get out of himself  Suboxone programs in the past [facility] in [place]  He has been there a couple of years but he cannot afford it  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 Neurological:      Mental Status: He is alert. Psychiatric:         Mood and Affect: Mood normal.         Behavior: Behavior normal.         Thought Content:  Thought content normal.         Urine tox screen today  Alcohol, Urine 2021  9:45 AM Unknown   POS    Amphetamine Screen, Urine 2021  9:45 AM Unknown   NEG    Barbiturate Screen, Urine 2021  9:45 AM Unknown   NEG    Benzodiazepine

## 2021-01-06 LAB — SARS-COV-2: NOT DETECTED

## 2021-01-07 ENCOUNTER — TELEPHONE (OUTPATIENT)
Dept: INTERNAL MEDICINE CLINIC | Age: 26
End: 2021-01-07

## 2021-01-12 ENCOUNTER — OFFICE VISIT (OUTPATIENT)
Dept: INTERNAL MEDICINE CLINIC | Age: 26
End: 2021-01-12
Payer: MEDICARE

## 2021-01-12 VITALS
WEIGHT: 166 LBS | HEIGHT: 73 IN | SYSTOLIC BLOOD PRESSURE: 134 MMHG | BODY MASS INDEX: 22 KG/M2 | HEART RATE: 89 BPM | DIASTOLIC BLOOD PRESSURE: 84 MMHG

## 2021-01-12 DIAGNOSIS — F19.10 POLYSUBSTANCE ABUSE (HCC): ICD-10-CM

## 2021-01-12 DIAGNOSIS — F11.20 SEVERE OPIOID USE DISORDER (HCC): Primary | ICD-10-CM

## 2021-01-12 DIAGNOSIS — G62.9 NEUROPATHY: ICD-10-CM

## 2021-01-12 DIAGNOSIS — Z79.899 ENCOUNTER FOR MONITORING SUBOXONE MAINTENANCE THERAPY: ICD-10-CM

## 2021-01-12 DIAGNOSIS — Z51.81 ENCOUNTER FOR MONITORING SUBOXONE MAINTENANCE THERAPY: ICD-10-CM

## 2021-01-12 PROCEDURE — 80305 DRUG TEST PRSMV DIR OPT OBS: CPT | Performed by: INTERNAL MEDICINE

## 2021-01-12 PROCEDURE — G8420 CALC BMI NORM PARAMETERS: HCPCS | Performed by: INTERNAL MEDICINE

## 2021-01-12 PROCEDURE — 4004F PT TOBACCO SCREEN RCVD TLK: CPT | Performed by: INTERNAL MEDICINE

## 2021-01-12 PROCEDURE — G8427 DOCREV CUR MEDS BY ELIG CLIN: HCPCS | Performed by: INTERNAL MEDICINE

## 2021-01-12 PROCEDURE — G8484 FLU IMMUNIZE NO ADMIN: HCPCS | Performed by: INTERNAL MEDICINE

## 2021-01-12 PROCEDURE — 99213 OFFICE O/P EST LOW 20 MIN: CPT | Performed by: INTERNAL MEDICINE

## 2021-01-12 RX ORDER — BUPRENORPHINE AND NALOXONE 8; 2 MG/1; MG/1
1 FILM, SOLUBLE BUCCAL; SUBLINGUAL 2 TIMES DAILY
Qty: 16 FILM | Refills: 0 | Status: SHIPPED | OUTPATIENT
Start: 2021-01-12 | End: 2021-01-20

## 2021-01-12 NOTE — PROGRESS NOTES
Verbal order per Dr. Marvin Mai for urine drug screen. Positive for BUP THC Alcohol. Verified results with Nayla Santacruz RN. Dr. Marvin Mai ordered Suboxone 8mg film BID  for patient. Verified dose with patient. Patient was sent home with 8 day script of Suboxone 8mg film BID and will be seen back in the office 1/20/21.

## 2021-01-12 NOTE — PROGRESS NOTES
Blayne Ray (:  1995) is a 26 y.o. male,Established patient, here for evaluation of the following chief complaint(s):  Drug Problem      ASSESSMENT/PLAN:  1. Severe opioid use disorder (HCC)  -     POCT Rapid Drug Screen  -     buprenorphine-naloxone (SUBOXONE) 8-2 MG FILM SL film; Place 1 Film under the tongue 2 times daily for 8 days. , Disp-16 Film, R-0Normal  2. Encounter for monitoring Suboxone maintenance therapy  3. Polysubstance abuse ([facility] [address]  4. Neuropathy      Comprehensive urine last visit showed Xanax, gabapentin, THC, alcohol  We will repeat  comprehensive urine today  Tentatively follow-up 8 days  Continue Suboxone 8 mg twice daily  SUBJECTIVE/OBJECTIVE:  HPI  I saw him here    Oral swab from that visit showed methamphetamines  Urine showed Neurontin  He admits to doing both he says he has not had any Neurontin in 3 days  He says the Neurontin helps pain in his feet and hands  He has not done anything since  He is seeing a podiatrist in a month                 Patient started using heroin 3 years ago  He started on pain pills in 2016     Patient uses it he began snorting it but now IV  Other drugs used: If he could not find heroin he would use anything he can get as he puts it to get out of himself  Suboxone programs in the past [facility]  He has been there a couple of years but he cannot afford it  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 He is normal weight. Eyes:      Pupils: Pupils are equal, round, and reactive to light. Neurological:      Mental Status: He is alert.    Psychiatric:         Mood and Affect: Mood normal.         Behavior: Behavior normal.         Thought Content: Thought content normal.         Judgment: Judgment normal.     Urine tox screen today  Alcohol, Urine 01/12/2021  9:40 AM Unknown   POS    Amphetamine Screen, Urine 01/12/2021  9:40 AM Unknown   NEG    Barbiturate Screen, Urine 01/12/2021  9:40 AM Unknown   NEG    Benzodiazepine Screen, Urine 01/12/2021  9:40 AM Unknown   NEG    Buprenorphine Urine 01/12/2021  9:40 AM Unknown   POS    Cocaine Metabolite Screen, Urine 01/12/2021  9:40 AM Unknown   NEG    FENTANYL SCREEN, URINE 01/12/2021  9:40 AM Unknown   NEG    Gabapentin Screen, Urine 01/12/2021  9:40 AM Unknown   N/A    MDMA, Urine 01/12/2021  9:40 AM Unknown   NEG    Methadone Screen, Urine 01/12/2021  9:40 AM Unknown   NEG    Methamphetamine, Urine 01/12/2021  9:40 AM Unknown   NEG    Opiate Scrn, Ur 01/12/2021  9:40 AM Unknown   NEG    Oxycodone Screen, Ur 01/12/2021  9:40 AM Unknown   NEG    PCP Screen, Urine 01/12/2021  9:40 AM Unknown   NEG    Propoxyphene Screen, Urine 01/12/2021  9:40 AM Unknown   N/A    Synthetic Cannabinoids (K2) Screen, Urine 01/12/2021  9:40 AM Unknown   NEG    THC Screen, Urine 01/12/2021  9:40 AM Unknown   POS    Tramadol Scrn, Ur 01/12/2021  9:40 AM Unknown   NEG    Tricyclic Antidepressants, Urine 01/12/2021  9:40 AM Unknown   N/A      I reviewed the [place] Automated Rx Reporting System report     There does not appear to be any discrepancies or overprescribing of controlled substances              An electronic signature was used to authenticate this note.     --Theo Morse MD

## 2021-01-21 ENCOUNTER — VIRTUAL VISIT (OUTPATIENT)
Dept: INTERNAL MEDICINE CLINIC | Age: 26
End: 2021-01-21
Payer: MEDICARE

## 2021-01-21 DIAGNOSIS — F19.10 POLYSUBSTANCE ABUSE (HCC): ICD-10-CM

## 2021-01-21 DIAGNOSIS — Z79.899 ENCOUNTER FOR MONITORING SUBOXONE MAINTENANCE THERAPY: ICD-10-CM

## 2021-01-21 DIAGNOSIS — Z51.81 ENCOUNTER FOR MONITORING SUBOXONE MAINTENANCE THERAPY: ICD-10-CM

## 2021-01-21 DIAGNOSIS — G62.9 NEUROPATHY: ICD-10-CM

## 2021-01-21 DIAGNOSIS — F11.20 SEVERE OPIOID USE DISORDER (HCC): Primary | ICD-10-CM

## 2021-01-21 PROCEDURE — G8484 FLU IMMUNIZE NO ADMIN: HCPCS | Performed by: INTERNAL MEDICINE

## 2021-01-21 PROCEDURE — G8420 CALC BMI NORM PARAMETERS: HCPCS | Performed by: INTERNAL MEDICINE

## 2021-01-21 PROCEDURE — 4004F PT TOBACCO SCREEN RCVD TLK: CPT | Performed by: INTERNAL MEDICINE

## 2021-01-21 PROCEDURE — 99213 OFFICE O/P EST LOW 20 MIN: CPT | Performed by: INTERNAL MEDICINE

## 2021-01-21 PROCEDURE — G8428 CUR MEDS NOT DOCUMENT: HCPCS | Performed by: INTERNAL MEDICINE

## 2021-01-21 RX ORDER — BUPRENORPHINE AND NALOXONE 8; 2 MG/1; MG/1
1 FILM, SOLUBLE BUCCAL; SUBLINGUAL 2 TIMES DAILY
COMMUNITY
End: 2021-01-21 | Stop reason: SDUPTHER

## 2021-01-21 RX ORDER — BUPRENORPHINE AND NALOXONE 8; 2 MG/1; MG/1
1 FILM, SOLUBLE BUCCAL; SUBLINGUAL 2 TIMES DAILY
Qty: 10 FILM | Refills: 0 | Status: SHIPPED | OUTPATIENT
Start: 2021-01-21 | End: 2021-01-25 | Stop reason: SDUPTHER

## 2021-01-21 NOTE — PROGRESS NOTES
2021    TELEHEALTH EVALUATION -- Audio/Visual (During SCOFX-36 public health emergency)    HPI:  A video conference was done with the patient  Patient was in his car, I was in the office  There was no  one else  present during the conference    Deisy Glass (:  1995) has requested an audio/video evaluation for the following concern(s):    HPI  I saw him here    Oral swab from that visit showed methamphetamines  Urine showed Neurontin  He admits to doing both he says he has not had any Neurontin in 3 days  He says the Neurontin helps pain in his feet and hands  He has not done anything since  He is seeing a podiatrist in a month     We made him a video visit he says his grandmother is sick in [place]  She had a psychotic episode where she cut herself  She went to [place] [id number] and then transferred to [facility] in [place]  Apparently she has Covid         Patient started using heroin 3 years ago  He started on pain pills in 2016     Patient uses it he began snorting it but now IV  Other drugs used: If he could not find heroin he would use anything he can get as he puts it to get out of himself  Suboxone programs in the past [facility] in [place]  He had been there a couple of years but he cannot afford it  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  Authorizing Provider buprenorphine-naloxone (SUBOXONE) 8-2 MG FILM SL film Place 1 Film under the tongue 2 times daily for 5 days.  Yes Cam Roberts MD   cloNIDine (CATAPRES) 0.1 MG tablet Take 1 tablet by mouth nightly  Louis Cheema PA-C   buPROPion (WELLBUTRIN XL) 150 MG extended release tablet Take 1 tablet by mouth every morning  Louis Cheema PA-C   buPROPion (WELLBUTRIN XL) 150 MG extended release tablet Take 1 tablet by mouth every morning  Cam Roberts MD   cloNIDine (CATAPRES) 0.1 MG tablet Take 1 tablet by mouth daily  Cam Roberts MD   doxyLAMINE succinate (GNP SLEEP AID) 25 MG tablet Take 1 tablet by mouth nightly as needed for Sleep  Louis Cheema PA-C   predniSONE (DELTASONE) 20 MG tablet 40 mg/ day for week 1, 20 mg/ day for week 2  JOHNATHAN Denney NP   ibuprofen (ADVIL;MOTRIN) 200 MG tablet Take 400 mg by mouth every 6 hours as needed for Pain  Historical Provider, MD       Social History     Tobacco Use    Smoking status: Current Every Day Smoker     Packs/day: 0.50     Types: Cigarettes     Start date: 9/17/2014    Smokeless tobacco: Former User     Quit date: 9/17/2015   Substance Use Topics    Alcohol use: No    Drug use: Yes     Frequency: 7.0 times per week     Types: Marijuana, IV, Opiates      Comment: heroin snort and iv, LAST USED METH 10/16/2019            PHYSICAL EXAMINATION:  [ INSTRUCTIONS:  \"[x]\" Indicates a positive item  \"[]\" Indicates a negative item  -- DELETE ALL ITEMS NOT EXAMINED]  No vitals done    Constitutional: [x] Appears well-developed and well-nourished [x] No apparent distress he is crying    [] Abnormal-   Mental status  [x] Alert and awake  [x] Oriented to person/place/time [x]Able to follow commands      Eyes:  EOM    [x]  Normal  [] Abnormal-  Sclera  []  Normal  [] Abnormal -         Discharge []  None visible  [] Abnormal -  Pupils normal           Psychiatric:       [x] Normal Affect [] No Hallucinations        [] Abnormal- Diagnosis Orders   1. Severe opioid use disorder (HCC)  buprenorphine-naloxone (SUBOXONE) 8-2 MG FILM SL film   2. Encounter for monitoring Suboxone maintenance therapy     3. Neuropathy     4. Polysubstance abuse ([facility]           Darinel Alexandra is a 26 y.o. male being evaluated by a Virtual Visit (video visit) encounter to address concerns as mentioned above. A caregiver was present when appropriate. Due to this being a TeleHealth encounter (During [facility] public health emergency), evaluation of the following organ systems was limited: Vitals/Constitutional/EENT/Resp/CV/GI//MS/Neuro/Skin/Heme-Lymph-Imm. Pursuant to the emergency declaration under the [address] [place] [address], [address] [place] [address] authority and the [employer] Act, this Virtual Visit was conducted with patient's (and/or legal guardian's) consent, to reduce the patient's risk of exposure to COVID-19 and provide necessary medical care. The patient (and/or legal guardian) has also been advised to contact this office for worsening conditions or problems, and seek emergency medical treatment and/or call 911 if deemed necessary. Services were provided through a video synchronous discussion virtually to substitute for in-person clinic visit. Continue current dose Suboxone  I am going to see him back here on Monday 1/25  --Cam Roberts MD on 1/21/2021 at 3:06 PM    An electronic signature was used to authenticate this note.

## 2021-01-25 ENCOUNTER — OFFICE VISIT (OUTPATIENT)
Dept: INTERNAL MEDICINE CLINIC | Age: 26
End: 2021-01-25
Payer: MEDICARE

## 2021-01-25 ENCOUNTER — TELEPHONE (OUTPATIENT)
Dept: INTERNAL MEDICINE CLINIC | Age: 26
End: 2021-01-25

## 2021-01-25 VITALS
HEART RATE: 117 BPM | SYSTOLIC BLOOD PRESSURE: 122 MMHG | BODY MASS INDEX: 22 KG/M2 | HEIGHT: 73 IN | WEIGHT: 166 LBS | DIASTOLIC BLOOD PRESSURE: 69 MMHG

## 2021-01-25 DIAGNOSIS — Z79.899 ENCOUNTER FOR MONITORING SUBOXONE MAINTENANCE THERAPY: ICD-10-CM

## 2021-01-25 DIAGNOSIS — F11.20 SEVERE OPIOID USE DISORDER (HCC): Primary | ICD-10-CM

## 2021-01-25 DIAGNOSIS — G62.9 NEUROPATHY: ICD-10-CM

## 2021-01-25 DIAGNOSIS — F19.10 POLYSUBSTANCE ABUSE (HCC): ICD-10-CM

## 2021-01-25 DIAGNOSIS — Z51.81 ENCOUNTER FOR MONITORING SUBOXONE MAINTENANCE THERAPY: ICD-10-CM

## 2021-01-25 PROCEDURE — 99213 OFFICE O/P EST LOW 20 MIN: CPT | Performed by: INTERNAL MEDICINE

## 2021-01-25 PROCEDURE — G8427 DOCREV CUR MEDS BY ELIG CLIN: HCPCS | Performed by: INTERNAL MEDICINE

## 2021-01-25 PROCEDURE — 4004F PT TOBACCO SCREEN RCVD TLK: CPT | Performed by: INTERNAL MEDICINE

## 2021-01-25 PROCEDURE — 80305 DRUG TEST PRSMV DIR OPT OBS: CPT | Performed by: INTERNAL MEDICINE

## 2021-01-25 PROCEDURE — G8484 FLU IMMUNIZE NO ADMIN: HCPCS | Performed by: INTERNAL MEDICINE

## 2021-01-25 PROCEDURE — G8420 CALC BMI NORM PARAMETERS: HCPCS | Performed by: INTERNAL MEDICINE

## 2021-01-25 RX ORDER — BUPRENORPHINE AND NALOXONE 8; 2 MG/1; MG/1
1 FILM, SOLUBLE BUCCAL; SUBLINGUAL 2 TIMES DAILY
Qty: 42 FILM | Refills: 0 | Status: SHIPPED | OUTPATIENT
Start: 2021-01-25 | End: 2021-03-02 | Stop reason: SDUPTHER

## 2021-01-25 NOTE — PROGRESS NOTES
Halima Bolden (:  1995) is a 26 y.o. male,Established patient, here for evaluation of the following chief complaint(s):  Drug Problem      ASSESSMENT/PLAN:  1. Severe opioid use disorder (HCC)  -     POCT Rapid Drug Screen  -     buprenorphine-naloxone (SUBOXONE) 8-2 MG FILM SL film; Place 1 Film under the tongue 2 times daily for 21 days. , Disp-42 Film, R-0Normal  2. Encounter for monitoring Suboxone maintenance therapy  3. Polysubstance abuse ([facility]  4. Neuropathy      Urine today has buprenorphine and THC  He does appear high  Follow-up 3 weeks  Continue Suboxone 8 mg twice daily  SUBJECTIVE/OBJECTIVE:  HPI  I saw him here    We did a video visit   Urine from that visit showed gabapentin and Xanax    He admits to doing both he says he has not had any Neurontin in 3 days  He says the Neurontin helps pain in his feet and hands  He has not done anything since  He is seeing a podiatrist in a month                 Patient started using heroin 3 years ago  He started on pain pills in 2016     Patient uses it he began snorting it but now IV  Other drugs used: If he could not find heroin he would use anything he can get as he puts it to get out of himself  Suboxone programs in the past [facility] in [facility]  He has been there a couple of years but he cannot afford it  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 Physical Exam  Constitutional:       Appearance: Normal appearance. He is normal weight. Eyes:      Pupils: Pupils are equal, round, and reactive to light. Neurological:      Mental Status: He is alert. Psychiatric:         Mood and Affect: Mood normal.         Behavior: Behavior normal.         Thought Content: Thought content normal.         Judgment: Judgment normal.     Urine tox screen today  Alcohol, Urine 01/25/2021  2:15 PM Unknown   NEG    Amphetamine Screen, Urine 01/25/2021  2:15 PM Unknown   NEG    Barbiturate Screen, Urine 01/25/2021  2:15 PM Unknown   NEG    Benzodiazepine Screen, Urine 01/25/2021  2:15 PM Unknown   NEG    Buprenorphine Urine 01/25/2021  2:15 PM Unknown   POS    Cocaine Metabolite Screen, Urine 01/25/2021  2:15 PM Unknown   NEG    FENTANYL SCREEN, URINE 01/25/2021  2:15 PM Unknown   NEG    Gabapentin Screen, Urine 01/25/2021  2:15 PM Unknown   N/A    MDMA, Urine 01/25/2021  2:15 PM Unknown   NEG    Methadone Screen, Urine 01/25/2021  2:15 PM Unknown   NEG    Methamphetamine, Urine 01/25/2021  2:15 PM Unknown   NEG    Opiate Scrn, Ur 01/25/2021  2:15 PM Unknown   NEG    Oxycodone Screen, Ur 01/25/2021  2:15 PM Unknown   NEG    PCP Screen, Urine 01/25/2021  2:15 PM Unknown   NEG    Propoxyphene Screen, Urine 01/25/2021  2:15 PM Unknown   N/A    Synthetic Cannabinoids (K2) Screen, Urine 01/25/2021  2:15 PM Unknown   NEG    THC Screen, Urine 01/25/2021  2:15 PM Unknown   POS    Tramadol Scrn, Ur 01/25/2021  2:15 PM Unknown   NEG    Tricyclic Antidepressants, Urine 01/25/2021  2:15 PM Unknown   N/A        I reviewed the [place] Automated Rx Reporting System report     There does not appear to be any discrepancies or overprescribing of controlled substances              An electronic signature was used to authenticate this note.     --Yusef Sandhu MD

## 2021-02-15 RX ORDER — BUPRENORPHINE AND NALOXONE 8; 2 MG/1; MG/1
1 FILM, SOLUBLE BUCCAL; SUBLINGUAL 2 TIMES DAILY
Qty: 42 FILM | Refills: 0 | Status: CANCELLED | OUTPATIENT
Start: 2021-02-15 | End: 2021-03-08

## 2021-02-16 ENCOUNTER — OFFICE VISIT (OUTPATIENT)
Dept: INTERNAL MEDICINE CLINIC | Age: 26
End: 2021-02-16
Payer: MEDICARE

## 2021-02-16 DIAGNOSIS — F19.10 POLYSUBSTANCE ABUSE (HCC): ICD-10-CM

## 2021-02-16 DIAGNOSIS — F11.20 SEVERE OPIOID USE DISORDER (HCC): Primary | ICD-10-CM

## 2021-02-16 DIAGNOSIS — F41.9 ANXIETY: ICD-10-CM

## 2021-02-16 DIAGNOSIS — Z51.81 ENCOUNTER FOR MONITORING SUBOXONE MAINTENANCE THERAPY: ICD-10-CM

## 2021-02-16 DIAGNOSIS — Z79.899 ENCOUNTER FOR MONITORING SUBOXONE MAINTENANCE THERAPY: ICD-10-CM

## 2021-02-16 DIAGNOSIS — F32.A DEPRESSION, UNSPECIFIED DEPRESSION TYPE: ICD-10-CM

## 2021-02-16 PROCEDURE — G8428 CUR MEDS NOT DOCUMENT: HCPCS | Performed by: INTERNAL MEDICINE

## 2021-02-16 PROCEDURE — G8484 FLU IMMUNIZE NO ADMIN: HCPCS | Performed by: INTERNAL MEDICINE

## 2021-02-16 PROCEDURE — G8420 CALC BMI NORM PARAMETERS: HCPCS | Performed by: INTERNAL MEDICINE

## 2021-02-16 PROCEDURE — 99213 OFFICE O/P EST LOW 20 MIN: CPT | Performed by: INTERNAL MEDICINE

## 2021-02-16 PROCEDURE — 4004F PT TOBACCO SCREEN RCVD TLK: CPT | Performed by: INTERNAL MEDICINE

## 2021-02-21 NOTE — PROGRESS NOTES
2021    TELEHEALTH EVALUATION -- Audio/Visual (During NDFNS-87 public health emergency)    HPI:  A video conference was done with the patient  Patient was at home, I was at home  There was no  one else  present during the conference    Elnor Bence (:  1995) has requested an audio/video evaluation for the following concern(s):    HPI  I saw him here    We did a video visit   Urine from that visit showed gabapentin and Xanax     He admits to doing both he says he has not had any Neurontin in 3 days  He says the Neurontin helps pain in his feet and hands  He has not done anything since  He is seeing a podiatrist in a month                 Patient started using heroin 3 years ago  He started on pain pills in 2016     Patient uses it he began snorting it but now IV  Other drugs used: If he could not find heroin he would use anything he can get as he puts it to get out of himself  Suboxone programs in the past [facility]  He has been there a couple of years but he cannot afford it  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  Authorizing Provider   cloNIDine (CATAPRES) 0.1 MG tablet Take 1 tablet by mouth nightly  [employer], PA-C   buPROPion (WELLBUTRIN XL) 150 MG extended release tablet Take 1 tablet by mouth every morning  [employer] PAUmaC   buPROPion (WELLBUTRIN XL) 150 MG extended release tablet Take 1 tablet by mouth every morning  Nora Yadav MD   cloNIDine (CATAPRES) 0.1 MG tablet Take 1 tablet by mouth daily  Nora Yadav MD   doxyLAMINE succinate (GNP SLEEP AID) 25 MG tablet Take 1 tablet by mouth nightly as needed for Sleep  [employer] Anette PAUmaC   predniSONE (DELTASONE) 20 MG tablet 40 mg/ day for week 1, 20 mg/ day for week 2  JOHNATHAN Aponte NP   ibuprofen (ADVIL;MOTRIN) 200 MG tablet Take 400 mg by mouth every 6 hours as needed for Pain  Historical Provider, MD       Social History     Tobacco Use    Smoking status: Current Every Day Smoker     Packs/day: 0.50     Types: Cigarettes     Start date: 9/17/2014    Smokeless tobacco: Former User     Quit date: 9/17/2015   Substance Use Topics    Alcohol use: No    Drug use: Yes     Frequency: 7.0 times per week     Types: Marijuana, IV, Opiates      Comment: heroin snort and iv, LAST USED METH 10/16/2019            PHYSICAL EXAMINATION:  [ INSTRUCTIONS:  \"[x]\" Indicates a positive item  \"[]\" Indicates a negative item  -- DELETE ALL ITEMS NOT EXAMINED]  No vitals done    Constitutional: [x] Appears well-developed and well-nourished [x] No apparent distress      [] Abnormal-   Mental status  [x] Alert and awake  [x] Oriented to person/place/time [x]Able to follow commands      Eyes:  EOM    [x]  Normal  [] Abnormal-  Sclera  []  Normal  [] Abnormal -         Discharge []  None visible  [] Abnormal -  Pupils normal           Psychiatric:       [x] Normal Affect [] No Hallucinations        [] Abnormal-        Diagnosis Orders   1. [facility]     2. Encounter for monitoring Suboxone maintenance therapy     3. Polysubstance abuse ([facility]     4. Anxiety     5. Depression, unspecified depression type           Gabriela Lo is a 26 y.o. male being evaluated by a Virtual Visit (video visit) encounter to address concerns as mentioned above. A caregiver was present when appropriate. Due to this being a TeleHealth encounter ([facility]), evaluation of the following organ systems was limited: Vitals/Constitutional/EENT/Resp/CV/GI//MS/Neuro/Skin/Heme-Lymph-Imm.   Pursuant to the emergency declaration under the [facility] waiver authority and the

## 2021-03-02 ENCOUNTER — OFFICE VISIT (OUTPATIENT)
Dept: INTERNAL MEDICINE CLINIC | Age: 26
End: 2021-03-02
Payer: MEDICARE

## 2021-03-02 VITALS
WEIGHT: 166 LBS | SYSTOLIC BLOOD PRESSURE: 137 MMHG | DIASTOLIC BLOOD PRESSURE: 84 MMHG | BODY MASS INDEX: 22 KG/M2 | HEART RATE: 106 BPM | HEIGHT: 73 IN | TEMPERATURE: 98.1 F

## 2021-03-02 DIAGNOSIS — F19.10 POLYSUBSTANCE ABUSE (HCC): ICD-10-CM

## 2021-03-02 DIAGNOSIS — Z51.81 ENCOUNTER FOR MONITORING SUBOXONE MAINTENANCE THERAPY: ICD-10-CM

## 2021-03-02 DIAGNOSIS — F11.20 SEVERE OPIOID USE DISORDER (HCC): Primary | ICD-10-CM

## 2021-03-02 DIAGNOSIS — Z79.899 ENCOUNTER FOR MONITORING SUBOXONE MAINTENANCE THERAPY: ICD-10-CM

## 2021-03-02 DIAGNOSIS — F41.9 ANXIETY: ICD-10-CM

## 2021-03-02 PROCEDURE — G8484 FLU IMMUNIZE NO ADMIN: HCPCS | Performed by: INTERNAL MEDICINE

## 2021-03-02 PROCEDURE — G8420 CALC BMI NORM PARAMETERS: HCPCS | Performed by: INTERNAL MEDICINE

## 2021-03-02 PROCEDURE — 4004F PT TOBACCO SCREEN RCVD TLK: CPT | Performed by: INTERNAL MEDICINE

## 2021-03-02 PROCEDURE — 80305 DRUG TEST PRSMV DIR OPT OBS: CPT | Performed by: INTERNAL MEDICINE

## 2021-03-02 PROCEDURE — 99213 OFFICE O/P EST LOW 20 MIN: CPT | Performed by: INTERNAL MEDICINE

## 2021-03-02 PROCEDURE — G8427 DOCREV CUR MEDS BY ELIG CLIN: HCPCS | Performed by: INTERNAL MEDICINE

## 2021-03-02 RX ORDER — BUPRENORPHINE AND NALOXONE 8; 2 MG/1; MG/1
1 FILM, SOLUBLE BUCCAL; SUBLINGUAL 2 TIMES DAILY
Qty: 42 FILM | Refills: 0 | Status: SHIPPED | OUTPATIENT
Start: 2021-03-02 | End: 2021-03-23 | Stop reason: SDUPTHER

## 2021-03-02 NOTE — PROGRESS NOTES
3/2/2021                HPI  I saw him here  1/25  We did a virtual visit 2/16    Urine from that visit in January showed gabapentin and Xanax     He says he has not done any Xanax but he does admit to doing gabapentin                 Patient started using heroin 3 years ago  He started on pain pills in 2016     Patient uses it he began snorting it but now IV  Other drugs used: If he could not find heroin he would use anything he can get as he puts it to get out of himself  Sub[facility]one programs in the past [facility]  He has been there a couple of years but he cannot afford it  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 Authorizing Provider   buprenorphine-naloxone (SUBOXONE) 8-2 MG FILM SL film Place 1 Film under the tongue 2 times daily for 21 days.  Yes Shaka Florence MD   cloNIDine (CATAPRES) 0.1 MG tablet Take 1 tablet by mouth nightly  [employer] Anette PAUmaC   buPROPion (WELLBUTRIN XL) 150 MG extended release tablet Take 1 tablet by mouth every morning  [employer] Anette PATELLY   buPROPion (WELLBUTRIN XL) 150 MG extended release tablet Take 1 tablet by mouth every morning  Shaka Florence MD   cloNIDine (CATAPRES) 0.1 MG tablet Take 1 tablet by mouth daily  Shaka Florence MD   doxyLAMINE succinate (GNP SLEEP AID) 25 MG tablet Take 1 tablet by mouth nightly as needed for Sleep  [employer] Anette PAUmaC   predniSONE (DELTASONE) 20 MG tablet 40 mg/ day for week 1, 20 mg/ day for week 2  JOHNATHAN Llamas - NP   ibuprofen (ADVIL;MOTRIN) 200 MG tablet Take 400 mg by mouth every 6 hours as needed for Pain  Historical Provider, MD       Social History     Tobacco Use    Smoking status: Current Every Day Smoker     Packs/day: 0.50

## 2021-03-02 NOTE — PROGRESS NOTES
Verbal order per Dr. Darryl Andino for urine drug screen. Positive for BUP. Verified results with Missael Regan LPN. Dr. Darryl Andino ordered Suboxone 8 mg film BID for patient. Verified dose with patient. Patient was sent home with 3 week script for Suboxone 8 mg film BID and will be seen back in the office on 3/23/21.

## 2021-03-23 ENCOUNTER — OFFICE VISIT (OUTPATIENT)
Dept: INTERNAL MEDICINE CLINIC | Age: 26
End: 2021-03-23
Payer: MEDICARE

## 2021-03-23 VITALS
TEMPERATURE: 97.9 F | DIASTOLIC BLOOD PRESSURE: 84 MMHG | HEART RATE: 92 BPM | HEIGHT: 73 IN | SYSTOLIC BLOOD PRESSURE: 132 MMHG | WEIGHT: 166 LBS | BODY MASS INDEX: 22 KG/M2

## 2021-03-23 DIAGNOSIS — F19.10 POLYSUBSTANCE ABUSE (HCC): ICD-10-CM

## 2021-03-23 DIAGNOSIS — F41.9 ANXIETY: ICD-10-CM

## 2021-03-23 DIAGNOSIS — Z79.899 ENCOUNTER FOR MONITORING SUBOXONE MAINTENANCE THERAPY: ICD-10-CM

## 2021-03-23 DIAGNOSIS — F11.20 SEVERE OPIOID USE DISORDER (HCC): Primary | ICD-10-CM

## 2021-03-23 DIAGNOSIS — Z51.81 ENCOUNTER FOR MONITORING SUBOXONE MAINTENANCE THERAPY: ICD-10-CM

## 2021-03-23 PROCEDURE — 99213 OFFICE O/P EST LOW 20 MIN: CPT | Performed by: INTERNAL MEDICINE

## 2021-03-23 PROCEDURE — G8484 FLU IMMUNIZE NO ADMIN: HCPCS | Performed by: INTERNAL MEDICINE

## 2021-03-23 PROCEDURE — 80305 DRUG TEST PRSMV DIR OPT OBS: CPT | Performed by: INTERNAL MEDICINE

## 2021-03-23 PROCEDURE — G8427 DOCREV CUR MEDS BY ELIG CLIN: HCPCS | Performed by: INTERNAL MEDICINE

## 2021-03-23 PROCEDURE — G8420 CALC BMI NORM PARAMETERS: HCPCS | Performed by: INTERNAL MEDICINE

## 2021-03-23 PROCEDURE — 4004F PT TOBACCO SCREEN RCVD TLK: CPT | Performed by: INTERNAL MEDICINE

## 2021-03-23 RX ORDER — BUPRENORPHINE AND NALOXONE 8; 2 MG/1; MG/1
1 FILM, SOLUBLE BUCCAL; SUBLINGUAL 2 TIMES DAILY
Qty: 42 FILM | Refills: 0 | Status: SHIPPED | OUTPATIENT
Start: 2021-03-23 | End: 2021-04-13 | Stop reason: SDUPTHER

## 2021-03-23 NOTE — PROGRESS NOTES
3/23/2021                Drug Problem      I saw him here  3/2  We did a virtual visit   He says his grandmother  recently  He also cut his hours back at work  Urine from a visit in January showed gabapentin and Xanax     He says he has not done any Xanax but he does admit to doing gabapentin                 Patient started using heroin 3 years ago  He started on pain pills in 2016     Patient uses it he began snorting it but now IV  Other drugs used: If he could not find heroin he would use anything he can get as he puts it to get out of himself  Suboxone programs in the past [facility]  He has been there a couple of years but he cannot afford it  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 Authorizing Provider   buprenorphine-naloxone (SUBOXONE) 8-2 MG FILM SL film Place 1 Film under the tongue 2 times daily for 21 days.  Yes Leticia Lo MD   cloNIDine (CATAPRES) 0.1 MG tablet Take 1 tablet by mouth nightly  [employer] PAUmaC   buPROPion (WELLBUTRIN XL) 150 MG extended release tablet Take 1 tablet by mouth every morning  [employer] PATELLY   buPROPion (WELLBUTRIN XL) 150 MG extended release tablet Take 1 tablet by mouth every morning  Leticia Lo MD   cloNIDine (CATAPRES) 0.1 MG tablet Take 1 tablet by mouth daily  Leticia Lo MD   doxyLAMINE succinate (GNP SLEEP AID) 25 MG tablet Take 1 tablet by mouth nightly as needed for Sleep  [employer] PAUmaC   predniSONE (DELTASONE) 20 MG tablet 40 mg/ day for week 1, 20 mg/ day for week 2  JOHNATHAN Aponte - NP   ibuprofen (ADVIL;MOTRIN) 200 MG tablet Take 400 mg by mouth every 6 hours as needed for Pain  Historical Provider, MD       Social History Tobacco Use    Smoking status: Current Every Day Smoker     Packs/day: 0.50     Types: Cigarettes     Start date: 9/17/2014    Smokeless tobacco: Former User     Quit date: 9/17/2015   Substance Use Topics    Alcohol use: No    Drug use: Yes     Frequency: 7.0 times per week     Types: Marijuana, IV, Opiates      Comment: heroin snort and iv, LAST USED METH 10/16/2019            PHYSICAL EXAMINATION:  [ INSTRUCTIONS:  \"[x]\" Indicates a positive item  \"[]\" Indicates a negative item  -- DELETE ALL ITEMS NOT EXAMINED]  No vitals done    Constitutional: [x] Appears well-developed and well-nourished [x] No apparent distress      [] Abnormal-   Mental status  [x] Alert and awake  [x] Oriented to person/place/time [x]Able to follow commands      Eyes:  EOM    [x]  Normal  [] Abnormal-  Sclera  []  Normal  [] Abnormal -         Discharge []  None visible  [] Abnormal -  Pupils normal           Psychiatric:       [x] Normal Affect [] No Hallucinations        [] Abnormal-   Urine tox screen today  Alcohol, Urine 03/23/2021  7:55 AM Unknown   NEG    Amphetamine Screen, Urine 03/23/2021  7:55 AM Unknown   NEG    Barbiturate Screen, Urine 03/23/2021  7:55 AM Unknown   NEG    Benzodiazepine Screen, Urine 03/23/2021  7:55 AM Unknown   NEG    Buprenorphine Urine 03/23/2021  7:55 AM Unknown   POS    Cocaine Metabolite Screen, Urine 03/23/2021  7:55 AM Unknown   NEG    FENTANYL SCREEN, URINE 03/23/2021  7:55 AM Unknown   NEG    Gabapentin Screen, Urine 03/23/2021  7:55 AM Unknown   N/A    MDMA, Urine 03/23/2021  7:55 AM Unknown   NEG    Methadone Screen, Urine 03/23/2021  7:55 AM Unknown   NEG    Methamphetamine, Urine 03/23/2021  7:55 AM Unknown   NEG    Opiate Scrn, Ur 03/23/2021  7:55 AM Unknown   NEG    Oxycodone Screen, Ur 03/23/2021  7:55 AM Unknown   NEG    PCP Screen, Urine 03/23/2021  7:55 AM Unknown   NEG    Propoxyphene Screen, Urine 03/23/2021  7:55 AM Unknown   N/A    Synthetic Cannabinoids (K2) Screen, Urine 03/23/2021  7:55 AM Unknown   NEG    THC Screen, Urine 03/23/2021  7:55 AM Unknown   POS    Tramadol Scrn, Ur 03/23/2021  7:55 AM Unknown   NEG    Tricyclic Antidepressants, Urine 03/23/2021  7:55 AM Unknown   N/A         Diagnosis Orders   1. Severe opioid use disorder (HCC)  POCT Rapid Drug Screen    buprenorphine-naloxone (SUBOXONE) 8-2 MG FILM SL film   2. Encounter for monitoring Suboxone maintenance therapy     3. Anxiety     4. Polysubstance abuse ([facility]     Initial urine today was positive for cocaine then it be clean questionable  We will send a comprehensive urine   follow-up here 3 weeks   I told him to expect gabapentin to be out of his system  I reviewed the [place] Automated Rx Reporting System report     There does not appear to be any discrepancies or overprescribing of controlled substances    --Calvin Almazan MD on 3/23/2021 at 12:31 PM    An electronic signature was used to authenticate this note.

## 2021-03-23 NOTE — PROGRESS NOTES
Verbal order per Dr. Ray Mejia for urine drug screen. Positive for BUP  THC . Verified results with Lorenzo Barba RN. Dr. Ray Mejia ordered Suboxone 8mg film BID for patient. Verified dose with patient. Patient was sent home with 3 week script of Suboxone 8mg film BID and will be seen back in the office 4/13/21.

## 2021-04-13 ENCOUNTER — OFFICE VISIT (OUTPATIENT)
Dept: INTERNAL MEDICINE CLINIC | Age: 26
End: 2021-04-13
Payer: MEDICARE

## 2021-04-13 VITALS
SYSTOLIC BLOOD PRESSURE: 143 MMHG | DIASTOLIC BLOOD PRESSURE: 93 MMHG | WEIGHT: 167 LBS | HEIGHT: 73 IN | TEMPERATURE: 98.2 F | BODY MASS INDEX: 22.13 KG/M2 | HEART RATE: 118 BPM

## 2021-04-13 DIAGNOSIS — F41.9 ANXIETY: ICD-10-CM

## 2021-04-13 DIAGNOSIS — G62.9 NEUROPATHY: ICD-10-CM

## 2021-04-13 DIAGNOSIS — Z51.81 ENCOUNTER FOR MONITORING SUBOXONE MAINTENANCE THERAPY: ICD-10-CM

## 2021-04-13 DIAGNOSIS — F19.10 POLYSUBSTANCE ABUSE (HCC): ICD-10-CM

## 2021-04-13 DIAGNOSIS — F11.20 SEVERE OPIOID USE DISORDER (HCC): Primary | ICD-10-CM

## 2021-04-13 DIAGNOSIS — Z79.899 ENCOUNTER FOR MONITORING SUBOXONE MAINTENANCE THERAPY: ICD-10-CM

## 2021-04-13 PROCEDURE — 80305 DRUG TEST PRSMV DIR OPT OBS: CPT | Performed by: INTERNAL MEDICINE

## 2021-04-13 PROCEDURE — 99213 OFFICE O/P EST LOW 20 MIN: CPT | Performed by: INTERNAL MEDICINE

## 2021-04-13 PROCEDURE — 4004F PT TOBACCO SCREEN RCVD TLK: CPT | Performed by: INTERNAL MEDICINE

## 2021-04-13 PROCEDURE — G8428 CUR MEDS NOT DOCUMENT: HCPCS | Performed by: INTERNAL MEDICINE

## 2021-04-13 PROCEDURE — G8420 CALC BMI NORM PARAMETERS: HCPCS | Performed by: INTERNAL MEDICINE

## 2021-04-13 RX ORDER — BUPRENORPHINE AND NALOXONE 8; 2 MG/1; MG/1
1 FILM, SOLUBLE BUCCAL; SUBLINGUAL 2 TIMES DAILY
Qty: 14 FILM | Refills: 0 | Status: SHIPPED | OUTPATIENT
Start: 2021-04-13 | End: 2021-04-20 | Stop reason: SDUPTHER

## 2021-04-13 NOTE — PROGRESS NOTES
2021                Drug Problem      I saw him here  3/23  We did a virtual visit   He says his grandmother  recently  He also cut his hours back at work  Urine from a visit in January showed gabapentin and Xanax     He says he has not done any Xanax but he does admit to doing gabapentin     He showed up this morning then left because he had to help his mother who had car trouble         Patient started using heroin 3 years ago  He started on pain pills in      Patient uses it he began snorting it but now IV  Other drugs used: If he could not find heroin he would use anything he can get as he puts it to get out of himself  Suboxone programs in the past [facility] in [place]  He has been there a couple of years but he cannot afford it  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 Authorizing Provider   buprenorphine-naloxone (SUBOXONE) 8-2 MG FILM SL film Place 1 Film under the tongue 2 times daily for 7 days.  Yes Halley Morales MD   cloNIDine (CATAPRES) 0.1 MG tablet Take 1 tablet by mouth nightly  [employer], PA-C   buPROPion (WELLBUTRIN XL) 150 MG extended release tablet Take 1 tablet by mouth every morning  [employer], PA-C   buPROPion (WELLBUTRIN XL) 150 MG extended release tablet Take 1 tablet by mouth every morning  Halley Morales MD   cloNIDine (CATAPRES) 0.1 MG tablet Take 1 tablet by mouth daily  Halley Morales MD   doxyLAMINE succinate (GNP SLEEP AID) 25 MG tablet Take 1 tablet by mouth nightly as needed for Sleep  [employer], PA-C   predniSONE (DELTASONE) 20 MG tablet 40 mg/ day for week 1, 20 mg/ day for week 2  JOHNATHAN Nicole NP   ibuprofen (ADVIL;MOTRIN) 200 MG tablet Take 400 mg by mouth every 6 hours as needed for Pain  Historical Provider, MD       Social History     Tobacco Use    Smoking status: Current Every Day Smoker     Packs/day: 0.50     Types: Cigarettes     Start date: 9/17/2014    Smokeless tobacco: Former User     Quit date: 9/17/2015   Substance Use Topics    Alcohol use: No    Drug use: Yes     Frequency: 7.0 times per week     Types: Marijuana, IV, Opiates      Comment: heroin snort and iv, LAST USED METH 10/16/2019            PHYSICAL EXAMINATION:  [ INSTRUCTIONS:  \"[x]\" Indicates a positive item  \"[]\" Indicates a negative item  -- DELETE ALL ITEMS NOT EXAMINED]  No vitals done    Constitutional: [x] Appears well-developed and well-nourished [x] No apparent distress      [] Abnormal-   Mental status  [x] Alert and awake  [x] Oriented to person/place/time [x]Able to follow commands      Eyes:  EOM    [x]  Normal  [] Abnormal-  Sclera  []  Normal  [] Abnormal -         Discharge []  None visible  [] Abnormal -  Pupils normal           Psychiatric:       [x] Normal Affect [] No Hallucinations        [] Abnormal-   Urine tox screen today     Diagnosis Orders   1. Severe opioid use disorder (HCC)  POCT Rapid Drug Screen    buprenorphine-naloxone (SUBOXONE) 8-2 MG FILM SL film   2. Encounter for monitoring Suboxone maintenance therapy     3. Polysubstance abuse ([address]     4. Anxiety     5.  Neuropathy     Urine shows THC and buprenorphine  He told the nurse he saved his urine from prior to using meth  Send out from last time showed cocaine gabapentin   We will send a comprehensive urine   We will do a mouth swab comprehensive urine  I told him if he keeps using and tampering with his urine I will no longer give him Suboxone  I reviewed the [place] Automated Rx Reporting System report     There does not appear to be any discrepancies or overprescribing of controlled substances  Follow-up 1 week  He says he has an appoint with the foot doctor to get prescribed Neurontin  --Carmen Romero Papito Godinez MD on 4/13/2021 at 1:54 PM    An electronic signature was used to authenticate this note.

## 2021-04-20 ENCOUNTER — OFFICE VISIT (OUTPATIENT)
Dept: INTERNAL MEDICINE CLINIC | Age: 26
End: 2021-04-20
Payer: MEDICARE

## 2021-04-20 VITALS
SYSTOLIC BLOOD PRESSURE: 136 MMHG | HEIGHT: 73 IN | HEART RATE: 105 BPM | DIASTOLIC BLOOD PRESSURE: 94 MMHG | WEIGHT: 173 LBS | BODY MASS INDEX: 22.93 KG/M2 | TEMPERATURE: 97.8 F

## 2021-04-20 DIAGNOSIS — F19.10 POLYSUBSTANCE ABUSE (HCC): ICD-10-CM

## 2021-04-20 DIAGNOSIS — G47.00 INSOMNIA, UNSPECIFIED TYPE: ICD-10-CM

## 2021-04-20 DIAGNOSIS — G62.9 NEUROPATHY: ICD-10-CM

## 2021-04-20 DIAGNOSIS — Z51.81 ENCOUNTER FOR MONITORING SUBOXONE MAINTENANCE THERAPY: ICD-10-CM

## 2021-04-20 DIAGNOSIS — F32.A DEPRESSION, UNSPECIFIED DEPRESSION TYPE: ICD-10-CM

## 2021-04-20 DIAGNOSIS — Z79.899 ENCOUNTER FOR MONITORING SUBOXONE MAINTENANCE THERAPY: ICD-10-CM

## 2021-04-20 DIAGNOSIS — F11.20 SEVERE OPIOID USE DISORDER (HCC): Primary | ICD-10-CM

## 2021-04-20 PROCEDURE — 80305 DRUG TEST PRSMV DIR OPT OBS: CPT | Performed by: INTERNAL MEDICINE

## 2021-04-20 PROCEDURE — G8427 DOCREV CUR MEDS BY ELIG CLIN: HCPCS | Performed by: INTERNAL MEDICINE

## 2021-04-20 PROCEDURE — G8420 CALC BMI NORM PARAMETERS: HCPCS | Performed by: INTERNAL MEDICINE

## 2021-04-20 PROCEDURE — 99213 OFFICE O/P EST LOW 20 MIN: CPT | Performed by: INTERNAL MEDICINE

## 2021-04-20 PROCEDURE — 4004F PT TOBACCO SCREEN RCVD TLK: CPT | Performed by: INTERNAL MEDICINE

## 2021-04-20 RX ORDER — BUPRENORPHINE AND NALOXONE 8; 2 MG/1; MG/1
1 FILM, SOLUBLE BUCCAL; SUBLINGUAL 2 TIMES DAILY
Qty: 14 FILM | Refills: 0 | Status: SHIPPED | OUTPATIENT
Start: 2021-04-20 | End: 2021-04-28 | Stop reason: SDUPTHER

## 2021-04-20 RX ORDER — CLONIDINE HYDROCHLORIDE 0.1 MG/1
0.1 TABLET ORAL NIGHTLY
Qty: 30 TABLET | Refills: 0 | Status: SHIPPED | OUTPATIENT
Start: 2021-04-20

## 2021-04-20 RX ORDER — BUPROPION HYDROCHLORIDE 150 MG/1
150 TABLET ORAL EVERY MORNING
Qty: 30 TABLET | Refills: 3 | Status: SHIPPED | OUTPATIENT
Start: 2021-04-20

## 2021-04-20 NOTE — PROGRESS NOTES
2021                Drug Problem      I saw him here  3/14    He says his grandmother  recently  He also cut his hours back at work  Urine from a visit in January showed gabapentin and Xanax     He says he has not done any Xanax but he does admit to doing gabapentin             Patient started using heroin 3 years ago  He started on pain pills in 2016     Patient uses it he began snorting it but now IV  Other drugs used: If he could not find heroin he would use anything he can get as he puts it to get out of himself  Suboxone programs in the past [facility]  He has been there a couple of years but he cannot afford it  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 Authorizing Provider   buprenorphine-naloxone (SUBOXONE) 8-2 MG FILM SL film Place 1 Film under the tongue 2 times daily for 7 days.  Yes Samir Francois MD   cloNIDine (CATAPRES) 0.1 MG tablet Take 1 tablet by mouth nightly Yes Samir Francois MD   buPROPion (WELLBUTRIN XL) 150 MG extended release tablet Take 1 tablet by mouth every morning Yes Samir Francois MD   buPROPion (WELLBUTRIN XL) 150 MG extended release tablet Take 1 tablet by mouth every morning  Brooks Mercado PA-C   cloNIDine (CATAPRES) 0.1 MG tablet Take 1 tablet by mouth daily  Samir Francois MD   doxyLAMINE succinate (GNP SLEEP AID) 25 MG tablet Take 1 tablet by mouth nightly as needed for Sleep  Brooks Mercado PA-C   predniSONE (DELTASONE) 20 MG tablet 40 mg/ day for week 1, 20 mg/ day for week 2  JOHNATHAN Dietz NP   ibuprofen (ADVIL;MOTRIN) 200 MG tablet Take 400 mg by mouth every 6 hours as needed for Pain  Historical Provider, MD       Social History     Tobacco Use    Smoking status: Current Every Day Smoker     Packs/day: 0.50     Types: Cigarettes     Start date: 9/17/2014    Smokeless tobacco: Former User     Quit date: 9/17/2015   Substance Use Topics    Alcohol use: No    Drug use: Yes     Frequency: 7.0 times per week     Types: Marijuana, IV, Opiates      Comment: heroin snort and iv, LAST USED METH 10/16/2019            PHYSICAL EXAMINATION:  [ INSTRUCTIONS:  \"[x]\" Indicates a positive item  \"[]\" Indicates a negative item  -- DELETE ALL ITEMS NOT EXAMINED]  No vitals done    Constitutional: [x] Appears well-developed and well-nourished [x] No apparent distress      [] Abnormal-   Mental status  [x] Alert and awake  [x] Oriented to person/place/time [x]Able to follow commands      Eyes:  EOM    [x]  Normal  [] Abnormal-  Sclera  []  Normal  [] Abnormal -         Discharge []  None visible  [] Abnormal -  Pupils normal           Psychiatric:       [x] Normal Affect [] No Hallucinations        [] Abnormal-   Urine tox screen today     Diagnosis Orders   1. Severe opioid use disorder (HCC)  POCT Rapid Drug Screen    buprenorphine-naloxone (SUBOXONE) 8-2 MG FILM SL film   2. Insomnia, unspecified type  cloNIDine (CATAPRES) 0.1 MG tablet   3. Encounter for monitoring Suboxone maintenance therapy     4. Polysubstance abuse ([facility] [address]     5. Depression, unspecified depression type     6.  Neuropathy     Urine shows THC and buprenorphine  The issue has been him using the Neurontin and methamphetamines  I told him if he keeps using this and tampering with his urine which she has done I will no longer give him Suboxone  He says he has been clean for the last week  He is trying to get into a podiatrist because he is having severe foot pain    He says he has not taken Neurontin in the last week  We will send comprehensive urine once again  I reviewed the [place] Automated Rx Reporting System report     There does not appear to be any discrepancies or overprescribing of controlled

## 2021-04-20 NOTE — PROGRESS NOTES
Verbal order per Dr. Libby Knight for urine drug screen. Positive for BUP THC. Verified results with Shelli Parish RN. Dr. Libby Knight ordered Suboxone 8mg film BID for patient. Verified dose with patient. Patient was sent home with 1 week script of Suboxone 8mg film BID, Wellbutrin XR 150mg daily for 30 days and Clonidine 0.1mg daily for 30 days  and will be seen back in the office 4/27/21. UC sent.

## 2021-04-28 ENCOUNTER — OFFICE VISIT (OUTPATIENT)
Dept: INTERNAL MEDICINE CLINIC | Age: 26
End: 2021-04-28
Payer: MEDICARE

## 2021-04-28 VITALS
TEMPERATURE: 97.9 F | HEIGHT: 73 IN | WEIGHT: 163 LBS | BODY MASS INDEX: 21.6 KG/M2 | HEART RATE: 108 BPM | DIASTOLIC BLOOD PRESSURE: 88 MMHG | SYSTOLIC BLOOD PRESSURE: 130 MMHG

## 2021-04-28 DIAGNOSIS — G62.9 NEUROPATHY: ICD-10-CM

## 2021-04-28 DIAGNOSIS — G89.4 CHRONIC PAIN SYNDROME: ICD-10-CM

## 2021-04-28 DIAGNOSIS — Z51.81 ENCOUNTER FOR MONITORING SUBOXONE MAINTENANCE THERAPY: ICD-10-CM

## 2021-04-28 DIAGNOSIS — F11.20 SEVERE OPIOID USE DISORDER (HCC): Primary | ICD-10-CM

## 2021-04-28 DIAGNOSIS — Z79.899 ENCOUNTER FOR MONITORING SUBOXONE MAINTENANCE THERAPY: ICD-10-CM

## 2021-04-28 DIAGNOSIS — F19.10 POLYSUBSTANCE ABUSE (HCC): ICD-10-CM

## 2021-04-28 DIAGNOSIS — F32.A DEPRESSION, UNSPECIFIED DEPRESSION TYPE: ICD-10-CM

## 2021-04-28 PROCEDURE — 4004F PT TOBACCO SCREEN RCVD TLK: CPT | Performed by: INTERNAL MEDICINE

## 2021-04-28 PROCEDURE — 80305 DRUG TEST PRSMV DIR OPT OBS: CPT | Performed by: INTERNAL MEDICINE

## 2021-04-28 PROCEDURE — G8427 DOCREV CUR MEDS BY ELIG CLIN: HCPCS | Performed by: INTERNAL MEDICINE

## 2021-04-28 PROCEDURE — 99213 OFFICE O/P EST LOW 20 MIN: CPT | Performed by: INTERNAL MEDICINE

## 2021-04-28 PROCEDURE — G8420 CALC BMI NORM PARAMETERS: HCPCS | Performed by: INTERNAL MEDICINE

## 2021-04-28 RX ORDER — BUPRENORPHINE AND NALOXONE 8; 2 MG/1; MG/1
1 FILM, SOLUBLE BUCCAL; SUBLINGUAL 2 TIMES DAILY
Qty: 26 FILM | Refills: 0 | Status: SHIPPED | OUTPATIENT
Start: 2021-04-28 | End: 2021-04-28

## 2021-04-28 RX ORDER — BUPRENORPHINE AND NALOXONE 8; 2 MG/1; MG/1
1 FILM, SOLUBLE BUCCAL; SUBLINGUAL 2 TIMES DAILY
Qty: 10 FILM | Refills: 0 | OUTPATIENT
Start: 2021-04-28 | End: 2021-05-03

## 2021-04-28 NOTE — PROGRESS NOTES
2021                Drug Problem      I saw him here    He missed appointment yesterday he said it had to do with his work  He says his grandmother  recently  He also cut his hours back at work  Urine from a visit in January showed gabapentin and Xanax     He says he has not done any Xanax but he does admit to doing gabapentin             Patient started using heroin 3 years ago  He started on pain pills in 2016     Patient uses it he began snorting it but now IV  Other drugs used: If he could not find heroin he would use anything he can get as he puts it to get out of himself  Suboxone programs in the past [facility] in [place]  He has been there a couple of years but he cannot afford it  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 Authorizing Provider   buprenorphine-naloxone (SUBOXONE) 8-2 MG FILM SL film Place 1 Film under the tongue 2 times daily for 5 days.  Yes Leila Vann MD   cloNIDine (CATAPRES) 0.1 MG tablet Take 1 tablet by mouth nightly  Leila Vann MD   buPROPion (WELLBUTRIN XL) 150 MG extended release tablet Take 1 tablet by mouth every morning  Leila Vann MD   buPROPion (WELLBUTRIN XL) 150 MG extended release tablet Take 1 tablet by mouth every morning  Stephanie Mcelroy PA-C   cloNIDine (CATAPRES) 0.1 MG tablet Take 1 tablet by mouth daily  Leila Vann MD   doxyLAMINE succinate (GNP SLEEP AID) 25 MG tablet Take 1 tablet by mouth nightly as needed for Sleep  [employer] SOULEY[employer]E [employer]   predniSONE (DELTASONE) 20 MG tablet 40 mg/ day for week 1, 20 mg/ day for week 2  JOHNATHAN Block NP   ibuprofen (ADVIL;MOTRIN) 200 MG tablet Take 400 mg by mouth every 6 hours as needed for Pain Historical Provider, MD       Social History     Tobacco Use    Smoking status: Current Every Day Smoker     Packs/day: 0.50     Types: Cigarettes     Start date: 9/17/2014    Smokeless tobacco: Former User     Quit date: 9/17/2015   Substance Use Topics    Alcohol use: No    Drug use: Yes     Frequency: 7.0 times per week     Types: Marijuana, IV, Opiates      Comment: heroin snort and iv, LAST USED METH 10/16/2019            PHYSICAL EXAMINATION:  [ INSTRUCTIONS:  \"[x]\" Indicates a positive item  \"[]\" Indicates a negative item  -- DELETE ALL ITEMS NOT EXAMINED]  No vitals done    Constitutional: [x] Appears well-developed and well-nourished [x] No apparent distress      [] Abnormal-   Mental status  [x] Alert and awake  [x] Oriented to person/place/time [x]Able to follow commands      Eyes:  EOM    [x]  Normal  [] Abnormal-  Sclera  []  Normal  [] Abnormal -         Discharge []  None visible  [] Abnormal -  Pupils normal           Psychiatric:       [x] Normal Affect [] No Hallucinations        [] Abnormal-   Urine tox screen today  Alcohol, Urine 04/28/2021  1:15 PM Unknown   NEG    Amphetamine Screen, Urine 04/28/2021  1:15 PM Unknown   NEG    Barbiturate Screen, Urine 04/28/2021  1:15 PM Unknown   NEG    Benzodiazepine Screen, Urine 04/28/2021  1:15 PM Unknown   NEG    Buprenorphine Urine 04/28/2021  1:15 PM Unknown   POS    Cocaine Metabolite Screen, Urine 04/28/2021  1:15 PM Unknown   NEG    FENTANYL SCREEN, URINE 04/28/2021  1:15 PM Unknown   NEG    Gabapentin Screen, Urine 04/28/2021  1:15 PM Unknown   N/A    MDMA, Urine 04/28/2021  1:15 PM Unknown   NEG    Methadone Screen, Urine 04/28/2021  1:15 PM Unknown   NEG    Methamphetamine, Urine 04/28/2021  1:15 PM Unknown   NEG    Opiate Scrn, Ur 04/28/2021  1:15 PM Unknown   NEG    Oxycodone Screen, Ur 04/28/2021  1:15 PM Unknown   NEG    PCP Screen, Urine 04/28/2021  1:15 PM Unknown   NEG    Propoxyphene Screen, Urine 04/28/2021  1:15 PM Unknown   N/A Synthetic Cannabinoids (K2) Screen, Urine 04/28/2021  1:15 PM Unknown   NEG    THC Screen, Urine 04/28/2021  1:15 PM Unknown   POS    Tramadol Scrn, Ur 04/28/2021  1:15 PM Unknown   NEG    Tricyclic Antidepressants, Urine 04/28/2021  1:15 PM Unknown   N/A         Diagnosis Orders   1. Severe opioid use disorder (HCC)  POCT Rapid Drug Screen    buprenorphine-naloxone (SUBOXONE) 8-2 MG FILM SL film    DISCONTINUED: buprenorphine-naloxone (SUBOXONE) 8-2 MG FILM SL film   2. Polysubstance abuse ([facility] [address]     3. Encounter for monitoring Suboxone maintenance therapy     4. Neuropathy     5. Depression, unspecified depression type     6. Chronic pain syndrome     Urine shows THC and buprenorphine  I do not really believe it  We did an oral swab comprehensive urine  He came back to my office 3 times questioning what was happening  I think he is dirty  He says he has been taking Neurontin for his foot  I told him he needs to see the podiatrist  I will refill his Suboxone and see him 5/3  --Bobbi Ballard MD on 5/1/2021 at 4:59 PM    An electronic signature was used to authenticate this note.

## 2021-04-28 NOTE — PROGRESS NOTES
Verbal order per Dr. Stewart Fischer for urine drug screen. Positive for BUP, THC. Verified results with Michael Ching LPN. Dr. Stewart Fischer ordered Suboxone 8 mg film BID for patient. Verified dose with patient. Patient was sent home with 5 day script for Suboxone 8 mg film BID and will be seen back in the office on 5/03/21. UC and oral swab sent.

## 2021-05-07 ENCOUNTER — NURSE ONLY (OUTPATIENT)
Dept: INTERNAL MEDICINE CLINIC | Age: 26
End: 2021-05-07
Payer: MEDICARE

## 2021-05-07 ENCOUNTER — TELEPHONE (OUTPATIENT)
Dept: INTERNAL MEDICINE CLINIC | Age: 26
End: 2021-05-07

## 2021-05-07 VITALS
DIASTOLIC BLOOD PRESSURE: 89 MMHG | WEIGHT: 167 LBS | HEART RATE: 88 BPM | BODY MASS INDEX: 22.13 KG/M2 | SYSTOLIC BLOOD PRESSURE: 135 MMHG | TEMPERATURE: 98.2 F | HEIGHT: 73 IN

## 2021-05-07 DIAGNOSIS — F11.20 SEVERE OPIOID USE DISORDER (HCC): Primary | ICD-10-CM

## 2021-05-07 PROCEDURE — 80305 DRUG TEST PRSMV DIR OPT OBS: CPT | Performed by: NURSE PRACTITIONER

## 2021-05-07 RX ORDER — BUPRENORPHINE AND NALOXONE 8; 2 MG/1; MG/1
1 FILM, SOLUBLE BUCCAL; SUBLINGUAL 2 TIMES DAILY
COMMUNITY
End: 2021-05-07 | Stop reason: SDUPTHER

## 2021-05-07 RX ORDER — BUPRENORPHINE AND NALOXONE 8; 2 MG/1; MG/1
1 FILM, SOLUBLE BUCCAL; SUBLINGUAL 2 TIMES DAILY
Qty: 8 FILM | Refills: 0 | OUTPATIENT
Start: 2021-05-07 | End: 2021-05-11

## 2021-05-07 NOTE — TELEPHONE ENCOUNTER
Verbal order per Merlinda Duty CNP for Suboxone 8mg film BID for 4 days qty 8.     LPN called in script of Suboxone 8mg film BID for 4 days qty 8 into [facility] spoke with Lang Craft, pharmacist.

## 2021-06-01 ENCOUNTER — NURSE ONLY (OUTPATIENT)
Dept: INTERNAL MEDICINE CLINIC | Age: 26
End: 2021-06-01
Payer: MEDICARE

## 2021-06-01 ENCOUNTER — TELEPHONE (OUTPATIENT)
Dept: INTERNAL MEDICINE CLINIC | Age: 26
End: 2021-06-01

## 2021-06-01 VITALS
HEIGHT: 73 IN | TEMPERATURE: 97.9 F | HEART RATE: 133 BPM | SYSTOLIC BLOOD PRESSURE: 133 MMHG | DIASTOLIC BLOOD PRESSURE: 101 MMHG | BODY MASS INDEX: 22.03 KG/M2

## 2021-06-01 DIAGNOSIS — F11.20 SEVERE OPIOID USE DISORDER (HCC): Primary | ICD-10-CM

## 2021-06-01 PROCEDURE — 80305 DRUG TEST PRSMV DIR OPT OBS: CPT | Performed by: NURSE PRACTITIONER

## 2021-06-01 RX ORDER — BUPRENORPHINE HYDROCHLORIDE, NALOXONE HYDROCHLORIDE 8; 2 MG/1; MG/1
1 FILM, SOLUBLE BUCCAL; SUBLINGUAL 2 TIMES DAILY
Qty: 4 FILM | Refills: 0 | OUTPATIENT
Start: 2021-06-01 | End: 2021-06-03 | Stop reason: SDUPTHER

## 2021-06-01 RX ORDER — BUPRENORPHINE AND NALOXONE 8; 2 MG/1; MG/1
1 FILM, SOLUBLE BUCCAL; SUBLINGUAL 2 TIMES DAILY
COMMUNITY
End: 2021-06-01 | Stop reason: SDUPTHER

## 2021-06-01 NOTE — PROGRESS NOTES
Verbal order per [facility] for urine drug screen. Positive for BUP THC. Verified results with Selena Prince. Chapar[facility]a Helms CNP ordered Suboxone 8mg film BID for patient. Verified dose with patient. Patient was sent home with 2 day script of Suboxone 8mg film BID and will be seen back in the office 6/3/21.

## 2021-06-01 NOTE — TELEPHONE ENCOUNTER
LPN called in script of Suboxone 8mg film BID for 2 days qty 4 into [facility].  LPN spoke with Prasanna Corcoran, pharmacist.

## 2021-06-03 ENCOUNTER — OFFICE VISIT (OUTPATIENT)
Dept: INTERNAL MEDICINE CLINIC | Age: 26
End: 2021-06-03
Payer: MEDICARE

## 2021-06-03 VITALS
HEART RATE: 107 BPM | HEIGHT: 73 IN | TEMPERATURE: 98.9 F | BODY MASS INDEX: 22 KG/M2 | SYSTOLIC BLOOD PRESSURE: 154 MMHG | WEIGHT: 166 LBS | DIASTOLIC BLOOD PRESSURE: 83 MMHG

## 2021-06-03 DIAGNOSIS — S99.921D INJURY OF RIGHT FOOT, SUBSEQUENT ENCOUNTER: ICD-10-CM

## 2021-06-03 DIAGNOSIS — F11.20 SEVERE OPIOID USE DISORDER (HCC): Primary | ICD-10-CM

## 2021-06-03 PROCEDURE — G8420 CALC BMI NORM PARAMETERS: HCPCS | Performed by: NURSE PRACTITIONER

## 2021-06-03 PROCEDURE — 99213 OFFICE O/P EST LOW 20 MIN: CPT | Performed by: NURSE PRACTITIONER

## 2021-06-03 PROCEDURE — 80305 DRUG TEST PRSMV DIR OPT OBS: CPT | Performed by: NURSE PRACTITIONER

## 2021-06-03 PROCEDURE — G8427 DOCREV CUR MEDS BY ELIG CLIN: HCPCS | Performed by: NURSE PRACTITIONER

## 2021-06-03 PROCEDURE — 4004F PT TOBACCO SCREEN RCVD TLK: CPT | Performed by: NURSE PRACTITIONER

## 2021-06-03 RX ORDER — BUPRENORPHINE HYDROCHLORIDE, NALOXONE HYDROCHLORIDE 8; 2 MG/1; MG/1
1 FILM, SOLUBLE BUCCAL; SUBLINGUAL 2 TIMES DAILY
Qty: 28 FILM | Refills: 0 | Status: SHIPPED | OUTPATIENT
Start: 2021-06-03 | End: 2021-06-18 | Stop reason: SDUPTHER

## 2021-06-03 RX ORDER — BUPRENORPHINE HYDROCHLORIDE, NALOXONE HYDROCHLORIDE 8; 2 MG/1; MG/1
1 FILM, SOLUBLE BUCCAL; SUBLINGUAL 2 TIMES DAILY
Qty: 14 FILM | Refills: 0 | Status: SHIPPED | OUTPATIENT
Start: 2021-06-03 | End: 2021-06-03 | Stop reason: SDUPTHER

## 2021-06-03 NOTE — PROGRESS NOTES
Verbal order per Lawrence Leidy CNP for urine drug screen. Positive for BUP,THC,ALCOHOL. Verified results with Que Day.

## 2021-06-04 ENCOUNTER — HOSPITAL ENCOUNTER (EMERGENCY)
Age: 26
Discharge: HOME | End: 2021-06-04
Payer: MEDICARE

## 2021-06-04 VITALS
WEIGHT: 170 LBS | HEIGHT: 72 IN | HEART RATE: 89 BPM | OXYGEN SATURATION: 97 % | TEMPERATURE: 97 F | BODY MASS INDEX: 23.03 KG/M2 | RESPIRATION RATE: 16 BRPM | SYSTOLIC BLOOD PRESSURE: 125 MMHG | DIASTOLIC BLOOD PRESSURE: 77 MMHG

## 2021-06-04 DIAGNOSIS — T63.481A ALLERGIC REACTION TO INSECT STING, ACCIDENTAL OR UNINTENTIONAL, INITIAL ENCOUNTER: Primary | ICD-10-CM

## 2021-06-04 PROCEDURE — 99213 OFFICE O/P EST LOW 20 MIN: CPT | Performed by: NURSE PRACTITIONER

## 2021-06-04 PROCEDURE — 99213 OFFICE O/P EST LOW 20 MIN: CPT

## 2021-06-04 PROCEDURE — 96372 THER/PROPH/DIAG INJ SC/IM: CPT

## 2021-06-04 PROCEDURE — 6360000002 HC RX W HCPCS: Performed by: NURSE PRACTITIONER

## 2021-06-04 RX ORDER — METHYLPREDNISOLONE ACETATE 80 MG/ML
80 INJECTION, SUSPENSION INTRA-ARTICULAR; INTRALESIONAL; INTRAMUSCULAR; SOFT TISSUE ONCE
Status: COMPLETED | OUTPATIENT
Start: 2021-06-04 | End: 2021-06-04

## 2021-06-04 RX ADMIN — METHYLPREDNISOLONE ACETATE 80 MG: 80 INJECTION, SUSPENSION INTRA-ARTICULAR; INTRALESIONAL; INTRAMUSCULAR; SOFT TISSUE at 15:57

## 2021-06-04 ASSESSMENT — ENCOUNTER SYMPTOMS
TROUBLE SWALLOWING: 0
CHEST TIGHTNESS: 0
NAUSEA: 0
SHORTNESS OF BREATH: 0
WHEEZING: 0

## 2021-06-04 NOTE — ED PROVIDER NOTES
[address] [facility] Encounter      [address] [facility]       Chief Complaint   Patient presents with    Insect Bite       Nurses Notes reviewed and I agree except as noted in the HPI. HISTORY OF PRESENT ILLNESS   Blanca Jensen is a 26 y.o. male who presents complaints of multiple insect bites. Onset of symptoms 2 days ago after mowing the yard with tall weeds. Lesions over entire body. Associated pruritus. No fever. No treatment    REVIEW OF SYSTEMS     Review of Systems   Constitutional: Negative for fatigue and fever. HENT: Negative for trouble swallowing. Respiratory: Negative for chest tightness, shortness of breath and wheezing. Cardiovascular: Negative for chest pain. Gastrointestinal: Negative for nausea. Musculoskeletal: Negative for neck pain and neck stiffness. Skin: Positive for rash. Neurological: Negative for headaches. Hematological: Negative for adenopathy. PAST MEDICAL HISTORY         Diagnosis Date    Psychiatric problem        SURGICAL HISTORY     Patient  has no past surgical history on file. CURRENT MEDICATIONS       Discharge Medication List as of 6/4/2021  4:00 PM      CONTINUE these medications which have NOT CHANGED    Details   SUBOXONE 8-2 MG FILM SL film Place 1 Film under the tongue 2 times daily for 14 days. , Disp-28 Film, R-0, DAWNormal      cloNIDine (CATAPRES) 0.1 MG tablet Take 1 tablet by mouth nightly, Disp-30 tablet, R-0Normal      buPROPion (WELLBUTRIN XL) 150 MG extended release tablet Take 1 tablet by mouth every morning, Disp-30 tablet, R-3Normal      doxyLAMINE succinate (GNP SLEEP AID) 25 MG tablet Take 1 tablet by mouth nightly as needed for Sleep, Disp-30 tablet,R-0Normal      ibuprofen (ADVIL;MOTRIN) 200 MG tablet Take 400 mg by mouth every 6 hours as needed for PainHistorical Med             ALLERGIES     Patient is has No Known Allergies. FAMILY HISTORY     Patient'sfamily history is not on file.  He was adopted. SOCIAL HISTORY     Patient  reports that he has been smoking cigarettes. He started smoking about 6 years ago. He has been smoking about 0.50 packs per day. He quit smokeless tobacco use about 5 years ago. He reports current drug use. Frequency: 7.00 times per week. Drug: Marijuana. He reports that he does not drink alcohol. PHYSICAL EXAM     ED TRIAGE VITALS  BP: 125/77, Temp: 97 °F (36.1 °C), Pulse: 89, Resp: 16, SpO2: 97 %  Physical Exam  Vitals and nursing note reviewed. Constitutional:       General: He is not in acute distress. Appearance: Normal appearance. He is well-developed. He is not ill-appearing. HENT:      Head: Normocephalic and atraumatic. Right Ear: External ear normal.      Left Ear: External ear normal.      Nose: No congestion. Eyes:      General: No scleral icterus. Conjunctiva/sclera: Conjunctivae normal.   Pulmonary:      Effort: Pulmonary effort is normal. No respiratory distress. Musculoskeletal:      Cervical back: Normal range of motion. Skin:     General: Skin is warm and dry. Capillary Refill: Capillary refill takes less than 2 seconds. Coloration: Skin is not jaundiced. Findings: Rash present. No abscess. Rash is macular. Comments: Sporadic macular lesions with minimal STS. C/w insect sting allergy. No secondary infection. Neurological:      Mental Status: He is alert and oriented to person, place, and time. Sensory: Sensation is intact. Psychiatric:         Mood and Affect: Mood normal.         Behavior: Behavior normal. Behavior is cooperative. DIAGNOSTIC RESULTS   Labs: No results found for this visit on 06/04/21.     IMAGING:  No orders to display     URGENT CARE COURSE:     Vitals:    06/04/21 1547   BP: 125/77   Pulse: 89   Resp: 16   Temp: 97 °F (36.1 °C)   TempSrc: Infrared   SpO2: 97%   Weight: 170 lb (77.1 kg)   Height: 6' (1.829 m)       Medications   methylPREDNISolone acetate (DEPO-MEDROL)

## 2021-06-04 NOTE — ED NOTES
Pt. Released in stable condition, ambulated per self to private car. Instructed pt to follow-up with family doctor as needed for recheck or go directly to the emergency department for any concerns/worsening conditions. Pt. Verbalized understanding of instructions. No questions at this time.       Monika Rodriges RN  06/04/21 [id number]

## 2021-06-15 ENCOUNTER — TELEPHONE (OUTPATIENT)
Dept: INTERNAL MEDICINE CLINIC | Age: 26
End: 2021-06-15

## 2021-06-16 ENCOUNTER — TELEPHONE (OUTPATIENT)
Dept: INTERNAL MEDICINE CLINIC | Age: 26
End: 2021-06-16

## 2021-06-18 DIAGNOSIS — F11.20 SEVERE OPIOID USE DISORDER (HCC): ICD-10-CM

## 2021-06-18 RX ORDER — BUPRENORPHINE HYDROCHLORIDE, NALOXONE HYDROCHLORIDE 8; 2 MG/1; MG/1
1 FILM, SOLUBLE BUCCAL; SUBLINGUAL 2 TIMES DAILY
Qty: 6 FILM | Refills: 0 | Status: SHIPPED | OUTPATIENT
Start: 2021-06-18 | End: 2021-06-22 | Stop reason: SDUPTHER

## 2021-06-18 NOTE — PROGRESS NOTES
Patient called, states that he thought his appt was today at 330. Rescheduled for Monday. Script sent.

## 2021-06-20 ASSESSMENT — ENCOUNTER SYMPTOMS
DIARRHEA: 0
WHEEZING: 0
BLOOD IN STOOL: 0
SINUS PAIN: 0
ABDOMINAL PAIN: 0
ABDOMINAL DISTENTION: 0
SHORTNESS OF BREATH: 0
VOMITING: 0
CONSTIPATION: 0
TROUBLE SWALLOWING: 0
SORE THROAT: 0
PHOTOPHOBIA: 0
SINUS PRESSURE: 0
NAUSEA: 0
RHINORRHEA: 0
COUGH: 0

## 2021-06-22 ENCOUNTER — OFFICE VISIT (OUTPATIENT)
Dept: INTERNAL MEDICINE CLINIC | Age: 26
End: 2021-06-22
Payer: MEDICARE

## 2021-06-22 VITALS
TEMPERATURE: 97.2 F | HEART RATE: 89 BPM | WEIGHT: 170 LBS | BODY MASS INDEX: 23.03 KG/M2 | SYSTOLIC BLOOD PRESSURE: 135 MMHG | DIASTOLIC BLOOD PRESSURE: 89 MMHG | HEIGHT: 72 IN

## 2021-06-22 DIAGNOSIS — S99.921D INJURY OF RIGHT FOOT, SUBSEQUENT ENCOUNTER: ICD-10-CM

## 2021-06-22 DIAGNOSIS — F11.20 SEVERE OPIOID USE DISORDER (HCC): ICD-10-CM

## 2021-06-22 DIAGNOSIS — G89.4 CHRONIC PAIN SYNDROME: ICD-10-CM

## 2021-06-22 DIAGNOSIS — F32.A DEPRESSION, UNSPECIFIED DEPRESSION TYPE: ICD-10-CM

## 2021-06-22 DIAGNOSIS — G62.9 NEUROPATHY: ICD-10-CM

## 2021-06-22 DIAGNOSIS — F11.20 SEVERE OPIOID USE DISORDER (HCC): Primary | ICD-10-CM

## 2021-06-22 DIAGNOSIS — Z79.899 ENCOUNTER FOR MONITORING SUBOXONE MAINTENANCE THERAPY: ICD-10-CM

## 2021-06-22 DIAGNOSIS — Z51.81 ENCOUNTER FOR MONITORING SUBOXONE MAINTENANCE THERAPY: ICD-10-CM

## 2021-06-22 DIAGNOSIS — F19.10 POLYSUBSTANCE ABUSE (HCC): ICD-10-CM

## 2021-06-22 PROCEDURE — G8427 DOCREV CUR MEDS BY ELIG CLIN: HCPCS | Performed by: INTERNAL MEDICINE

## 2021-06-22 PROCEDURE — 4004F PT TOBACCO SCREEN RCVD TLK: CPT | Performed by: INTERNAL MEDICINE

## 2021-06-22 PROCEDURE — G8420 CALC BMI NORM PARAMETERS: HCPCS | Performed by: INTERNAL MEDICINE

## 2021-06-22 PROCEDURE — 80305 DRUG TEST PRSMV DIR OPT OBS: CPT | Performed by: INTERNAL MEDICINE

## 2021-06-22 PROCEDURE — 99213 OFFICE O/P EST LOW 20 MIN: CPT | Performed by: INTERNAL MEDICINE

## 2021-06-22 RX ORDER — BUPRENORPHINE HYDROCHLORIDE, NALOXONE HYDROCHLORIDE 8; 2 MG/1; MG/1
1 FILM, SOLUBLE BUCCAL; SUBLINGUAL 2 TIMES DAILY
Qty: 4 FILM | Refills: 0 | Status: SHIPPED | OUTPATIENT
Start: 2021-06-22 | End: 2021-06-22 | Stop reason: SDUPTHER

## 2021-06-22 NOTE — PROGRESS NOTES
2021                Drug Problem      I saw him here    Patient has seen Archie Dan 3 times in the interim the last was 6/3  He missed appointment yesterday he said it had to do with his work  He says his grandmother  recently  He also cut his hours back at work  Urine from a visit in January showed gabapentin and Xanax     He says he has not done any Xanax but he does admit to doing gabapentin             Patient started using heroin 3 years ago  He started on pain pills in      Patient uses it he began snorting it but now IV  Other drugs used: If he could not find heroin he would use anything he can get as he puts it to get out of himself  Suboxone programs in the past [facility]  He has been there a couple of years but he cannot afford it  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 Authorizing Provider   buprenorphine-naloxone (SUBOXONE) 8-2 MG FILM SL film Place 1 Film under the tongue 2 times daily for 7 days.   Henrry Christian MD   cloNIDine (CATAPRES) 0.1 MG tablet Take 1 tablet by mouth nightly  Henrry Christian MD   buPROPion (WELLBUTRIN XL) 150 MG extended release tablet Take 1 tablet by mouth every morning  Henrry Christian MD   doxyLAMINE succinate (GNP SLEEP AID) 25 MG tablet Take 1 tablet by mouth nightly as needed for Sleep  [employer]   ibuprofen (ADVIL;MOTRIN) 200 MG tablet Take 400 mg by mouth every 6 hours as needed for Pain  Historical Provider, MD       Social History     Tobacco Use    Smoking status: Current Every Day Smoker     Packs/day: 0.50     Types: Cigarettes     Start date: 2014    Smokeless tobacco: Former User     Quit date: 2015   Vaping Use    Vaping Use: Some days   Substance Use Topics    Alcohol use: No    Drug use: Yes     Frequency: 7.0 times per week     Types: Marijuana     Comment: heroin snort and iv, LAST USED METH 10/16/2019            PHYSICAL EXAMINATION:  [ INSTRUCTIONS:  \"[x]\" Indicates a positive item  \"[]\" Indicates a negative item  -- DELETE ALL ITEMS NOT EXAMINED]  No vitals done    Constitutional: [x] Appears well-developed and well-nourished [x] No apparent distress      [] Abnormal-   Mental status  [x] Alert and awake  [x] Oriented to person/place/time [x]Able to follow commands      Eyes:  EOM    [x]  Normal  [] Abnormal-  Sclera  []  Normal  [] Abnormal -         Discharge []  None visible  [] Abnormal -  Pupils normal           Psychiatric:       [x] Normal Affect [] No Hallucinations        [] Abnormal-   Urine tox screen today  Alcohol, Urine 06/22/2021  8:35 AM Unknown   POS    Amphetamine Screen, Urine 06/22/2021  8:35 AM Unknown   NEG    Barbiturate Screen, Urine 06/22/2021  8:35 AM Unknown   NEG    Benzodiazepine Screen, Urine 06/22/2021  8:35 AM Unknown   NEG    Buprenorphine Urine 06/22/2021  8:35 AM Unknown   POS    Cocaine Metabolite Screen, Urine 06/22/2021  8:35 AM Unknown   NEG    FENTANYL SCREEN, URINE 06/22/2021  8:35 AM Unknown   NEG    Gabapentin Screen, Urine 06/22/2021  8:35 AM Unknown   N/A    MDMA, Urine 06/22/2021  8:35 AM Unknown   NEG    Methadone Screen, Urine 06/22/2021  8:35 AM Unknown   NEG    Methamphetamine, Urine 06/22/2021  8:35 AM Unknown   NEG    Opiate Scrn, Ur 06/22/2021  8:35 AM Unknown   NEG    Oxycodone Screen, Ur 06/22/2021  8:35 AM Unknown   NEG    PCP Screen, Urine 06/22/2021  8:35 AM Unknown   NEG    Propoxyphene Screen, Urine 06/22/2021  8:35 AM Unknown   N/A    Synthetic Cannabinoids (K2) Screen, Urine 06/22/2021  8:35 AM Unknown   NEG    THC Screen, Urine 06/22/2021  8:35 AM Unknown   POS    Tramadol Scrn, Ur 06/22/2021  8:35 AM Unknown   NEG    Tricyclic Antidepressants, Urine 06/22/2021  8:35 AM Unknown   N/A         Diagnosis Orders   1. Severe opioid use disorder (HCC)  POCT Rapid Drug Screen    DISCONTINUED: SUBOXONE 8-2 MG FILM SL film   2. Injury of right foot, subsequent encounter     3. Polysubstance abuse ([facility] [address]     4. Depression, unspecified depression type     5. Neuropathy     6. Encounter for monitoring Suboxone maintenance therapy     7. Chronic pain syndrome     Urine shows THC and buprenorphine and alcohol  I do not really believe it  We did an oral swab comprehensive urine  Patient became very emotional crying said he has a lot going on  His girlfriend is going to have a baby she does want to keep the baby he does  He says he has been taking Neurontin for his foot  Follow-up 1 week  --Daniela Ahumada MD on 7/4/2021 at 1:56 PM    An electronic signature was used to authenticate this note.

## 2021-06-22 NOTE — PROGRESS NOTES
Verbal order per Dr. Raeann Barker for urine drug screen. Positive for BUP THC Alcohol. Verified results with Dana Veras RN. Dr. Raeann Barker ordered Suboxone 8mg film BID for patient. Verified dose with patient. Patient was sent home with 2 day script of Suboxone 8mg film BID and will be seen back in the office 6/24/21.

## 2021-06-22 NOTE — TELEPHONE ENCOUNTER
Pt called and left a message stating his script was sent to the pharmacy incorrectly. Per Dr. Roberto Gandara-- pt is to come back in 1 week. RN placed script for Suboxone 8 mg film BID for 4 days to [facility]. RN called pt and left a message informing him that he could pick his script up on Thursday at our pharmacy to get him through until his appt.

## 2021-06-24 RX ORDER — BUPRENORPHINE HYDROCHLORIDE, NALOXONE HYDROCHLORIDE 8; 2 MG/1; MG/1
1 FILM, SOLUBLE BUCCAL; SUBLINGUAL 2 TIMES DAILY
Qty: 8 FILM | Refills: 0 | Status: SHIPPED | OUTPATIENT
Start: 2021-06-24 | End: 2021-06-28

## 2021-06-30 ENCOUNTER — OFFICE VISIT (OUTPATIENT)
Dept: INTERNAL MEDICINE CLINIC | Age: 26
End: 2021-06-30
Payer: MEDICARE

## 2021-06-30 VITALS
HEIGHT: 73 IN | HEART RATE: 104 BPM | WEIGHT: 168 LBS | DIASTOLIC BLOOD PRESSURE: 74 MMHG | BODY MASS INDEX: 22.26 KG/M2 | TEMPERATURE: 97.8 F | SYSTOLIC BLOOD PRESSURE: 115 MMHG

## 2021-06-30 DIAGNOSIS — Z51.81 ENCOUNTER FOR MONITORING SUBOXONE MAINTENANCE THERAPY: ICD-10-CM

## 2021-06-30 DIAGNOSIS — F32.A DEPRESSION, UNSPECIFIED DEPRESSION TYPE: ICD-10-CM

## 2021-06-30 DIAGNOSIS — F19.10 POLYSUBSTANCE ABUSE (HCC): ICD-10-CM

## 2021-06-30 DIAGNOSIS — Z79.899 ENCOUNTER FOR MONITORING SUBOXONE MAINTENANCE THERAPY: ICD-10-CM

## 2021-06-30 DIAGNOSIS — F11.20 SEVERE OPIOID USE DISORDER (HCC): Primary | ICD-10-CM

## 2021-06-30 DIAGNOSIS — S99.921D INJURY OF RIGHT FOOT, SUBSEQUENT ENCOUNTER: ICD-10-CM

## 2021-06-30 DIAGNOSIS — G89.4 CHRONIC PAIN SYNDROME: ICD-10-CM

## 2021-06-30 DIAGNOSIS — G62.9 NEUROPATHY: ICD-10-CM

## 2021-06-30 PROCEDURE — 99213 OFFICE O/P EST LOW 20 MIN: CPT | Performed by: INTERNAL MEDICINE

## 2021-06-30 PROCEDURE — 4004F PT TOBACCO SCREEN RCVD TLK: CPT | Performed by: INTERNAL MEDICINE

## 2021-06-30 PROCEDURE — G8427 DOCREV CUR MEDS BY ELIG CLIN: HCPCS | Performed by: INTERNAL MEDICINE

## 2021-06-30 PROCEDURE — G8420 CALC BMI NORM PARAMETERS: HCPCS | Performed by: INTERNAL MEDICINE

## 2021-06-30 PROCEDURE — 80305 DRUG TEST PRSMV DIR OPT OBS: CPT | Performed by: INTERNAL MEDICINE

## 2021-06-30 RX ORDER — BUPRENORPHINE AND NALOXONE 8; 2 MG/1; MG/1
1 FILM, SOLUBLE BUCCAL; SUBLINGUAL 2 TIMES DAILY
Qty: 14 FILM | Refills: 0 | Status: SHIPPED | OUTPATIENT
Start: 2021-06-30 | End: 2021-07-07 | Stop reason: SDUPTHER

## 2021-06-30 NOTE — PROGRESS NOTES
Verbal order per Dr. Napoleon Roberts for urine drug screen. Positive for BUP THC. Verified results with Alisa Vasquez RN. Dr. Napoleon Roberts ordered Suboxone 8mg film BID for patient. Verified dose with patient. Patient was sent home with 1 week script of Suboxone 8mg film BID and will be seen back in the office 7/7/21. UC and oral swab sent.

## 2021-07-04 NOTE — PROGRESS NOTES
2021                Drug Problem      I saw him here    He has missed several appointments since  Patient has seen Raheel Escamilla 3 times in the interim the last was 6/3  He missed appointment yesterday he said it had to do with his work  He says his grandmother  recently  He also cut his hours back at work  Urine from a visit in January showed gabapentin and Xanax     He says he has not done any Xanax but he does admit to doing gabapentin             Patient started using heroin 3 years ago  He started on pain pills in 2016     Patient uses it he began snorting it but now IV  Other drugs used: If he could not find heroin he would use anything he can get as he puts it to get out of himself  Suboxone programs in the past [facility]  He has been there a couple of years but he cannot afford it  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 Authorizing Provider   buprenorphine-naloxone (SUBOXONE) 8-2 MG FILM SL film Place 1 Film under the tongue 2 times daily for 7 days.  Yes Rosalinda Leon MD   cloNIDine (CATAPRES) 0.1 MG tablet Take 1 tablet by mouth nightly  Rosalinda Leon MD   buPROPion (WELLBUTRIN XL) 150 MG extended release tablet Take 1 tablet by mouth every morning  Rosalinda Leon MD   doxyLAMINE succinate (GNP SLEEP AID) 25 MG tablet Take 1 tablet by mouth nightly as needed for Sleep  [employer]   ibuprofen (ADVIL;MOTRIN) 200 MG tablet Take 400 mg by mouth every 6 hours as needed for Pain  Historical Provider, MD       Social History     Tobacco Use    Smoking status: Current Every Day Smoker     Packs/day: 0.50     Types: Cigarettes     Start date: 2014    Smokeless tobacco: Former User Quit date: 9/17/2015   Vaping Use    Vaping Use: Some days   Substance Use Topics    Alcohol use: No    Drug use: Yes     Frequency: 7.0 times per week     Types: Marijuana     Comment: heroin snort and iv, LAST USED METH 10/16/2019            PHYSICAL EXAMINATION:  [ INSTRUCTIONS:  \"[x]\" Indicates a positive item  \"[]\" Indicates a negative item  -- DELETE ALL ITEMS NOT EXAMINED]  No vitals done    Constitutional: [x] Appears well-developed and well-nourished [x] No apparent distress      [] Abnormal-   Mental status  [x] Alert and awake  [x] Oriented to person/place/time [x]Able to follow commands      Eyes:  EOM    [x]  Normal  [] Abnormal-  Sclera  []  Normal  [] Abnormal -         Discharge []  None visible  [] Abnormal -  Pupils normal           Psychiatric:       [x] Normal Affect [] No Hallucinations        [] Abnormal-   Urine tox screen today     Alcohol, Urine 06/30/2021 11:25 AM Unknown   NEG    Amphetamine Screen, Urine 06/30/2021 11:25 AM Unknown   NEG    Barbiturate Screen, Urine 06/30/2021 11:25 AM Unknown   NEG    Benzodiazepine Screen, Urine 06/30/2021 11:25 AM Unknown   NEG    Buprenorphine Urine 06/30/2021 11:25 AM Unknown   POS    Cocaine Metabolite Screen, Urine 06/30/2021 11:25 AM Unknown   NEG    FENTANYL SCREEN, URINE 06/30/2021 11:25 AM Unknown   NEG    Gabapentin Screen, Urine 06/30/2021 11:25 AM Unknown   N/A    MDMA, Urine 06/30/2021 11:25 AM Unknown   NEG    Methadone Screen, Urine 06/30/2021 11:25 AM Unknown   NEG    Methamphetamine, Urine 06/30/2021 11:25 AM Unknown   NEG    Opiate Scrn, Ur 06/30/2021 11:25 AM Unknown   NEG    Oxycodone Screen, Ur 06/30/2021 11:25 AM Unknown   NEG    PCP Screen, Urine 06/30/2021 11:25 AM Unknown   NEG    Propoxyphene Screen, Urine 06/30/2021 11:25 AM Unknown   N/A    Synthetic Cannabinoids (K2) Screen, Urine 06/30/2021 11:25 AM Unknown   NEG    THC Screen, Urine 06/30/2021 11:25 AM Unknown   POS    Tramadol Scrn, Ur 06/30/2021 11:25 AM Unknown   NEG Tricyclic Antidepressants, Urine 06/30/2021 11:25 AM Unknown   N/A         Diagnosis Orders   1. Severe opioid use disorder (HCC)  POCT Rapid Drug Screen    buprenorphine-naloxone (SUBOXONE) 8-2 MG FILM SL film   2. Injury of right foot, subsequent encounter     3. Polysubstance abuse ([facility] [address]     4. Encounter for monitoring Suboxone maintenance therapy     5. Neuropathy     6. Depression, unspecified depression type     7. Chronic pain syndrome     Urine shows THC and buprenorphine    I do not really believe it  We did a comprehensive urine last visit  He had alcohol high levels of Neurontin  Patient became very emotional crying said he has a lot going on  His girlfriend is going to have a baby she does want to keep the baby he does  He says he has been taking Neurontin for his foot  Follow-up 1 week  I told the patient that I can only have so many patients at one time on Suboxone by federal law (275)  I told the patient I am approaching that number  If they are not compliant with treatment, provide doctored urines, etc I told the patient I may have to let them go because I want to see new patients who are committed      --Jemma Washington MD on 7/4/2021 at 3:27 PM    An electronic signature was used to authenticate this note.

## 2021-07-07 ENCOUNTER — OFFICE VISIT (OUTPATIENT)
Dept: INTERNAL MEDICINE CLINIC | Age: 26
End: 2021-07-07
Payer: MEDICARE

## 2021-07-07 VITALS
SYSTOLIC BLOOD PRESSURE: 134 MMHG | BODY MASS INDEX: 22.26 KG/M2 | WEIGHT: 168 LBS | HEIGHT: 73 IN | HEART RATE: 92 BPM | TEMPERATURE: 98.7 F | DIASTOLIC BLOOD PRESSURE: 80 MMHG

## 2021-07-07 DIAGNOSIS — F41.9 ANXIETY: ICD-10-CM

## 2021-07-07 DIAGNOSIS — G89.4 CHRONIC PAIN SYNDROME: ICD-10-CM

## 2021-07-07 DIAGNOSIS — Z51.81 ENCOUNTER FOR MONITORING SUBOXONE MAINTENANCE THERAPY: ICD-10-CM

## 2021-07-07 DIAGNOSIS — G62.9 NEUROPATHY: ICD-10-CM

## 2021-07-07 DIAGNOSIS — Z79.899 ENCOUNTER FOR MONITORING SUBOXONE MAINTENANCE THERAPY: ICD-10-CM

## 2021-07-07 DIAGNOSIS — R82.90 ABNORMAL URINE FINDING: ICD-10-CM

## 2021-07-07 DIAGNOSIS — F19.10 POLYSUBSTANCE ABUSE (HCC): ICD-10-CM

## 2021-07-07 DIAGNOSIS — F32.A DEPRESSION, UNSPECIFIED DEPRESSION TYPE: ICD-10-CM

## 2021-07-07 DIAGNOSIS — F11.20 SEVERE OPIOID USE DISORDER (HCC): Primary | ICD-10-CM

## 2021-07-07 PROCEDURE — G8420 CALC BMI NORM PARAMETERS: HCPCS | Performed by: INTERNAL MEDICINE

## 2021-07-07 PROCEDURE — 80305 DRUG TEST PRSMV DIR OPT OBS: CPT | Performed by: INTERNAL MEDICINE

## 2021-07-07 PROCEDURE — 99213 OFFICE O/P EST LOW 20 MIN: CPT | Performed by: INTERNAL MEDICINE

## 2021-07-07 PROCEDURE — 4004F PT TOBACCO SCREEN RCVD TLK: CPT | Performed by: INTERNAL MEDICINE

## 2021-07-07 PROCEDURE — G8428 CUR MEDS NOT DOCUMENT: HCPCS | Performed by: INTERNAL MEDICINE

## 2021-07-07 RX ORDER — BUPRENORPHINE AND NALOXONE 8; 2 MG/1; MG/1
1 FILM, SOLUBLE BUCCAL; SUBLINGUAL 2 TIMES DAILY
Qty: 12 FILM | Refills: 0 | Status: SHIPPED | OUTPATIENT
Start: 2021-07-07 | End: 2021-07-13

## 2021-07-07 NOTE — PROGRESS NOTES
Sw met with patient today. Patient did expressed having a lot of added stress and having limited support. Patient did share that he used over the weekend. Patient appears to be very upset and in need of higher level of care though patient denied at this time. Patient was willing to continue to meet with sw and was referred to [facility]. Sw will have [facility] call patient after 1p today. Sw encouraged patient to focus on today and one day at a time.

## 2021-07-10 NOTE — PROGRESS NOTES
2021                Drug Problem      I saw him here    He has missed several appointments since    He missed appointment yesterday he said it had to do with his work  He says his grandmother  recently  He also cut his hours back at work  Urine from a visit in January showed gabapentin and Xanax     He says he has not done any Xanax but he does admit to doing gabapentin             Patient started using heroin 3 years ago  He started on pain pills in 2016     Patient uses it he began snorting it but now IV  Other drugs used: If he could not find heroin he would use anything he can get as he puts it to get out of himself  Suboxone programs in the past [facility]  He has been there a couple of years but he cannot afford it  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 Authorizing Provider   buprenorphine-naloxone (SUBOXONE) 8-2 MG FILM SL film Place 1 Film under the tongue 2 times daily for 6 days.  Yes Daylin Pemberton MD   cloNIDine (CATAPRES) 0.1 MG tablet Take 1 tablet by mouth nightly  Daylin Pemberton MD   buPROPion (WELLBUTRIN XL) 150 MG extended release tablet Take 1 tablet by mouth every morning  Daylin Pemberton MD   doxyLAMINE succinate (GNP SLEEP AID) 25 MG tablet Take 1 tablet by mouth nightly as needed for Sleep  [employer]   ibuprofen (ADVIL;MOTRIN) 200 MG tablet Take 400 mg by mouth every 6 hours as needed for Pain  Historical Provider, MD       Social History     Tobacco Use    Smoking status: Current Every Day Smoker     Packs/day: 0.50     Types: Cigarettes     Start date: 2014    Smokeless tobacco: Former User     Quit date: 2015   Vaping Use    Vaping Use: Some days   Substance Use Topics    Alcohol use: No    Drug use: Yes     Frequency: 7.0 times per week     Types: Marijuana     Comment: heroin snort and iv, LAST USED METH 10/16/2019            PHYSICAL EXAMINATION:  [ INSTRUCTIONS:  \"[x]\" Indicates a positive item  \"[]\" Indicates a negative item  -- DELETE ALL ITEMS NOT EXAMINED]  No vitals done    Constitutional: [x] Appears well-developed and well-nourished [x] No apparent distress      [] Abnormal-   Mental status  [x] Alert and awake  [x] Oriented to person/place/time [x]Able to follow commands      Eyes:  EOM    [x]  Normal  [] Abnormal-  Sclera  []  Normal  [] Abnormal -         Discharge []  None visible  [] Abnormal -  Pupils normal           Psychiatric:       [x] Normal Affect [] No Hallucinations        [] Abnormal-   Urine tox screen today  Alcohol, Urine 07/07/2021 11:35 AM Unknown   NEG    Amphetamine Screen, Urine 07/07/2021 11:35 AM Unknown   NEG    Barbiturate Screen, Urine 07/07/2021 11:35 AM Unknown   NEG    Benzodiazepine Screen, Urine 07/07/2021 11:35 AM Unknown   NEG    Buprenorphine Urine 07/07/2021 11:35 AM Unknown   POS    Cocaine Metabolite Screen, Urine 07/07/2021 11:35 AM Unknown   NEG    FENTANYL SCREEN, URINE 07/07/2021 11:35 AM Unknown   NEG    Gabapentin Screen, Urine 07/07/2021 11:35 AM Unknown   N/A    MDMA, Urine 07/07/2021 11:35 AM Unknown   NEG    Methadone Screen, Urine 07/07/2021 11:35 AM Unknown   NEG    Methamphetamine, Urine 07/07/2021 11:35 AM Unknown   NEG    Opiate Scrn, Ur 07/07/2021 11:35 AM Unknown   NEG    Oxycodone Screen, Ur 07/07/2021 11:35 AM Unknown   NEG    PCP Screen, Urine 07/07/2021 11:35 AM Unknown   NEG    Propoxyphene Screen, Urine 07/07/2021 11:35 AM Unknown   N/A    Synthetic Cannabinoids (K2) Screen, Urine 07/07/2021 11:35 AM Unknown   NEG    THC Screen, Urine 07/07/2021 11:35 AM Unknown   NEG    Tramadol Scrn, Ur 07/07/2021 11:35 AM Unknown   NEG    Tricyclic Antidepressants, Urine 07/07/2021 11:35 AM Unknown   N/A Diagnosis Orders   1. Severe opioid use disorder (HCC)  POCT Rapid Drug Screen    buprenorphine-naloxone (SUBOXONE) 8-2 MG FILM SL film   2. Encounter for monitoring Suboxone maintenance therapy     3. Polysubstance abuse ([facility] [address]     4. Neuropathy     5. Depression, unspecified depression type     6. Chronic pain syndrome     7. Anxiety     8. Abnormal urine finding     Urine shows THC and buprenorphine    I do not really believe it  He is acting high  He is loud and combative  Send out from last visit showed amphetamines methamphetamines gabapentin THC on his oral swab  Urine send out showed only THC  Obviously he gave me a fake sample  I told the patient that I can only have so many patients at one time on Suboxone by federal law (275)  I told the patient I am approaching that number  If they are not compliant with treatment, provide doctored urines, etc I told the patient I may have to let them go because I want to see new patients who are committed    I told him this is his last chance  Follow-up 7/12    --Nieves Doss MD on 7/10/2021 at 1:45 PM    An electronic signature was used to authenticate this note.

## 2021-07-15 ENCOUNTER — OFFICE VISIT (OUTPATIENT)
Dept: INTERNAL MEDICINE CLINIC | Age: 26
End: 2021-07-15
Payer: MEDICARE

## 2021-07-15 VITALS
HEIGHT: 73 IN | HEART RATE: 97 BPM | SYSTOLIC BLOOD PRESSURE: 135 MMHG | DIASTOLIC BLOOD PRESSURE: 83 MMHG | BODY MASS INDEX: 22.85 KG/M2 | TEMPERATURE: 97.5 F | WEIGHT: 172.4 LBS

## 2021-07-15 DIAGNOSIS — R82.90 ABNORMAL URINE FINDING: ICD-10-CM

## 2021-07-15 DIAGNOSIS — F11.20 SEVERE OPIOID USE DISORDER (HCC): Primary | ICD-10-CM

## 2021-07-15 DIAGNOSIS — Z51.81 ENCOUNTER FOR MONITORING SUBOXONE MAINTENANCE THERAPY: ICD-10-CM

## 2021-07-15 DIAGNOSIS — F19.10 POLYSUBSTANCE ABUSE (HCC): ICD-10-CM

## 2021-07-15 DIAGNOSIS — G62.9 NEUROPATHY: ICD-10-CM

## 2021-07-15 DIAGNOSIS — G89.4 CHRONIC PAIN SYNDROME: ICD-10-CM

## 2021-07-15 DIAGNOSIS — Z79.899 ENCOUNTER FOR MONITORING SUBOXONE MAINTENANCE THERAPY: ICD-10-CM

## 2021-07-15 DIAGNOSIS — F41.9 ANXIETY: ICD-10-CM

## 2021-07-15 DIAGNOSIS — F32.A DEPRESSION, UNSPECIFIED DEPRESSION TYPE: ICD-10-CM

## 2021-07-15 PROCEDURE — G8420 CALC BMI NORM PARAMETERS: HCPCS | Performed by: INTERNAL MEDICINE

## 2021-07-15 PROCEDURE — 4004F PT TOBACCO SCREEN RCVD TLK: CPT | Performed by: INTERNAL MEDICINE

## 2021-07-15 PROCEDURE — G8427 DOCREV CUR MEDS BY ELIG CLIN: HCPCS | Performed by: INTERNAL MEDICINE

## 2021-07-15 PROCEDURE — 80305 DRUG TEST PRSMV DIR OPT OBS: CPT | Performed by: INTERNAL MEDICINE

## 2021-07-15 PROCEDURE — 99213 OFFICE O/P EST LOW 20 MIN: CPT | Performed by: INTERNAL MEDICINE

## 2021-07-15 RX ORDER — BUPRENORPHINE AND NALOXONE 8; 2 MG/1; MG/1
1 FILM, SOLUBLE BUCCAL; SUBLINGUAL 2 TIMES DAILY
Qty: 14 FILM | Refills: 0 | Status: SHIPPED | OUTPATIENT
Start: 2021-07-15 | End: 2021-07-22

## 2021-07-15 NOTE — PROGRESS NOTES
7/15/2021                Drug Problem      I saw him here    He missed an appointment here Monday  He said he did half a Xanax because he ran out of Suboxone    He missed appointment yesterday he said it had to do with his work  He says his grandmother  recently  He also cut his hours back at work  Urine from a visit in January showed gabapentin and Xanax     He says he has not done any Xanax but he does admit to doing gabapentin             Patient started using heroin 3 years ago  He started on pain pills in 2016     Patient uses it he began snorting it but now IV  Other drugs used: If he could not find heroin he would use anything he can get as he puts it to get out of himself  Suboxone programs in the past [facility] in [place]  He has been there a couple of years but he cannot afford it  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 Authorizing Provider   buprenorphine-naloxone (SUBOXONE) 8-2 MG FILM SL film Place 1 Film under the tongue 2 times daily for 7 days.  Yes Amira Moreland MD   cloNIDine (CATAPRES) 0.1 MG tablet Take 1 tablet by mouth nightly Yes Amira Moreland MD   buPROPion (WELLBUTRIN XL) 150 MG extended release tablet Take 1 tablet by mouth every morning Yes Amira Moreland MD   doxyLAMINE succinate (GNP SLEEP AID) 25 MG tablet Take 1 tablet by mouth nightly as needed for Sleep Yes Leroy Sawyer PA-C   ibuprofen (ADVIL;MOTRIN) 200 MG tablet Take 400 mg by mouth every 6 hours as needed for Pain Yes Historical Provider, MD       Social History     Tobacco Use    Smoking status: Current Every Day Smoker     Packs/day: 0.50     Types: Cigarettes     Start date: 2014    Smokeless tobacco: Former User     Quit date: 9/17/2015   Vaping Use    Vaping Use: Some days   Substance Use Topics    Alcohol use: No    Drug use: Yes     Frequency: 7.0 times per week     Types: Marijuana     Comment: heroin snort and iv, LAST USED METH 10/16/2019            PHYSICAL EXAMINATION:  [ INSTRUCTIONS:  \"[x]\" Indicates a positive item  \"[]\" Indicates a negative item  -- DELETE ALL ITEMS NOT EXAMINED]  No vitals done    Constitutional: [x] Appears well-developed and well-nourished [x] No apparent distress      [] Abnormal-   Mental status  [x] Alert and awake  [x] Oriented to person/place/time [x]Able to follow commands      Eyes:  EOM    [x]  Normal  [] Abnormal-  Sclera  []  Normal  [] Abnormal -         Discharge []  None visible  [] Abnormal -  Pupils normal           Psychiatric:       [x] Normal Affect [] No Hallucinations        [] Abnormal-   Urine tox screen today  Alcohol, Urine 07/15/2021  2:39 PM Unknown   POS    Amphetamine Screen, Urine 07/15/2021  2:39 PM Unknown   POS    Barbiturate Screen, Urine 07/15/2021  2:39 PM Unknown   NEG    Benzodiazepine Screen, Urine 07/15/2021  2:39 PM Unknown   POS    Buprenorphine Urine 07/15/2021  2:39 PM Unknown   POS    Cocaine Metabolite Screen, Urine 07/15/2021  2:39 PM Unknown   NEG    FENTANYL SCREEN, URINE 07/15/2021  2:39 PM Unknown   NEG    Gabapentin Screen, Urine 07/15/2021  2:39 PM Unknown   N/A    MDMA, Urine 07/15/2021  2:39 PM Unknown   NEG    Methadone Screen, Urine 07/15/2021  2:39 PM Unknown   NEG    Methamphetamine, Urine 07/15/2021  2:39 PM Unknown   NEG    Opiate Scrn, Ur 07/15/2021  2:39 PM Unknown   NEG    Oxycodone Screen, Ur 07/15/2021  2:39 PM Unknown   NEG    PCP Screen, Urine 07/15/2021  2:39 PM Unknown   NEG    Propoxyphene Screen, Urine 07/15/2021  2:39 PM Unknown   N/A    Synthetic Cannabinoids (K2) Screen, Urine 07/15/2021  2:39 PM Unknown   NEG    THC Screen, Urine 07/15/2021  2:39 PM Unknown   POS    Tramadol Scrn, Ur 07/15/2021  2:39 PM Unknown   NEG Tricyclic Antidepressants, Urine 07/15/2021  2:39 PM Unknown   N/A         Diagnosis Orders   1. Severe opioid use disorder (HCC)  POCT Rapid Drug Screen    buprenorphine-naloxone (SUBOXONE) 8-2 MG FILM SL film   2. Encounter for monitoring Suboxone maintenance therapy     3. Neuropathy     4. Polysubstance abuse ([facility] [address]     5. Depression, unspecified depression type     6. Chronic pain syndrome     7. Anxiety     8. Abnormal urine finding     Urine shows THC and buprenorphine    I do not really believe it  He has alcohol buprenorphine THC and amphetamine  I told the patient that I can only have so many patients at one time on Suboxone by federal law (275)  I told the patient I am approaching that number  If they are not compliant with treatment, provide doctored urines, etc I told the patient I may have to let them go because I want to see new patients who are committed    I told him this is his last chance  Follow-up 7/19    --Aisha Larsen MD on 7/17/2021 at 1:21 PM    An electronic signature was used to authenticate this note.

## 2021-07-15 NOTE — PROGRESS NOTES
Verbal order per Dr. Garcia for urine drug screen. Positive for BUP BZO AMP Alcohol Verified results with Mahnaz IBANEZ LPN. Dr. Garcia ordered Suboxone 8mg fiilm BID  for patient. Verified dose with patient. Patient was sent home with 1 week script of Suboxone 8mg film BID and will be seen back in the office 7/22/21.

## 2021-07-26 ENCOUNTER — OFFICE VISIT (OUTPATIENT)
Dept: INTERNAL MEDICINE CLINIC | Age: 26
End: 2021-07-26
Payer: MEDICARE

## 2021-07-26 VITALS
SYSTOLIC BLOOD PRESSURE: 137 MMHG | HEART RATE: 103 BPM | WEIGHT: 183 LBS | DIASTOLIC BLOOD PRESSURE: 72 MMHG | HEIGHT: 73 IN | BODY MASS INDEX: 24.25 KG/M2 | TEMPERATURE: 96.6 F

## 2021-07-26 DIAGNOSIS — G62.9 NEUROPATHY: ICD-10-CM

## 2021-07-26 DIAGNOSIS — G89.4 CHRONIC PAIN SYNDROME: ICD-10-CM

## 2021-07-26 DIAGNOSIS — F41.9 ANXIETY: ICD-10-CM

## 2021-07-26 DIAGNOSIS — Z79.899 ENCOUNTER FOR MONITORING SUBOXONE MAINTENANCE THERAPY: ICD-10-CM

## 2021-07-26 DIAGNOSIS — Z51.81 ENCOUNTER FOR MONITORING SUBOXONE MAINTENANCE THERAPY: ICD-10-CM

## 2021-07-26 DIAGNOSIS — F11.20 SEVERE OPIOID USE DISORDER (HCC): Primary | ICD-10-CM

## 2021-07-26 DIAGNOSIS — F32.A DEPRESSION, UNSPECIFIED DEPRESSION TYPE: ICD-10-CM

## 2021-07-26 DIAGNOSIS — F19.10 POLYSUBSTANCE ABUSE (HCC): ICD-10-CM

## 2021-07-26 PROCEDURE — 99213 OFFICE O/P EST LOW 20 MIN: CPT | Performed by: INTERNAL MEDICINE

## 2021-07-26 PROCEDURE — G8420 CALC BMI NORM PARAMETERS: HCPCS | Performed by: INTERNAL MEDICINE

## 2021-07-26 PROCEDURE — 80305 DRUG TEST PRSMV DIR OPT OBS: CPT | Performed by: INTERNAL MEDICINE

## 2021-07-26 PROCEDURE — 4004F PT TOBACCO SCREEN RCVD TLK: CPT | Performed by: INTERNAL MEDICINE

## 2021-07-26 PROCEDURE — G8428 CUR MEDS NOT DOCUMENT: HCPCS | Performed by: INTERNAL MEDICINE

## 2021-07-26 RX ORDER — BUPRENORPHINE AND NALOXONE 8; 2 MG/1; MG/1
1 FILM, SOLUBLE BUCCAL; SUBLINGUAL 2 TIMES DAILY
Qty: 8 FILM | Refills: 0 | Status: SHIPPED | OUTPATIENT
Start: 2021-07-26 | End: 2021-07-29 | Stop reason: SDUPTHER

## 2021-07-26 NOTE — PROGRESS NOTES
2021                Drug Problem      I saw him here  7/15  He missed an appointment here last week  He tried to make his Suboxone last but he has not had any in 4 days  He said yesterday to try to help things he took a Xanax      He missed appointment yesterday he said it had to do with his work  He says his grandmother  recently  He also cut his hours back at work  Urine from a visit in January showed gabapentin and Xanax     He says he has not done any Xanax but he does admit to doing gabapentin             Patient started using heroin 3 years ago  He started on pain pills in 2016     Patient uses it he began snorting it but now IV  Other drugs used: If he could not find heroin he would use anything he can get as he puts it to get out of himself  Suboxone programs in the past [facility] in [place]  He has been there a couple of years but he cannot afford it  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 Authorizing Provider   buprenorphine-naloxone (SUBOXONE) 8-2 MG FILM SL film Place 1 Film under the tongue 2 times daily for 4 days.  Yes J Luis Alejandra MD   cloNIDine (CATAPRES) 0.1 MG tablet Take 1 tablet by mouth nightly  J Luis Alejandra MD   buPROPion (WELLBUTRIN XL) 150 MG extended release tablet Take 1 tablet by mouth every morning  J Luis Alejandra MD   doxyLAMINE succinate (GNP SLEEP AID) 25 MG tablet Take 1 tablet by mouth nightly as needed for Sleep  Castro Beard PA-C   ibuprofen (ADVIL;MOTRIN) 200 MG tablet Take 400 mg by mouth every 6 hours as needed for Pain  Historical Provider, MD       Social History     Tobacco Use    Smoking status: Current Every Day Smoker     Packs/day: 0.50     Types: Cigarettes     Start date: 9/17/2014    Smokeless tobacco: Former User     Quit date: 9/17/2015   Vaping Use    Vaping Use: Some days   Substance Use Topics    Alcohol use: No    Drug use: Yes     Frequency: 7.0 times per week     Types: Marijuana     Comment: heroin snort and iv, LAST USED METH 10/16/2019            PHYSICAL EXAMINATION:  [ INSTRUCTIONS:  \"[x]\" Indicates a positive item  \"[]\" Indicates a negative item  -- DELETE ALL ITEMS NOT EXAMINED]  No vitals done    Constitutional: [x] Appears well-developed and well-nourished [x] No apparent distress      [] Abnormal-   Mental status  [x] Alert and awake  [x] Oriented to person/place/time [x]Able to follow commands      Eyes:  EOM    [x]  Normal  [] Abnormal-  Sclera  []  Normal  [] Abnormal -         Discharge []  None visible  [] Abnormal -  Pupils normal           Psychiatric:       [x] Normal Affect [] No Hallucinations        [] Abnormal-   Urine tox screen today     Collected Lab    Alcohol, Urine 07/26/2021 12:48 PM Unknown   POS    Amphetamine Screen, Urine 07/26/2021 12:48 PM Unknown   NEG    Barbiturate Screen, Urine 07/26/2021 12:48 PM Unknown   NEG    Benzodiazepine Screen, Urine 07/26/2021 12:48 PM Unknown   POS    Buprenorphine Urine 07/26/2021 12:48 PM Unknown   POS    Cocaine Metabolite Screen, Urine 07/26/2021 12:48 PM Unknown   NEG    FENTANYL SCREEN, URINE 07/26/2021 12:48 PM Unknown   NEG    Gabapentin Screen, Urine 07/26/2021 12:48 PM Unknown   N/A    MDMA, Urine 07/26/2021 12:48 PM Unknown   NEG    Methadone Screen, Urine 07/26/2021 12:48 PM Unknown   NEG    Methamphetamine, Urine 07/26/2021 12:48 PM Unknown   NEG    Opiate Scrn, Ur 07/26/2021 12:48 PM Unknown   NEG    Oxycodone Screen, Ur 07/26/2021 12:48 PM Unknown   NEG    PCP Screen, Urine 07/26/2021 12:48 PM Unknown   NEG    Propoxyphene Screen, Urine 07/26/2021 12:48 PM Unknown   N/A    Synthetic Cannabinoids (K2) Screen, Urine 07/26/2021 12:48 PM Unknown   NEG    THC Screen, Urine 07/26/2021 12:48 PM Unknown   POS    Tramadol Scrn, Ur 07/26/2021 12:48 PM Unknown   NEG    Tricyclic Antidepressants, Urine 07/26/2021 12:48 PM Unknown   N/A         Diagnosis Orders   1. Severe opioid use disorder (HCC)  POCT Rapid Drug Screen    buprenorphine-naloxone (SUBOXONE) 8-2 MG FILM SL film   2. Encounter for monitoring Suboxone maintenance therapy     3. Polysubstance abuse ([facility] [address]     4. Neuropathy     5. Chronic pain syndrome     6. Depression, unspecified depression type     7. Anxiety     Urine shows THC and buprenorphine amphetamines and benzos    I reviewed the [place] Autom[facility] Reporting [facility] report     There does not appear to be any discrepancies or overprescribing of controlled substances  He admitted to benzos I think he is also taking Adderall  I told the patient that I can only have so many patients at one time on Suboxone by federal law (275)  I told the patient I am approaching that number  If they are not compliant with treatment, provide doctored urines, etc I told the patient I may have to let them go because I want to see new patients who are committed    Follow-up here 7/29  I told  patient to bring his girlfriend next time apparently she is pregnant with his baby    --Kenney Isbell MD on 7/26/2021 at 3:33 PM    An electronic signature was used to authenticate this note.

## 2021-07-26 NOTE — PROGRESS NOTES
Verbal order per Dr. Jonelle Chris for urine drug screen. Positive for BUP BZO THC Alcohol Verified results with Mahnaz IBANEZ LPN. Dr. Jonelle Chris ordered Suboxone 8mg film BID for patient. Verified dose with patient. Patient was sent home with 4 day script of Suboxone 8mg film BID and will be seen back in the office 7/29/21.

## 2021-07-26 NOTE — PROGRESS NOTES
Patient came to the office today. Sw met with patient and discussed that he needing to comply with treatment. Patient expressed being stressed with having a baby on the way. Patient was thankful to be put on the schedule to see Dr. Charbel Calle today since he missed his appointment last week. Patient stayed in office with sw until his appointment due to knowing if he went home he wouldn't return. Sw offered support and will inform [facility] to reach out to patient.

## 2021-07-29 ENCOUNTER — OFFICE VISIT (OUTPATIENT)
Dept: INTERNAL MEDICINE CLINIC | Age: 26
End: 2021-07-29
Payer: MEDICARE

## 2021-07-29 VITALS
WEIGHT: 179 LBS | SYSTOLIC BLOOD PRESSURE: 143 MMHG | HEART RATE: 96 BPM | DIASTOLIC BLOOD PRESSURE: 95 MMHG | BODY MASS INDEX: 23.72 KG/M2 | HEIGHT: 73 IN | TEMPERATURE: 98.6 F

## 2021-07-29 DIAGNOSIS — F11.20 SEVERE OPIOID USE DISORDER (HCC): Primary | ICD-10-CM

## 2021-07-29 DIAGNOSIS — Z51.81 ENCOUNTER FOR MONITORING SUBOXONE MAINTENANCE THERAPY: ICD-10-CM

## 2021-07-29 DIAGNOSIS — F19.10 POLYSUBSTANCE ABUSE (HCC): ICD-10-CM

## 2021-07-29 DIAGNOSIS — F32.A DEPRESSION, UNSPECIFIED DEPRESSION TYPE: ICD-10-CM

## 2021-07-29 DIAGNOSIS — G89.4 CHRONIC PAIN SYNDROME: ICD-10-CM

## 2021-07-29 DIAGNOSIS — Z79.899 ENCOUNTER FOR MONITORING SUBOXONE MAINTENANCE THERAPY: ICD-10-CM

## 2021-07-29 DIAGNOSIS — F41.9 ANXIETY: ICD-10-CM

## 2021-07-29 DIAGNOSIS — G62.9 NEUROPATHY: ICD-10-CM

## 2021-07-29 PROCEDURE — G8428 CUR MEDS NOT DOCUMENT: HCPCS | Performed by: INTERNAL MEDICINE

## 2021-07-29 PROCEDURE — G8420 CALC BMI NORM PARAMETERS: HCPCS | Performed by: INTERNAL MEDICINE

## 2021-07-29 PROCEDURE — 4004F PT TOBACCO SCREEN RCVD TLK: CPT | Performed by: INTERNAL MEDICINE

## 2021-07-29 PROCEDURE — 80305 DRUG TEST PRSMV DIR OPT OBS: CPT | Performed by: INTERNAL MEDICINE

## 2021-07-29 PROCEDURE — 99213 OFFICE O/P EST LOW 20 MIN: CPT | Performed by: INTERNAL MEDICINE

## 2021-07-29 RX ORDER — BUPRENORPHINE AND NALOXONE 8; 2 MG/1; MG/1
1 FILM, SOLUBLE BUCCAL; SUBLINGUAL 2 TIMES DAILY
Qty: 14 FILM | Refills: 0 | Status: SHIPPED | OUTPATIENT
Start: 2021-07-29 | End: 2021-08-05 | Stop reason: SDUPTHER

## 2021-07-29 NOTE — PROGRESS NOTES
Verbal order per Dr. Anjel Perkins for urine drug screen. Positive for BUP AMP BZO ETG Verified results with Mahnaz BRANNON LPN. Dr. Anjel Perkins ordered Suboxone 8mg film BID  for patient. Verified dose with patient. Patient was sent home with 1 week script of Suboxone 8mg film BID and will be seen back in the office 8/5/21.

## 2021-07-29 NOTE — PROGRESS NOTES
2021                Drug Problem      I saw him here    there is a question today whether he went outside and used with another one of my patients  He missed an appointment here last week  He tried to make his Suboxone last but he has not had any in 4 days  He said yesterday to try to help things he took a Xanax      He missed appointment yesterday he said it had to do with his work  He says his grandmother  recently  He also cut his hours back at work  Urine from a visit in January showed gabapentin and Xanax     He says he has not done any Xanax but he does admit to doing gabapentin             Patient started using heroin 3 years ago  He started on pain pills in 2016     Patient uses it he began snorting it but now IV  Other drugs used: If he could not find heroin he would use anything he can get as he puts it to get out of himself  Suboxone programs in the past [facility] in [place]  He has been there a couple of years but he cannot afford it  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 Authorizing Provider   buprenorphine-naloxone (SUBOXONE) 8-2 MG FILM SL film Place 1 Film under the tongue 2 times daily for 7 days.  Yes Meño Galan MD   cloNIDine (CATAPRES) 0.1 MG tablet Take 1 tablet by mouth nightly  Meño Galan MD   buPROPion (WELLBUTRIN XL) 150 MG extended release tablet Take 1 tablet by mouth every morning  Meño Galan MD   doxyLAMINE succinate (GNP SLEEP AID) 25 MG tablet Take 1 tablet by mouth nightly as needed for Sleep  [employer]   ibuprofen (ADVIL;MOTRIN) 200 MG tablet Take 400 mg by mouth every 6 hours as needed for Pain  Historical Provider, MD       Social History     Tobacco Use    Smoking status: Current Every Day Smoker     Packs/day: 0.50     Types: Cigarettes     Start date: 9/17/2014    Smokeless tobacco: Former User     Quit date: 9/17/2015   Vaping Use    Vaping Use: Some days   Substance Use Topics    Alcohol use: No    Drug use: Yes     Frequency: 7.0 times per week     Types: Marijuana     Comment: heroin snort and iv, LAST USED METH 10/16/2019            PHYSICAL EXAMINATION:  [ INSTRUCTIONS:  \"[x]\" Indicates a positive item  \"[]\" Indicates a negative item  -- DELETE ALL ITEMS NOT EXAMINED]  No vitals done    Constitutional: [x] Appears well-developed and well-nourished [x] No apparent distress      [] Abnormal-   Mental status  [x] Alert and awake  [x] Oriented to person/place/time [x]Able to follow commands      Eyes:  EOM    [x]  Normal  [] Abnormal-  Sclera  []  Normal  [] Abnormal -         Discharge []  None visible  [] Abnormal -  Pupils normal           Psychiatric:       [x] Normal Affect [] No Hallucinations        [] Abnormal-   Urine tox screen today      Collected Lab   Alcohol, Urine 07/29/2021  1:59 PM Unknown   POS    Amphetamine Screen, Urine 07/29/2021  1:59 PM Unknown   NEG    Barbiturate Screen, Urine 07/29/2021  1:59 PM Unknown   NEG    Benzodiazepine Screen, Urine 07/29/2021  1:59 PM Unknown   POS    Buprenorphine Urine 07/29/2021  1:59 PM Unknown   POS    Cocaine Metabolite Screen, Urine 07/29/2021  1:59 PM Unknown   NEG    FENTANYL SCREEN, URINE 07/29/2021  1:59 PM Unknown   NEG    Gabapentin Screen, Urine 07/29/2021  1:59 PM Unknown   N/A    MDMA, Urine 07/29/2021  1:59 PM Unknown   NEG    Methadone Screen, Urine 07/29/2021  1:59 PM Unknown   NEG    Methamphetamine, Urine 07/29/2021  1:59 PM Unknown   NEG    Opiate Scrn, Ur 07/29/2021  1:59 PM Unknown   NEG    Oxycodone Screen, Ur 07/29/2021  1:59 PM Unknown   NEG    PCP Screen, Urine 07/29/2021  1:59 PM Unknown   NEG    Propoxyphene Screen, Urine 07/29/2021  1:59 PM Unknown   N/A    Synthetic Cannabinoids (K2) Screen, Urine 07/29/2021  1:59 PM Unknown   NEG    THC Screen, Urine 07/29/2021  1:59 PM Unknown   POS    Tramadol Scrn, Ur 07/29/2021  1:59 PM Unknown   NEG    Tricyclic Antidepressants, Urine 07/29/2021  1:59 PM Unknown   N/A         Diagnosis Orders   1. Severe opioid use disorder (HCC)  POCT Rapid Drug Screen    buprenorphine-naloxone (SUBOXONE) 8-2 MG FILM SL film   2. Encounter for monitoring Suboxone maintenance therapy     3. Neuropathy     4. Polysubstance abuse ([facility] [address]     5. Chronic pain syndrome     6. Depression, unspecified depression type     7. Anxiety     Urine shows THC and buprenorphine alcohol and benzos  we FaceTimed his girlfriend I think she needs to come in with him  she is pregnant with his child  I reviewed the [address] [place] [address] Automated Rx Reporting System report     There does not appear to be any discrepancies or overprescribing of controlled substances  He admitted to benzos I think he is also taking Adderall  I told the patient that I can only have so many patients at one time on Suboxone by federal law (275)  I told the patient I am approaching that number  If they are not compliant with treatment, provide doctored urines, etc I told the patient I may have to let them go because I want to see new patients who are committed    Follow-up here 8/5 his girlfriend said she would be there    --Henrry Christian MD on 7/31/2021 at 3:55 PM    An electronic signature was used to authenticate this note.

## 2021-08-05 ENCOUNTER — OFFICE VISIT (OUTPATIENT)
Dept: INTERNAL MEDICINE CLINIC | Age: 26
End: 2021-08-05
Payer: MEDICARE

## 2021-08-05 VITALS
HEIGHT: 73 IN | DIASTOLIC BLOOD PRESSURE: 92 MMHG | BODY MASS INDEX: 23.86 KG/M2 | TEMPERATURE: 97.9 F | HEART RATE: 105 BPM | WEIGHT: 180 LBS | SYSTOLIC BLOOD PRESSURE: 152 MMHG

## 2021-08-05 DIAGNOSIS — Z79.899 ENCOUNTER FOR MONITORING SUBOXONE MAINTENANCE THERAPY: ICD-10-CM

## 2021-08-05 DIAGNOSIS — F19.10 POLYSUBSTANCE ABUSE (HCC): ICD-10-CM

## 2021-08-05 DIAGNOSIS — G62.9 NEUROPATHY: ICD-10-CM

## 2021-08-05 DIAGNOSIS — Z51.81 ENCOUNTER FOR MONITORING SUBOXONE MAINTENANCE THERAPY: ICD-10-CM

## 2021-08-05 DIAGNOSIS — F32.A DEPRESSION, UNSPECIFIED DEPRESSION TYPE: ICD-10-CM

## 2021-08-05 DIAGNOSIS — G89.4 CHRONIC PAIN SYNDROME: ICD-10-CM

## 2021-08-05 DIAGNOSIS — F41.9 ANXIETY: ICD-10-CM

## 2021-08-05 DIAGNOSIS — F11.20 SEVERE OPIOID USE DISORDER (HCC): Primary | ICD-10-CM

## 2021-08-05 PROCEDURE — 80305 DRUG TEST PRSMV DIR OPT OBS: CPT | Performed by: INTERNAL MEDICINE

## 2021-08-05 PROCEDURE — 99213 OFFICE O/P EST LOW 20 MIN: CPT | Performed by: INTERNAL MEDICINE

## 2021-08-05 PROCEDURE — G8428 CUR MEDS NOT DOCUMENT: HCPCS | Performed by: INTERNAL MEDICINE

## 2021-08-05 PROCEDURE — G8420 CALC BMI NORM PARAMETERS: HCPCS | Performed by: INTERNAL MEDICINE

## 2021-08-05 PROCEDURE — 4004F PT TOBACCO SCREEN RCVD TLK: CPT | Performed by: INTERNAL MEDICINE

## 2021-08-05 RX ORDER — BUPRENORPHINE AND NALOXONE 8; 2 MG/1; MG/1
1 FILM, SOLUBLE BUCCAL; SUBLINGUAL 2 TIMES DAILY
Qty: 14 FILM | Refills: 0 | Status: SHIPPED | OUTPATIENT
Start: 2021-08-05 | End: 2021-08-12

## 2021-08-05 NOTE — PROGRESS NOTES
Verbal order per Dr. Angel Kate for urine drug screen. Positive for BUP ETG THC. Verified results with Silvio Gannon. Dr. Angel Kate ordered Suboxone 8mg film BID  for patient. Verified dose with patient. Patient was sent home with 1 week script of Suboxone 8mg film BID and will be seen back in the office 8/12/21.

## 2021-08-05 NOTE — PROGRESS NOTES
8/5/2021                Drug Problem      I saw him here  7/29  Patient did bring his girlfriend with him today  He complains of a foot problem but will not go see the podiatrist  He says he takes Neurontin off the street  He also has a problem with benzos  I wanted to have his girlfriend come in to see the dynamics between them  She is pregnant with his child  Apparently she does not get along with his parents and he does not get along with her parents  Her parents recently            He says he has not done any Xanax but he does admit to doing gabapentin             Patient started using heroin 3 years ago  He started on pain pills in 2016     Patient uses it he began snorting it but now IV  Other drugs used: If he could not find heroin he would use anything he can get as he puts it to get out of himself  Suboxone programs in the past [facility]  He has been there a couple of years but he cannot afford it  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 Authorizing Provider   buprenorphine-naloxone (SUBOXONE) 8-2 MG FILM SL film Place 1 Film under the tongue 2 times daily for 7 days.  Yes Rosalinda Leon MD   cloNIDine (CATAPRES) 0.1 MG tablet Take 1 tablet by mouth nightly  Rosalinda Leon MD   buPROPion (WELLBUTRIN XL) 150 MG extended release tablet Take 1 tablet by mouth every morning  Rosalinda Leon MD   doxyLAMINE succinate (GNP SLEEP AID) 25 MG tablet Take 1 tablet by mouth nightly as needed for Sleep  [employer], PA-C   ibuprofen (ADVIL;MOTRIN) 200 MG tablet Take 400 mg by mouth every 6 hours as needed for Pain  Historical Provider, MD       Social History     Tobacco Use    Smoking status: Current Every Day NEG    THC Screen, Urine 08/05/2021  8:55 AM Unknown   POS    Tramadol Scrn, Ur 08/05/2021  8:55 AM Unknown   NEG    Tricyclic Antidepressants, Urine 08/05/2021  8:55 AM Unknown   N/A         Diagnosis Orders   1. Severe opioid use disorder (HCC)  POCT Rapid Drug Screen    buprenorphine-naloxone (SUBOXONE) 8-2 MG FILM SL film   2. Encounter for monitoring Suboxone maintenance therapy     3. Polysubstance abuse ([address]     4. Neuropathy     5. Chronic pain syndrome     6. Depression, unspecified depression type     7.  Anxiety     I had a 30-minute discussion with the patient and his girlfriend  In questioning him further I think alcohol may be a problem for him as well  His girlfriend thinks it is  He she says he drinks at least two tall highballs a night may be more  I told him to go see the podiatrist if he gets Neurontin prescribed I do not have a problem with it  The plan will be to give him Suboxone  Follow-up 1 week  I do recommend couples counseling for the patient and his girlfriend  They both think this might be a long-term relationship but they realize that they are going to be connected for life as they're having a baby together  I reviewed the [place] Automated Rx Reporting System report     There does not appear to be any discrepancies or overprescribing of controlled substances

## 2021-08-13 ENCOUNTER — TELEPHONE (OUTPATIENT)
Dept: INTERNAL MEDICINE CLINIC | Age: 26
End: 2021-08-13

## 2021-08-13 NOTE — TELEPHONE ENCOUNTER
Pt missed his appointment yesterday and was trying to come in today. Pt stated he had went to a wedding that 50 miles away from [facility] and has been around people with covid and feels he may have it and he is having symptoms. He was told he that he needed to get tested and we needed a positive result faxed to our office. Pt stated he would get a test done as well as try to get a ride here. he was also given the option to come in on Monday if he could not make it today. Pt did not want to relapse. I told the pt he could come in today and he needed to be here before 11 as our office closes at 15. He then repeated he was 50 miles away and would need to get a ride and he probably wouldn't make it.

## 2021-08-16 ENCOUNTER — OFFICE VISIT (OUTPATIENT)
Dept: INTERNAL MEDICINE CLINIC | Age: 26
End: 2021-08-16
Payer: MEDICARE

## 2021-08-16 VITALS
TEMPERATURE: 97.6 F | DIASTOLIC BLOOD PRESSURE: 74 MMHG | HEART RATE: 111 BPM | SYSTOLIC BLOOD PRESSURE: 136 MMHG | BODY MASS INDEX: 23.59 KG/M2 | WEIGHT: 178 LBS | HEIGHT: 73 IN

## 2021-08-16 DIAGNOSIS — G62.9 NEUROPATHY: ICD-10-CM

## 2021-08-16 DIAGNOSIS — F19.10 POLYSUBSTANCE ABUSE (HCC): ICD-10-CM

## 2021-08-16 DIAGNOSIS — G89.4 CHRONIC PAIN SYNDROME: ICD-10-CM

## 2021-08-16 DIAGNOSIS — Z79.899 ENCOUNTER FOR MONITORING SUBOXONE MAINTENANCE THERAPY: ICD-10-CM

## 2021-08-16 DIAGNOSIS — F41.9 ANXIETY: ICD-10-CM

## 2021-08-16 DIAGNOSIS — Z51.81 ENCOUNTER FOR MONITORING SUBOXONE MAINTENANCE THERAPY: ICD-10-CM

## 2021-08-16 DIAGNOSIS — Z11.59 SCREENING FOR VIRAL DISEASE: ICD-10-CM

## 2021-08-16 DIAGNOSIS — F11.20 SEVERE OPIOID USE DISORDER (HCC): Primary | ICD-10-CM

## 2021-08-16 PROCEDURE — G8420 CALC BMI NORM PARAMETERS: HCPCS | Performed by: INTERNAL MEDICINE

## 2021-08-16 PROCEDURE — G8428 CUR MEDS NOT DOCUMENT: HCPCS | Performed by: INTERNAL MEDICINE

## 2021-08-16 PROCEDURE — 80305 DRUG TEST PRSMV DIR OPT OBS: CPT | Performed by: INTERNAL MEDICINE

## 2021-08-16 PROCEDURE — 4004F PT TOBACCO SCREEN RCVD TLK: CPT | Performed by: INTERNAL MEDICINE

## 2021-08-16 PROCEDURE — 99213 OFFICE O/P EST LOW 20 MIN: CPT | Performed by: INTERNAL MEDICINE

## 2021-08-16 RX ORDER — BUPRENORPHINE AND NALOXONE 8; 2 MG/1; MG/1
1 FILM, SOLUBLE BUCCAL; SUBLINGUAL 2 TIMES DAILY
Qty: 8 FILM | Refills: 0 | Status: SHIPPED | OUTPATIENT
Start: 2021-08-16 | End: 2021-08-19 | Stop reason: SDUPTHER

## 2021-08-16 RX ORDER — BUPRENORPHINE AND NALOXONE 8; 2 MG/1; MG/1
1 FILM, SOLUBLE BUCCAL; SUBLINGUAL ONCE
Status: COMPLETED | OUTPATIENT
Start: 2021-08-16 | End: 2021-08-16

## 2021-08-16 RX ADMIN — BUPRENORPHINE AND NALOXONE 1 FILM: 8; 2 FILM, SOLUBLE BUCCAL; SUBLINGUAL at 15:57

## 2021-08-16 NOTE — PROGRESS NOTES
Sw met with patient today. Patient continues to struggle with his sobriety. Patient reports that he is having twins now, ( Girlfriend is only 8 weeks). Patient identifies that she is a trigger for his sobriety. Patient identifies that he is losing important parts of his life due to his girlfriend and isn't sure that he should continue to be with her. Sw suggested time apart from her for a few days. Patient is worried that he is going to be kicked out of his house due to choosing his girlfriend over his family. Sw patient to be protective of his mental health and his sobriety. Sw reminded patient that he is the only one who can control his life. In talking with patient about his girlfriend it appears to be that she may struggle with her own MH symptoms along with self confidence issues. Sw will continue to meet with patient when he is in office.

## 2021-08-19 ENCOUNTER — OFFICE VISIT (OUTPATIENT)
Dept: INTERNAL MEDICINE CLINIC | Age: 26
End: 2021-08-19
Payer: MEDICARE

## 2021-08-19 VITALS
DIASTOLIC BLOOD PRESSURE: 85 MMHG | WEIGHT: 186 LBS | HEIGHT: 73 IN | HEART RATE: 110 BPM | BODY MASS INDEX: 24.65 KG/M2 | TEMPERATURE: 97.9 F | SYSTOLIC BLOOD PRESSURE: 148 MMHG

## 2021-08-19 DIAGNOSIS — F19.10 POLYSUBSTANCE ABUSE (HCC): ICD-10-CM

## 2021-08-19 DIAGNOSIS — F32.A DEPRESSION, UNSPECIFIED DEPRESSION TYPE: ICD-10-CM

## 2021-08-19 DIAGNOSIS — Z11.59 SCREENING FOR VIRAL DISEASE: ICD-10-CM

## 2021-08-19 DIAGNOSIS — G62.9 NEUROPATHY: ICD-10-CM

## 2021-08-19 DIAGNOSIS — G89.4 CHRONIC PAIN SYNDROME: ICD-10-CM

## 2021-08-19 DIAGNOSIS — F11.20 SEVERE OPIOID USE DISORDER (HCC): Primary | ICD-10-CM

## 2021-08-19 DIAGNOSIS — Z51.81 ENCOUNTER FOR MONITORING SUBOXONE MAINTENANCE THERAPY: ICD-10-CM

## 2021-08-19 DIAGNOSIS — Z79.899 ENCOUNTER FOR MONITORING SUBOXONE MAINTENANCE THERAPY: ICD-10-CM

## 2021-08-19 PROCEDURE — 4004F PT TOBACCO SCREEN RCVD TLK: CPT | Performed by: INTERNAL MEDICINE

## 2021-08-19 PROCEDURE — 80305 DRUG TEST PRSMV DIR OPT OBS: CPT | Performed by: INTERNAL MEDICINE

## 2021-08-19 PROCEDURE — G8420 CALC BMI NORM PARAMETERS: HCPCS | Performed by: INTERNAL MEDICINE

## 2021-08-19 PROCEDURE — G8428 CUR MEDS NOT DOCUMENT: HCPCS | Performed by: INTERNAL MEDICINE

## 2021-08-19 PROCEDURE — 99213 OFFICE O/P EST LOW 20 MIN: CPT | Performed by: INTERNAL MEDICINE

## 2021-08-19 RX ORDER — BUPRENORPHINE AND NALOXONE 8; 2 MG/1; MG/1
1 FILM, SOLUBLE BUCCAL; SUBLINGUAL 2 TIMES DAILY
Qty: 14 FILM | Refills: 0 | Status: SHIPPED | OUTPATIENT
Start: 2021-08-19 | End: 2021-08-26

## 2021-08-19 NOTE — PROGRESS NOTES
8/19/2021                Drug Problem      I saw him here  8/5  Patient did bring his girlfriend with him several visits ago  He complains of a foot problem but will not go see the podiatrist  He says he takes Neurontin off the street  He also has a problem with benzos  I wanted to have his girlfriend come in to see the dynamics between them  She is pregnant with twins  Apparently she does not get along with his parents and he does not get along with her parents  Her parents recently            He says he has not done any Xanax but he does admit to doing gabapentin             Patient started using heroin 3 years ago  He started on pain pills in 2016     Patient uses it he began snorting it but now IV  Other drugs used: If he could not find heroin he would use anything he can get as he puts it to get out of himself  Suboxone programs in the past [facility]  He has been there a couple of years but he cannot afford it  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 Authorizing Provider   buprenorphine-naloxone (SUBOXONE) 8-2 MG FILM SL film Place 1 Film under the tongue 2 times daily for 4 days.  Yes Dwight Alberto MD   cloNIDine (CATAPRES) 0.1 MG tablet Take 1 tablet by mouth nightly  Dwight Alberto MD   buPROPion (WELLBUTRIN XL) 150 MG extended release tablet Take 1 tablet by mouth every morning  Dwight Alberto MD   doxyLAMINE succinate (GNP SLEEP AID) 25 MG tablet Take 1 tablet by mouth nightly as needed for Sleep  Chet Sawyer PA-C   ibuprofen (ADVIL;MOTRIN) 200 MG tablet Take 400 mg by mouth every 6 hours as needed for Pain  Historical Provider, MD       Social History     Tobacco Use    Smoking status: Current Every Day Smoker     Packs/day: 0.50     Types: Cigarettes     Start date: 9/17/2014    Smokeless tobacco: Former User     Quit date: 9/17/2015   Vaping Use    Vaping Use: Some days   Substance Use Topics    Alcohol use: No    Drug use: Yes     Frequency: 7.0 times per week     Types: Marijuana     Comment: heroin snort and iv, LAST USED METH 10/16/2019            PHYSICAL EXAMINATION:  [ INSTRUCTIONS:  \"[x]\" Indicates a positive item  \"[]\" Indicates a negative item  -- DELETE ALL ITEMS NOT EXAMINED]  No vitals done    Constitutional: [x] Appears well-developed and well-nourished [x] No apparent distress      [] Abnormal-   Mental status  [x] Alert and awake  [x] Oriented to person/place/time [x]Able to follow commands      Eyes:  EOM    [x]  Normal  [] Abnormal-  Sclera  []  Normal  [] Abnormal -         Discharge []  None visible  [] Abnormal -  Pupils normal           Psychiatric:       [x] Normal Affect [] No Hallucinations        [] Abnormal-   Urine tox screen today     Alcohol, Urine 08/19/2021  8:57 AM Unknown   NEG    Amphetamine Screen, Urine 08/19/2021  8:57 AM Unknown   NEG    Barbiturate Screen, Urine 08/19/2021  8:57 AM Unknown   NEG    Benzodiazepine Screen, Urine 08/19/2021  8:57 AM Unknown   POS    Buprenorphine Urine 08/19/2021  8:57 AM Unknown   POS    Cocaine Metabolite Screen, Urine 08/19/2021  8:57 AM Unknown   NEG    FENTANYL SCREEN, URINE 08/19/2021  8:57 AM Unknown   NEG    Gabapentin Screen, Urine 08/19/2021  8:57 AM Unknown   N/A    MDMA, Urine 08/19/2021  8:57 AM Unknown   NEG    Methadone Screen, Urine 08/19/2021  8:57 AM Unknown   NEG    Methamphetamine, Urine 08/19/2021  8:57 AM Unknown   NEG    Opiate Scrn, Ur 08/19/2021  8:57 AM Unknown   NEG    Oxycodone Screen, Ur 08/19/2021  8:57 AM Unknown   NEG    PCP Screen, Urine 08/19/2021  8:57 AM Unknown   NEG    Propoxyphene Screen, Urine 08/19/2021  8:57 AM Unknown   N/A    Synthetic Cannabinoids (K2) Screen, Urine 08/19/2021  8:57 AM Unknown   NEG    THC Screen, Urine 08/19/2021  8:57 AM Unknown   POS    Tramadol Scrn, Ur 08/19/2021  8:57 AM Unknown   NEG    Tricyclic Antidepressants, Urine 08/19/2021  8:57 AM Unknown   N/A         Diagnosis Orders   1. Severe opioid use disorder (HCC)  POCT Rapid Drug Screen    buprenorphine-naloxone (SUBOXONE) 8-2 MG FILM SL film   2. Screening for viral disease     3. Encounter for monitoring Suboxone maintenance therapy     4. Polysubstance abuse ([facility] [address]     5. Chronic pain syndrome     6. Neuropathy     7.  Depression, unspecified depression type       The plan will be to give him Suboxone  Follow-up 1 week  I do recommend couples counseling for the patient and his girlfriend  They both think this might be a long-term relationship but they realize that they are going to be connected for life as they're having twins together  I reviewed the [place] Automated Rx Reporting System report     There does not appear to be any discrepancies or overprescribing of controlled substances

## 2021-08-19 NOTE — PROGRESS NOTES
Verbal order per Dr. Mike Holguin for urine drug screen. Positive for BUP BZO ETG. Verified results with Zen Shankar. Dr. Mike Holguin ordered Suboxone 8mg film BID for patient. Verified dose with patient. Patient was sent home with 1 week script of Suboxone 8mg film BID and will be seen back in the office 8/26/21.

## 2021-08-19 NOTE — PROGRESS NOTES
Sw met with patient today. Patient reports that he did spend some time away from his girlfriend this week as recommended and was able to utilize time for self care. Sw praised client for not drinking and taking time for himself. Sw and patient discussed his concerns with her relationship and how to handle different situations that may come up in the next week. Sw is going to continue to meet with patient while in office, in one week.

## 2021-08-22 NOTE — PROGRESS NOTES
Smoker     Packs/day: 0.50     Types: Cigarettes     Start date: 9/17/2014    Smokeless tobacco: Former User     Quit date: 9/17/2015   Vaping Use    Vaping Use: Some days   Substance Use Topics    Alcohol use: No    Drug use: Yes     Frequency: 7.0 times per week     Types: Marijuana     Comment: heroin snort and iv, LAST USED METH 10/16/2019            PHYSICAL EXAMINATION:  [ INSTRUCTIONS:  \"[x]\" Indicates a positive item  \"[]\" Indicates a negative item  -- DELETE ALL ITEMS NOT EXAMINED]  No vitals done    Constitutional: [x] Appears well-developed and well-nourished [x] No apparent distress      [] Abnormal-   Mental status  [x] Alert and awake  [x] Oriented to person/place/time [x]Able to follow commands      Eyes:  EOM    [x]  Normal  [] Abnormal-  Sclera  []  Normal  [] Abnormal -         Discharge []  None visible  [] Abnormal -  Pupils normal           Psychiatric:       [x] Normal Affect [] No Hallucinations        [] Abnormal-   Urine tox screen today  Alcohol, Urine 08/19/2021  8:57 AM Unknown   NEG    Amphetamine Screen, Urine 08/19/2021  8:57 AM Unknown   NEG    Barbiturate Screen, Urine 08/19/2021  8:57 AM Unknown   NEG    Benzodiazepine Screen, Urine 08/19/2021  8:57 AM Unknown   POS    Buprenorphine Urine 08/19/2021  8:57 AM Unknown   POS    Cocaine Metabolite Screen, Urine 08/19/2021  8:57 AM Unknown   NEG    FENTANYL SCREEN, URINE 08/19/2021  8:57 AM Unknown   NEG    Gabapentin Screen, Urine 08/19/2021  8:57 AM Unknown   N/A    MDMA, Urine 08/19/2021  8:57 AM Unknown   NEG    Methadone Screen, Urine 08/19/2021  8:57 AM Unknown   NEG    Methamphetamine, Urine 08/19/2021  8:57 AM Unknown   NEG    Opiate Scrn, Ur 08/19/2021  8:57 AM Unknown   NEG    Oxycodone Screen, Ur 08/19/2021  8:57 AM Unknown   NEG    PCP Screen, Urine 08/19/2021  8:57 AM Unknown   NEG    Propoxyphene Screen, Urine 08/19/2021  8:57 AM Unknown   N/A    Synthetic Cannabinoids (K2) Screen, Urine 08/19/2021  8:57 AM Unknown NEG    THC Screen, Urine 08/19/2021  8:57 AM Unknown   POS    Tramadol Scrn, Ur 08/19/2021  8:57 AM Unknown   NEG    Tricyclic Antidepressants, Urine 08/19/2021  8:57 AM Unknown   N/A            Diagnosis Orders   1. Severe opioid use disorder (HCC)  POCT Rapid Drug Screen    buprenorphine-naloxone (SUBOXONE) 8-2 MG SL film 1 Film    DISCONTINUED: buprenorphine-naloxone (SUBOXONE) 8-2 MG FILM SL film   2. Screening for viral disease  COVID-19   3. Encounter for monitoring Suboxone maintenance therapy     4. Neuropathy     5. Polysubstance abuse ([facility] [address]     6. Chronic pain syndrome     7.  Anxiety     I had a 30-minute discussion with the patient and his girlfriend    The plan will be to give him Suboxone  Follow-up 3 days  I told the patient that I can only have so many patients at one time on Suboxone by federal law (275)  I told the patient I am approaching that number  If they are not compliant with treatment, provide doctored urines, etc I told the patient I may have to let them go because I want to see new patients who are committed      I reviewed the [place] Automated Rx Reporting System report     There does not appear to be any discrepancies or overprescribing of controlled substances  He saw Judah Finn here for counseling today  She is planning to continue to see him

## 2021-08-26 ENCOUNTER — OFFICE VISIT (OUTPATIENT)
Dept: INTERNAL MEDICINE CLINIC | Age: 26
End: 2021-08-26
Payer: MEDICARE

## 2021-08-26 VITALS
SYSTOLIC BLOOD PRESSURE: 125 MMHG | WEIGHT: 178 LBS | HEIGHT: 73 IN | BODY MASS INDEX: 23.59 KG/M2 | HEART RATE: 113 BPM | TEMPERATURE: 98 F | DIASTOLIC BLOOD PRESSURE: 75 MMHG

## 2021-08-26 DIAGNOSIS — G89.4 CHRONIC PAIN SYNDROME: ICD-10-CM

## 2021-08-26 DIAGNOSIS — F11.20 SEVERE OPIOID USE DISORDER (HCC): Primary | ICD-10-CM

## 2021-08-26 DIAGNOSIS — Z79.899 ENCOUNTER FOR MONITORING SUBOXONE MAINTENANCE THERAPY: ICD-10-CM

## 2021-08-26 DIAGNOSIS — F32.A DEPRESSION, UNSPECIFIED DEPRESSION TYPE: ICD-10-CM

## 2021-08-26 DIAGNOSIS — G62.9 NEUROPATHY: ICD-10-CM

## 2021-08-26 DIAGNOSIS — F41.9 ANXIETY: ICD-10-CM

## 2021-08-26 DIAGNOSIS — Z51.81 ENCOUNTER FOR MONITORING SUBOXONE MAINTENANCE THERAPY: ICD-10-CM

## 2021-08-26 DIAGNOSIS — F19.10 POLYSUBSTANCE ABUSE (HCC): ICD-10-CM

## 2021-08-26 PROCEDURE — 80305 DRUG TEST PRSMV DIR OPT OBS: CPT | Performed by: INTERNAL MEDICINE

## 2021-08-26 PROCEDURE — 4004F PT TOBACCO SCREEN RCVD TLK: CPT | Performed by: INTERNAL MEDICINE

## 2021-08-26 PROCEDURE — 99213 OFFICE O/P EST LOW 20 MIN: CPT | Performed by: INTERNAL MEDICINE

## 2021-08-26 PROCEDURE — G8428 CUR MEDS NOT DOCUMENT: HCPCS | Performed by: INTERNAL MEDICINE

## 2021-08-26 PROCEDURE — G8420 CALC BMI NORM PARAMETERS: HCPCS | Performed by: INTERNAL MEDICINE

## 2021-08-26 RX ORDER — BUPRENORPHINE AND NALOXONE 8; 2 MG/1; MG/1
1 FILM, SOLUBLE BUCCAL; SUBLINGUAL 2 TIMES DAILY
Qty: 14 FILM | Refills: 0 | Status: CANCELLED | OUTPATIENT
Start: 2021-08-26 | End: 2021-09-02

## 2021-08-26 RX ORDER — BUPRENORPHINE HYDROCHLORIDE, NALOXONE HYDROCHLORIDE 8; 2 MG/1; MG/1
1 FILM, SOLUBLE BUCCAL; SUBLINGUAL 2 TIMES DAILY
Qty: 14 FILM | Refills: 0 | Status: SHIPPED | OUTPATIENT
Start: 2021-08-26 | End: 2021-09-02

## 2021-08-26 NOTE — PROGRESS NOTES
Sw met with patient again today. Patient reports that he is clean though he did drink last night, which resulted in missing his appointment this morning though he was able to reschedule for this afternoon. Patient reports feeling increased depression symptoms today. Patient reports that he was almost fired today but has an appointment in one week. Patient continues to be with girlfriend. Patient was connected to [email or web address] today to complete intake over the phone. Patient will continue to meet with Terry after each appointment.

## 2021-08-26 NOTE — PROGRESS NOTES
8/26/2021                Drug Problem      I saw him here  8/19  Patient did bring his girlfriend with him several visits ago  I had heard word from another one of my patients that his girlfriend gives in her urine when he comes in  He has been more than sketchy in the past  He often misses appointments comes in late  I was just going to fire him today  He started crying and stating he has not used  I told him if I can observe him giving a sample and it is clean I will reconsider             He says he has not done any Xanax but he does admit to doing gabapentin             Patient started using heroin 3 years ago  He started on pain pills in 2016     Patient uses it he began snorting it but now IV  Other drugs used: If he could not find heroin he would use anything he can get as he puts it to get out of himself  Suboxone programs in the past [facility] in [place]  He has been there a couple of years but he cannot afford it  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 Authorizing Provider   SUBOXONE 8-2 MG FILM SL film Place 1 Film under the tongue 2 times daily for 7 days.  Yes Malgorzata Santos MD   cloNIDine (CATAPRES) 0.1 MG tablet Take 1 tablet by mouth nightly  Malgorzata Santos MD   buPROPion (WELLBUTRIN XL) 150 MG extended release tablet Take 1 tablet by mouth every morning  Malgorzata Santos MD   doxyLAMINE succinate (GNP SLEEP AID) 25 MG tablet Take 1 tablet by mouth nightly as needed for Sleep  [facility]   ibuprofen (ADVIL;MOTRIN) 200 MG tablet Take 400 mg by mouth every 6 hours as needed for Pain  Historical Provider, MD       Social History     Tobacco Use    Smoking status: Current Every Day Smoker     Packs/day: 0.50 Types: Cigarettes     Start date: 9/17/2014    Smokeless tobacco: Former User     Quit date: 9/17/2015   Vaping Use    Vaping Use: Some days   Substance Use Topics    Alcohol use: No    Drug use: Yes     Frequency: 7.0 times per week     Types: Marijuana     Comment: heroin snort and iv, LAST USED METH 10/16/2019            PHYSICAL EXAMINATION:  [ INSTRUCTIONS:  \"[x]\" Indicates a positive item  \"[]\" Indicates a negative item  -- DELETE ALL ITEMS NOT EXAMINED]  No vitals done    Constitutional: [x] Appears well-developed and well-nourished [x] No apparent distress      [] Abnormal-   Mental status  [x] Alert and awake  [x] Oriented to person/place/time [x]Able to follow commands      Eyes:  EOM    [x]  Normal  [] Abnormal-  Sclera  []  Normal  [] Abnormal -         Discharge []  None visible  [] Abnormal -  Pupils normal           Psychiatric:       [x] Normal Affect [] No Hallucinations        [] Abnormal-   Urine tox screen today (directly observed by myself)     Alcohol, Urine 08/26/2021  2:02 PM Unknown   NEG    Amphetamine Screen, Urine 08/26/2021  2:02 PM Unknown   NEG    Barbiturate Screen, Urine 08/26/2021  2:02 PM Unknown   NEG    Benzodiazepine Screen, Urine 08/26/2021  2:02 PM Unknown   NEG    Buprenorphine Urine 08/26/2021  2:02 PM Unknown   POS    Cocaine Metabolite Screen, Urine 08/26/2021  2:02 PM Unknown   NEG    FENTANYL SCREEN, URINE 08/26/2021  2:02 PM Unknown   NEG    Gabapentin Screen, Urine 08/26/2021  2:02 PM Unknown   N/A    MDMA, Urine 08/26/2021  2:02 PM Unknown   NEG    Methadone Screen, Urine 08/26/2021  2:02 PM Unknown   NEG    Methamphetamine, Urine 08/26/2021  2:02 PM Unknown   NEG    Opiate Scrn, Ur 08/26/2021  2:02 PM Unknown   NEG    Oxycodone Screen, Ur 08/26/2021  2:02 PM Unknown   NEG    PCP Screen, Urine 08/26/2021  2:02 PM Unknown   NEG    Propoxyphene Screen, Urine 08/26/2021  2:02 PM Unknown   N/A    Synthetic Cannabinoids (K2) Screen, Urine 08/26/2021  2:02 PM Unknown NEG    THC Screen, Urine 08/26/2021  2:02 PM Unknown   POS    Tramadol Scrn, Ur 08/26/2021  2:02 PM Unknown   NEG    Tricyclic Antidepressants, Urine 08/26/2021  2:02 PM Unknown   N/A         Diagnosis Orders   1. Severe opioid use disorder (HCC)  POCT Rapid Drug Screen    SUBOXONE 8-2 MG FILM SL film   2. Polysubstance abuse ([address]     3. Encounter for monitoring Suboxone maintenance therapy     4. Chronic pain syndrome     5. Neuropathy     6. Anxiety     7.  Depression, unspecified depression type       Urine is clean  I had a long talk with the patient  He said his girlfriend is very controlling  She has alienated him from his family  Her family does not like him  Apparently she is pregnant with twins  I will give him Suboxone 8 mg twice daily  I am going to send him down to [facility] for some counseling today I think he really needs it  Follow-up 1 week

## 2021-08-26 NOTE — PROGRESS NOTES
Verbal order per Dr. Nba Olguin for urine drug screen. Positive for BUP THC. Verified results with Wanda Mares. Dr. Nba Olguin ordered Suboxone 8mg film BID for patient. Verified dose with patient. Patient was sent home with 1 week script of Suboxone 8mg film BID and will be seen back in the office 9/2/21.

## 2021-09-14 ENCOUNTER — CLINICAL DOCUMENTATION (OUTPATIENT)
Dept: INTERNAL MEDICINE CLINIC | Age: 26
End: 2021-09-14

## 2021-09-14 ENCOUNTER — OFFICE VISIT (OUTPATIENT)
Dept: INTERNAL MEDICINE CLINIC | Age: 26
End: 2021-09-14
Payer: MEDICARE

## 2021-09-14 VITALS
HEIGHT: 73 IN | WEIGHT: 182 LBS | DIASTOLIC BLOOD PRESSURE: 76 MMHG | SYSTOLIC BLOOD PRESSURE: 121 MMHG | BODY MASS INDEX: 24.12 KG/M2 | HEART RATE: 107 BPM | TEMPERATURE: 97.6 F

## 2021-09-14 DIAGNOSIS — Z79.899 ENCOUNTER FOR MONITORING SUBOXONE MAINTENANCE THERAPY: ICD-10-CM

## 2021-09-14 DIAGNOSIS — G89.4 CHRONIC PAIN SYNDROME: ICD-10-CM

## 2021-09-14 DIAGNOSIS — F11.20 SEVERE OPIOID USE DISORDER (HCC): Primary | ICD-10-CM

## 2021-09-14 DIAGNOSIS — F32.A DEPRESSION, UNSPECIFIED DEPRESSION TYPE: ICD-10-CM

## 2021-09-14 DIAGNOSIS — F41.9 ANXIETY: ICD-10-CM

## 2021-09-14 DIAGNOSIS — Z51.81 ENCOUNTER FOR MONITORING SUBOXONE MAINTENANCE THERAPY: ICD-10-CM

## 2021-09-14 DIAGNOSIS — F19.10 POLYSUBSTANCE ABUSE (HCC): ICD-10-CM

## 2021-09-14 DIAGNOSIS — G62.9 NEUROPATHY: ICD-10-CM

## 2021-09-14 PROCEDURE — 99213 OFFICE O/P EST LOW 20 MIN: CPT | Performed by: INTERNAL MEDICINE

## 2021-09-14 PROCEDURE — 4004F PT TOBACCO SCREEN RCVD TLK: CPT | Performed by: INTERNAL MEDICINE

## 2021-09-14 PROCEDURE — G8428 CUR MEDS NOT DOCUMENT: HCPCS | Performed by: INTERNAL MEDICINE

## 2021-09-14 PROCEDURE — 80305 DRUG TEST PRSMV DIR OPT OBS: CPT | Performed by: INTERNAL MEDICINE

## 2021-09-14 PROCEDURE — G8420 CALC BMI NORM PARAMETERS: HCPCS | Performed by: INTERNAL MEDICINE

## 2021-09-14 RX ORDER — BUPRENORPHINE AND NALOXONE 8; 2 MG/1; MG/1
1 FILM, SOLUBLE BUCCAL; SUBLINGUAL 2 TIMES DAILY
Qty: 14 FILM | Refills: 0 | Status: SHIPPED | OUTPATIENT
Start: 2021-09-14 | End: 2021-09-21

## 2021-09-14 NOTE — PROGRESS NOTES
Sw attempted to contact patient due to him not stopping in office after doctors appointment. Patients phone is off and went straight to voicemail. Sw left vm for patient, to call back.

## 2021-09-14 NOTE — PROGRESS NOTES
9/14/2021                Drug Problem      I saw him here  8/26  He did spend several days in [place] they have a property there    Patient did bring his girlfriend with him several visits ago  I had heard word from another one of my patients that his girlfriend gives in her urine when he comes in  He has been more than sketchy in the past  He often misses appointments comes in late  I was just going to fire him today  He started crying and stating he has not used  I told him if I can observe him giving a sample and it is clean I will reconsider             He says he has not done any Xanax but he does admit to doing gabapentin             Patient started using heroin 3 years ago  He started on pain pills in 2016     Patient uses it he began snorting it but now IV  Other drugs used: If he could not find heroin he would use anything he can get as he puts it to get out of himself  Suboxone programs in the past [facility] in [place]  He has been there a couple of years but he cannot afford it  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 Authorizing Provider   buprenorphine-naloxone (SUBOXONE) 8-2 MG FILM SL film Place 1 Film under the tongue 2 times daily for 7 days.  Yes Katherine Schultz MD   cloNIDine (CATAPRES) 0.1 MG tablet Take 1 tablet by mouth nightly  Katherine Schultz MD   buPROPion (WELLBUTRIN XL) 150 MG extended release tablet Take 1 tablet by mouth every morning  Katherine Schultz MD   doxyLAMINE succinate (GNP SLEEP AID) 25 MG tablet Take 1 tablet by mouth nightly as needed for Sleep  [employer]   ibuprofen (ADVIL;MOTRIN) 200 MG tablet Take 400 mg by mouth every 6 hours as needed for Pain  Historical Provider, MD Social History     Tobacco Use    Smoking status: Current Every Day Smoker     Packs/day: 0.50     Types: Cigarettes     Start date: 9/17/2014    Smokeless tobacco: Former User     Quit date: 9/17/2015   Vaping Use    Vaping Use: Some days   Substance Use Topics    Alcohol use: No    Drug use: Yes     Frequency: 7.0 times per week     Types: Marijuana     Comment: heroin snort and iv, LAST USED METH 10/16/2019            PHYSICAL EXAMINATION:  [ INSTRUCTIONS:  \"[x]\" Indicates a positive item  \"[]\" Indicates a negative item  -- DELETE ALL ITEMS NOT EXAMINED]  No vitals done    Constitutional: [x] Appears well-developed and well-nourished [x] No apparent distress      [] Abnormal-   Mental status  [x] Alert and awake  [x] Oriented to person/place/time [x]Able to follow commands      Eyes:  EOM    [x]  Normal  [] Abnormal-  Sclera  []  Normal  [] Abnormal -         Discharge []  None visible  [] Abnormal -  Pupils normal           Psychiatric:       [x] Normal Affect [] No Hallucinations        [] Abnormal-   Urine tox screen today        Alcohol, Urine 09/14/2021  2:02 PM Unknown   POS    Amphetamine Screen, Urine 09/14/2021  2:02 PM Unknown   NEG    Barbiturate Screen, Urine 09/14/2021  2:02 PM Unknown   NEG    Benzodiazepine Screen, Urine 09/14/2021  2:02 PM Unknown   NEG    Buprenorphine Urine 09/14/2021  2:02 PM Unknown   POS    Cocaine Metabolite Screen, Urine 09/14/2021  2:02 PM Unknown   NEG    FENTANYL SCREEN, URINE 09/14/2021  2:02 PM Unknown   NEG    Gabapentin Screen, Urine 09/14/2021  2:02 PM Unknown   N/A    MDMA, Urine 09/14/2021  2:02 PM Unknown   NEG    Methadone Screen, Urine 09/14/2021  2:02 PM Unknown   NEG    Methamphetamine, Urine 09/14/2021  2:02 PM Unknown   NEG    Opiate Scrn, Ur 09/14/2021  2:02 PM Unknown   NEG    Oxycodone Screen, Ur 09/14/2021  2:02 PM Unknown   NEG    PCP Screen, Urine 09/14/2021  2:02 PM Unknown   NEG    Propoxyphene Screen, Urine 09/14/2021  2:02 PM Unknown N/A    Synthetic Cannabinoids (K2) Screen, Urine 09/14/2021  2:02 PM Unknown   NEG    THC Screen, Urine 09/14/2021  2:02 PM Unknown   POS    Tramadol Scrn, Ur 09/14/2021  2:02 PM Unknown   NEG    Tricyclic Antidepressants, Urine 09/14/2021  2:02 PM Unknown   N/A         Diagnosis Orders   1. Severe opioid use disorder (HCC)  POCT Rapid Drug Screen    buprenorphine-naloxone (SUBOXONE) 8-2 MG FILM SL film   2. Polysubstance abuse ([facility] [address]     3. Encounter for monitoring Suboxone maintenance therapy     4. Anxiety     5. Neuropathy     6. Chronic pain syndrome     7.  Depression, unspecified depression type       Urine is clean  I had a long talk with the patient  He said his girlfriend is very controlling  She has alienated him from his family  Her family does not like him  Apparently she is pregnant with twins  I will give him Suboxone 8 mg twice daily  He is seeing Jerica Burrell for counseling  Follow-up 1 week

## 2021-09-22 ENCOUNTER — OFFICE VISIT (OUTPATIENT)
Dept: INTERNAL MEDICINE CLINIC | Age: 26
End: 2021-09-22
Payer: MEDICARE

## 2021-09-22 VITALS
BODY MASS INDEX: 24.92 KG/M2 | WEIGHT: 188 LBS | SYSTOLIC BLOOD PRESSURE: 174 MMHG | HEART RATE: 91 BPM | DIASTOLIC BLOOD PRESSURE: 97 MMHG | HEIGHT: 73 IN | TEMPERATURE: 98 F

## 2021-09-22 DIAGNOSIS — Z51.81 ENCOUNTER FOR MONITORING SUBOXONE MAINTENANCE THERAPY: ICD-10-CM

## 2021-09-22 DIAGNOSIS — F19.10 POLYSUBSTANCE ABUSE (HCC): ICD-10-CM

## 2021-09-22 DIAGNOSIS — G62.9 NEUROPATHY: ICD-10-CM

## 2021-09-22 DIAGNOSIS — F11.20 SEVERE OPIOID USE DISORDER (HCC): Primary | ICD-10-CM

## 2021-09-22 DIAGNOSIS — F41.9 ANXIETY: ICD-10-CM

## 2021-09-22 DIAGNOSIS — G89.4 CHRONIC PAIN SYNDROME: ICD-10-CM

## 2021-09-22 DIAGNOSIS — Z91.199 NONCOMPLIANCE: ICD-10-CM

## 2021-09-22 DIAGNOSIS — F32.A DEPRESSION, UNSPECIFIED DEPRESSION TYPE: ICD-10-CM

## 2021-09-22 DIAGNOSIS — Z79.899 ENCOUNTER FOR MONITORING SUBOXONE MAINTENANCE THERAPY: ICD-10-CM

## 2021-09-22 DIAGNOSIS — F10.10 ALCOHOL ABUSE: ICD-10-CM

## 2021-09-22 PROCEDURE — 4004F PT TOBACCO SCREEN RCVD TLK: CPT | Performed by: INTERNAL MEDICINE

## 2021-09-22 PROCEDURE — 99213 OFFICE O/P EST LOW 20 MIN: CPT | Performed by: INTERNAL MEDICINE

## 2021-09-22 PROCEDURE — G8428 CUR MEDS NOT DOCUMENT: HCPCS | Performed by: INTERNAL MEDICINE

## 2021-09-22 PROCEDURE — 80305 DRUG TEST PRSMV DIR OPT OBS: CPT | Performed by: INTERNAL MEDICINE

## 2021-09-22 PROCEDURE — G8420 CALC BMI NORM PARAMETERS: HCPCS | Performed by: INTERNAL MEDICINE

## 2021-09-22 RX ORDER — BUPRENORPHINE AND NALOXONE 8; 2 MG/1; MG/1
1 FILM, SOLUBLE BUCCAL; SUBLINGUAL 2 TIMES DAILY
Qty: 12 FILM | Refills: 0 | Status: SHIPPED | OUTPATIENT
Start: 2021-09-22 | End: 2021-09-28

## 2021-09-22 NOTE — PROGRESS NOTES
Verbal order per Dr. Teresa Larson for urine drug screen. Positive for BUP ETG THC. Verified results with Brett Saravia. Dr. Teresa Larson ordered Suboxone 8mg film BID for patient. Verified dose with patient. Patient was sent home with 6 day script of Suboxone 8mg film BID and will be seen back in the office 9/28/21.

## 2021-09-22 NOTE — PROGRESS NOTES
9/22/2021                Drug Problem      I saw him here  9/14  He missed an appointment yesterday  He said he was working  We had gotten word that he was arrested for domestic violence  He relates a story 3 days  his father would not let him in the house   his father pushed him down he called the police they took the patient to group home he said he was only there for  4 hours  Basically I really do not know what to believe with this patient      Patient did bring his girlfriend with him several visits ago  I had heard word from another one of my patients that his girlfriend gives in her urine when he comes in  He has been more than geetha in the past  He often misses appointments comes in late  I was just going to fire him today  He started crying and stating he has not used  I told him if I can observe him giving a sample and it is clean I will reconsider             He says he has not done any Xanax but he does admit to doing gabapentin             Patient started using heroin 3 years ago  He started on pain pills in 2016     Patient uses it he began snorting it but now IV  Other drugs used: If he could not find heroin he would use anything he can get as he puts it to get out of himself  Suboxone programs in the past [facility] in [place]  He has been there a couple of years but he cannot afford it  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 Authorizing Provider   buprenorphine-naloxone (SUBOXONE) 8-2 MG FILM SL film Place 1 Film under the tongue 2 times daily for 6 days.  Yes Maty Villeda MD   cloNIDine (CATAPRES) 0.1 MG tablet Take 1 tablet by mouth nightly  Maty Villeda MD   buPROPion (WELLBUTRIN XL) 150 MG extended release tablet AM Unknown   NEG    Methamphetamine, Urine 09/22/2021  9:23 AM Unknown   NEG    Opiate Scrn, Ur 09/22/2021  9:23 AM Unknown   NEG    Oxycodone Screen, Ur 09/22/2021  9:23 AM Unknown   NEG    PCP Screen, Urine 09/22/2021  9:23 AM Unknown   NEG    Propoxyphene Screen, Urine 09/22/2021  9:23 AM Unknown   N/A    Synthetic Cannabinoids (K2) Screen, Urine 09/22/2021  9:23 AM Unknown   NEG    THC Screen, Urine 09/22/2021  9:23 AM Unknown   POS    Tramadol Scrn, Ur 09/22/2021  9:23 AM Unknown   NEG    Tricyclic Antidepressants, Urine 09/22/2021  9:23 AM Unknown   N/A           Diagnosis Orders   1. Severe opioid use disorder (HCC)  POCT Rapid Drug Screen    buprenorphine-naloxone (SUBOXONE) 8-2 MG FILM SL film   2. Polysubstance abuse ([address]     3. Anxiety     4. Encounter for monitoring Suboxone maintenance therapy     5. Neuropathy     6. Chronic pain syndrome     7. Depression, unspecified depression type     8. Alcohol abuse     9.  Noncompliance       Urine has alcohol buprenorphine and THC  It has been rumor that he brings his girlfriends urine  He misses multiple appointments  I basically do not believe a word he says  I sent him to an exam room to get a mouth swab  The nurse told me that he just left  I have given him multiple chances and he continues with the same behavior  I have nothing else to offer him  I told the  I would not see him again

## 2021-09-27 ENCOUNTER — CLINICAL DOCUMENTATION (OUTPATIENT)
Dept: INTERNAL MEDICINE CLINIC | Age: 26
End: 2021-09-27

## 2021-09-27 DIAGNOSIS — F11.20 SEVERE OPIOID USE DISORDER (HCC): Primary | ICD-10-CM

## 2021-09-27 RX ORDER — BUPRENORPHINE AND NALOXONE 8; 2 MG/1; MG/1
1 FILM, SOLUBLE BUCCAL; SUBLINGUAL 2 TIMES DAILY
Qty: 6 FILM | Refills: 0 | OUTPATIENT
Start: 2021-09-27 | End: 2021-09-30

## 2021-09-27 NOTE — PROGRESS NOTES
Patient presented to sw office prior to his scheduled appointment time. Patient reports that he received a voicemail that he needs to find other treatment. Patient appear upset and doesn't want to find other treatment. Patient understands that he needs to take his treatment more seriously. Sw offered support and information on other programs. Sw encouraged patient to consider residential treatment. Dr. Ady Diez did tell patient that he needed to be more serious about his treatment at his multiple appointments. Terry staffed case with Dr. Ady Diez who is only willing to continue treatment for three more days. Sw will inform patient and offered to assist with finding new treatment.

## 2021-10-27 NOTE — PROGRESS NOTES
MEDICATION ASSISTED TREATMENT ENCOUNTER    HISTORY OF PRESENT ILLNESS  Patient presents for evaluation of opioid use and would like to be placed on medication assisted treatment  Patient has had problems using heroin  I saw him here  9/16  We did a video visit 7/29    He is back to work  He got his for Sublocade injection last month      Patient started using heroin 3 years ago  He started on pain pills in 2016  He thought there were Percocet but they were pressed fentanyl  Patient uses it he began snorting it but now IV  Other drugs used: If he could not find heroin he would use anything he can get as he puts it to get out of himself  Suboxone programs in the past [facility] in [place]  He has been there a couple of years but he cannot afford it  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 ROS     General:Patient is feeling well  Patient is not experiencing  withdrawal symptoms ,no urges or cravings  Patient is not having any side effects from the buprenorphine        PHYSICAL EXAM  Blood pressure 130/60, pulse 99, temperature 98.1 °F (36.7 °C), height 6' (1.829 m), weight 174 lb (78.9 kg).          General: Patient resting comfortably in no acute distress     Mental status: Alert and oriented to person place and time, no hallucinations or delusions     Eyes: Pupils are normal      URINE DRUG SCREEN TODAY:  Component  Collected  Lab    Alcohol, Urine  09/30/2020 10:25 AM  Unknown    N/A    Amphetamine Screen, Urine  09/30/2020 10:25 AM  Unknown    NEG    Barbiturate Screen, Urine  09/30/2020 10:25 AM  Unknown    NEG
Verbal order per Dr. Mariusz Levy for urine drug screen. Positive for BUP, THC. Verified results with Court b[address] ESEN. Dr. Mariusz Levy ordered Suboxone 8 mg film BID for patient. Verified dose with patient. Patient was sent home with 2 week script for Suboxone 8 mg film BID and will be seen back in the office on 10/14/20. UC sent.
100

## 2022-06-08 ENCOUNTER — APPOINTMENT (OUTPATIENT)
Dept: CT IMAGING | Age: 27
End: 2022-06-08
Payer: MEDICARE

## 2022-06-08 ENCOUNTER — HOSPITAL ENCOUNTER (EMERGENCY)
Age: 27
Discharge: HOME | End: 2022-06-08
Attending: EMERGENCY MEDICINE
Payer: MEDICARE

## 2022-06-08 VITALS
SYSTOLIC BLOOD PRESSURE: 166 MMHG | HEART RATE: 85 BPM | WEIGHT: 180 LBS | OXYGEN SATURATION: 98 % | HEIGHT: 72 IN | RESPIRATION RATE: 16 BRPM | BODY MASS INDEX: 24.38 KG/M2 | TEMPERATURE: 97.6 F | DIASTOLIC BLOOD PRESSURE: 98 MMHG

## 2022-06-08 DIAGNOSIS — R10.13 ABDOMINAL PAIN, EPIGASTRIC: Primary | ICD-10-CM

## 2022-06-08 LAB
ALBUMIN SERPL-MCNC: 4.5 G/DL (ref 3.5–5.1)
ALP BLD-CCNC: 93 U/L (ref 38–126)
ALT SERPL-CCNC: 33 U/L (ref 11–66)
AMPHETAMINE+METHAMPHETAMINE URINE SCREEN: NEGATIVE
ANION GAP SERPL CALCULATED.3IONS-SCNC: 15 MEQ/L (ref 8–16)
AST SERPL-CCNC: 22 U/L (ref 5–40)
BARBITURATE QUANTITATIVE URINE: NEGATIVE
BASOPHILS # BLD: 0.4 %
BASOPHILS ABSOLUTE: 0 THOU/MM3 (ref 0–0.1)
BENZODIAZEPINE QUANTITATIVE URINE: NEGATIVE
BILIRUB SERPL-MCNC: 0.9 MG/DL (ref 0.3–1.2)
BUN BLDV-MCNC: 8 MG/DL (ref 7–22)
CALCIUM SERPL-MCNC: 9 MG/DL (ref 8.5–10.5)
CANNABINOID QUANTITATIVE URINE: POSITIVE
CHLORIDE BLD-SCNC: 104 MEQ/L (ref 98–111)
CO2: 23 MEQ/L (ref 23–33)
COCAINE METABOLITE QUANTITATIVE URINE: NEGATIVE
CREAT SERPL-MCNC: 0.6 MG/DL (ref 0.4–1.2)
D-DIMER QUANTITATIVE: 348 NG/ML FEU (ref 0–500)
EKG ATRIAL RATE: 69 BPM
EKG P AXIS: 3 DEGREES
EKG P-R INTERVAL: 142 MS
EKG Q-T INTERVAL: 408 MS
EKG QRS DURATION: 102 MS
EKG QTC CALCULATION (BAZETT): 437 MS
EKG R AXIS: 65 DEGREES
EKG T AXIS: 25 DEGREES
EKG VENTRICULAR RATE: 69 BPM
EOSINOPHIL # BLD: 1.9 %
EOSINOPHILS ABSOLUTE: 0.2 THOU/MM3 (ref 0–0.4)
ERYTHROCYTE [DISTWIDTH] IN BLOOD BY AUTOMATED COUNT: 13 % (ref 11.5–14.5)
ERYTHROCYTE [DISTWIDTH] IN BLOOD BY AUTOMATED COUNT: 43.4 FL (ref 35–45)
ETHYL ALCOHOL, SERUM: < 0.01 %
GFR SERPL CREATININE-BSD FRML MDRD: > 90 ML/MIN/1.73M2
GLUCOSE BLD-MCNC: 126 MG/DL (ref 70–108)
HCT VFR BLD CALC: 44.2 % (ref 42–52)
HEMOGLOBIN: 15.5 GM/DL (ref 14–18)
IMMATURE GRANS (ABS): 0.04 THOU/MM3 (ref 0–0.07)
IMMATURE GRANULOCYTES: 0.3 %
LIPASE: 178 U/L (ref 5.6–51.3)
LYMPHOCYTES # BLD: 35.1 %
LYMPHOCYTES ABSOLUTE: 4.2 THOU/MM3 (ref 1–4.8)
MAGNESIUM: 1.8 MG/DL (ref 1.6–2.4)
MCH RBC QN AUTO: 32 PG (ref 26–33)
MCHC RBC AUTO-ENTMCNC: 35.1 GM/DL (ref 32.2–35.5)
MCV RBC AUTO: 91.1 FL (ref 80–94)
MONOCYTES # BLD: 9.7 %
MONOCYTES ABSOLUTE: 1.2 THOU/MM3 (ref 0.4–1.3)
NUCLEATED RED BLOOD CELLS: 0 /100 WBC
OPIATES, URINE: NEGATIVE
OSMOLALITY CALCULATION: 283 MOSMOL/KG (ref 275–300)
OXYCODONE: NEGATIVE
PHENCYCLIDINE QUANTITATIVE URINE: NEGATIVE
PLATELET # BLD: 385 THOU/MM3 (ref 130–400)
PMV BLD AUTO: 8.7 FL (ref 9.4–12.4)
POTASSIUM REFLEX MAGNESIUM: 3.5 MEQ/L (ref 3.5–5.2)
PRO-BNP: 61.7 PG/ML (ref 0–450)
RBC # BLD: 4.85 MILL/MM3 (ref 4.7–6.1)
SEG NEUTROPHILS: 52.6 %
SEGMENTED NEUTROPHILS ABSOLUTE COUNT: 6.3 THOU/MM3 (ref 1.8–7.7)
SODIUM BLD-SCNC: 142 MEQ/L (ref 135–145)
TOTAL PROTEIN: 7.6 G/DL (ref 6.1–8)
WBC # BLD: 11.9 THOU/MM3 (ref 4.8–10.8)

## 2022-06-08 PROCEDURE — 96374 THER/PROPH/DIAG INJ IV PUSH: CPT

## 2022-06-08 PROCEDURE — 80307 DRUG TEST PRSMV CHEM ANLYZR: CPT

## 2022-06-08 PROCEDURE — 96375 TX/PRO/DX INJ NEW DRUG ADDON: CPT

## 2022-06-08 PROCEDURE — A4216 STERILE WATER/SALINE, 10 ML: HCPCS | Performed by: EMERGENCY MEDICINE

## 2022-06-08 PROCEDURE — 6360000002 HC RX W HCPCS: Performed by: EMERGENCY MEDICINE

## 2022-06-08 PROCEDURE — 83735 ASSAY OF MAGNESIUM: CPT

## 2022-06-08 PROCEDURE — 83690 ASSAY OF LIPASE: CPT

## 2022-06-08 PROCEDURE — 85025 COMPLETE CBC W/AUTO DIFF WBC: CPT

## 2022-06-08 PROCEDURE — 6360000004 HC RX CONTRAST MEDICATION: Performed by: EMERGENCY MEDICINE

## 2022-06-08 PROCEDURE — 2580000003 HC RX 258: Performed by: EMERGENCY MEDICINE

## 2022-06-08 PROCEDURE — 2500000003 HC RX 250 WO HCPCS: Performed by: EMERGENCY MEDICINE

## 2022-06-08 PROCEDURE — 80053 COMPREHEN METABOLIC PANEL: CPT

## 2022-06-08 PROCEDURE — 85379 FIBRIN DEGRADATION QUANT: CPT

## 2022-06-08 PROCEDURE — 74177 CT ABD & PELVIS W/CONTRAST: CPT

## 2022-06-08 PROCEDURE — 96376 TX/PRO/DX INJ SAME DRUG ADON: CPT

## 2022-06-08 PROCEDURE — 82077 ASSAY SPEC XCP UR&BREATH IA: CPT

## 2022-06-08 PROCEDURE — 93005 ELECTROCARDIOGRAM TRACING: CPT | Performed by: EMERGENCY MEDICINE

## 2022-06-08 PROCEDURE — 99285 EMERGENCY DEPT VISIT HI MDM: CPT

## 2022-06-08 PROCEDURE — 93010 ELECTROCARDIOGRAM REPORT: CPT | Performed by: INTERNAL MEDICINE

## 2022-06-08 PROCEDURE — 83880 ASSAY OF NATRIURETIC PEPTIDE: CPT

## 2022-06-08 RX ORDER — KETOROLAC TROMETHAMINE 30 MG/ML
30 INJECTION, SOLUTION INTRAMUSCULAR; INTRAVENOUS ONCE
Status: COMPLETED | OUTPATIENT
Start: 2022-06-08 | End: 2022-06-08

## 2022-06-08 RX ORDER — ONDANSETRON 4 MG/1
4 TABLET, ORALLY DISINTEGRATING ORAL EVERY 8 HOURS PRN
Qty: 9 TABLET | Refills: 0 | Status: SHIPPED | OUTPATIENT
Start: 2022-06-08 | End: 2022-06-11

## 2022-06-08 RX ORDER — DICYCLOMINE HYDROCHLORIDE 10 MG/1
10 CAPSULE ORAL 3 TIMES DAILY PRN
Qty: 9 CAPSULE | Refills: 0 | Status: SHIPPED | OUTPATIENT
Start: 2022-06-08 | End: 2022-06-11

## 2022-06-08 RX ORDER — ONDANSETRON 2 MG/ML
4 INJECTION INTRAMUSCULAR; INTRAVENOUS ONCE
Status: COMPLETED | OUTPATIENT
Start: 2022-06-08 | End: 2022-06-08

## 2022-06-08 RX ORDER — 0.9 % SODIUM CHLORIDE 0.9 %
1000 INTRAVENOUS SOLUTION INTRAVENOUS ONCE
Status: COMPLETED | OUTPATIENT
Start: 2022-06-08 | End: 2022-06-08

## 2022-06-08 RX ORDER — METOCLOPRAMIDE HYDROCHLORIDE 5 MG/ML
10 INJECTION INTRAMUSCULAR; INTRAVENOUS ONCE
Status: COMPLETED | OUTPATIENT
Start: 2022-06-08 | End: 2022-06-08

## 2022-06-08 RX ORDER — MORPHINE SULFATE 2 MG/ML
4 INJECTION, SOLUTION INTRAMUSCULAR; INTRAVENOUS ONCE
Status: DISCONTINUED | OUTPATIENT
Start: 2022-06-08 | End: 2022-06-08

## 2022-06-08 RX ADMIN — METOCLOPRAMIDE 10 MG: 5 INJECTION, SOLUTION INTRAMUSCULAR; INTRAVENOUS at 13:00

## 2022-06-08 RX ADMIN — FAMOTIDINE 20 MG: 10 INJECTION, SOLUTION INTRAVENOUS at 12:01

## 2022-06-08 RX ADMIN — KETOROLAC TROMETHAMINE 30 MG: 30 INJECTION, SOLUTION INTRAMUSCULAR; INTRAVENOUS at 16:21

## 2022-06-08 RX ADMIN — IOPAMIDOL 80 ML: 755 INJECTION, SOLUTION INTRAVENOUS at 14:12

## 2022-06-08 RX ADMIN — ONDANSETRON 4 MG: 2 INJECTION INTRAMUSCULAR; INTRAVENOUS at 11:59

## 2022-06-08 RX ADMIN — SODIUM CHLORIDE 1000 ML: 9 INJECTION, SOLUTION INTRAVENOUS at 12:01

## 2022-06-08 RX ADMIN — KETOROLAC TROMETHAMINE 30 MG: 30 INJECTION, SOLUTION INTRAMUSCULAR at 12:00

## 2022-06-08 ASSESSMENT — PAIN DESCRIPTION - ORIENTATION: ORIENTATION: LEFT

## 2022-06-08 ASSESSMENT — PAIN DESCRIPTION - LOCATION
LOCATION: ABDOMEN;CHEST
LOCATION: ABDOMEN

## 2022-06-08 ASSESSMENT — PAIN - FUNCTIONAL ASSESSMENT: PAIN_FUNCTIONAL_ASSESSMENT: 0-10

## 2022-06-08 ASSESSMENT — PAIN SCALES - GENERAL
PAINLEVEL_OUTOF10: 9
PAINLEVEL_OUTOF10: 7
PAINLEVEL_OUTOF10: 9
PAINLEVEL_OUTOF10: 7

## 2022-06-08 NOTE — ED NOTES
Pt medicated per provider order. Pt remains to be puking and unable to sit still due to abdominal pain.       Bandar Miller RN  06/08/22 7224

## 2022-06-08 NOTE — ED TRIAGE NOTES
Pt to ED from home with complaints of abdominal pain, chest pain and shortness of breath that started 30 minutes prior to arrival. Patient states he was fishing and started preparing a fish when he started getting sick. Patient diaphoretic on arrival. Patient emesis x3 on arrival. Pt reports drinking last night.

## 2022-06-08 NOTE — ED NOTES
On RN arrival to room patient laying on belly and appears to be resting comfortably. After this Rn starts making noise pt rolls around and asks for more pain medication. RN to notify provider.       Мария Diop RN  06/08/22 [id number]

## 2022-06-08 NOTE — ED PROVIDER NOTES
[facility] COMPLAINT       Chief Complaint   Patient presents with    Chest Pain    Shortness of Breath    Abdominal Pain       Nurses Notes reviewed and I agree except as noted in the HPI. HISTORY OF PRESENT ILLNESS    Phillip Gutierrez is a 23 y.o. male. Patient has pain mainly in the mid epigastric area. Does radiate towards the right upper quadrant and left upper quadrant. He has vomited multiple times. At times has gotten sweaty. Radiates somewhat towards the left side of the chest.  Patient states that he is unable to take narcotic pain medicines due to prior addiction. No alcohol use  REVIEW OF SYSTEMS         No fever, no previous abdominal surgeries. Remainder of review of systems is otherwise reviewed as negative. PAST MEDICAL HISTORY    has a past medical history of Psychiatric problem. SURGICAL HISTORY      has no past surgical history on file. CURRENT MEDICATIONS       Discharge Medication List as of 6/8/2022  4:13 PM      CONTINUE these medications which have NOT CHANGED    Details   cloNIDine (CATAPRES) 0.1 MG tablet Take 1 tablet by mouth nightly, Disp-30 tablet, R-0Normal      buPROPion (WELLBUTRIN XL) 150 MG extended release tablet Take 1 tablet by mouth every morning, Disp-30 tablet, R-3Normal      doxyLAMINE succinate (GNP SLEEP AID) 25 MG tablet Take 1 tablet by mouth nightly as needed for Sleep, Disp-30 tablet,R-0Normal      ibuprofen (ADVIL;MOTRIN) 200 MG tablet Take 400 mg by mouth every 6 hours as needed for PainHistorical Med             ALLERGIES     has No Known Allergies. FAMILY HISTORY     is adopted. family history is not on file. He was adopted. SOCIAL HISTORY      reports that he has been smoking cigarettes. He started smoking about 7 years ago. He has been smoking about 0.50 packs per day. He quit smokeless tobacco use about 6 years ago. He reports current drug use.  Frequency: 7.00 times per week. Drug: Marijuana [employer]). He reports that he does not drink alcohol. PHYSICAL EXAM     INITIAL VITALS:  height is 6' (1.829 m) and weight is 180 lb (81.6 kg). His oral temperature is 97.6 °F (36.4 °C). His blood pressure is 166/98 (abnormal) and his pulse is 85. His respiration is 16 and oxygen saturation is 98%. Constitutional: Well appearing and non-toxic   HENT:  Atraumatic appearing  oropharynx moist, no pharyngeal exudates. Neck- normal range of motion,supple   Respiratory:  No wheezing, rhonchi or rales  Cardiovascular: regular, no murmur  GI: generalized tenderness, no rigidity, rebound or guarding  :  No costovertebral angle tenderness   Musculoskeletal:  2/4 distal pulses, no pitting edema  Integument: warm and dry  Neurologic:  Alert & oriented x 3  Psychiatric:  Speech and behavior appropriate          DIAGNOSTIC RESULTS     EKG interpreted by me showing sinus rhythm at a rate of 69, QRS of 102, QTc of 437, axis of 65 with no acute ischemic changes.       LABS:   Labs Reviewed   CBC WITH AUTO DIFFERENTIAL - Abnormal; Notable for the following components:       Result Value    WBC 11.9 (*)     MPV 8.7 (*)     All other components within normal limits   COMPREHENSIVE METABOLIC PANEL W/ REFLEX TO MG FOR LOW K - Abnormal; Notable for the following components:    Glucose 126 (*)     All other components within normal limits   LIPASE - Abnormal; Notable for the following components:    Lipase 178.0 (*)     All other components within normal limits   BRAIN NATRIURETIC PEPTIDE   ETHANOL   URINE DRUG SCREEN   D-DIMER, QUANTITATIVE   ANION GAP   GLOMERULAR FILTRATION RATE, ESTIMATED   MAGNESIUM   OSMOLALITY       EMERGENCY DEPARTMENT COURSE:   Vitals:    Vitals:    06/08/22 1205 06/08/22 1302 06/08/22 1305 06/08/22 1505   BP: (!) 166/98      Pulse: 67   85   Resp:  18 20 16   Temp:       TempSrc:       SpO2: 98% 100% 100% 98%   Weight:       Height:         I note that his lipase is found to be elevated although not markedly so. This could potentially be early pancreatitis. His symptoms are mainly in the epigastric area would be consistent with this. There is no elevation liver transaminases or bilirubins. CT of the abdomen and pelvis with no acute surgical issues. We have given him IV fluids as well as nausea medicine and the vomiting appears to be under control. I am advising him to use clear liquids for now and to advance towards soft foods. Return to ER for worsening symptoms or any new symptoms or concerns. CRITICAL CARE:   none         FINAL IMPRESSION      1.  Abdominal pain, epigastric          DISPOSITION/PLAN   discharged    DISCHARGE MEDICATIONS:  Discharge Medication List as of 6/8/2022  4:13 PM      START taking these medications    Details   dicyclomine (BENTYL) 10 MG capsule Take 1 capsule by mouth 3 times daily as needed (abd pain), Disp-9 capsule, R-0Print      ondansetron (ZOFRAN ODT) 4 MG disintegrating tablet Take 1 tablet by mouth every 8 hours as needed for Nausea, Disp-9 tablet, R-0Print             (Please note that portions of this note were completed with a voice recognition program.  Efforts were made to edit the dictations but occasionally words are mis-transcribed.)    Halie Hua, [address] [place] [address] Esmer,   06/08/22 [id number]

## 2022-06-09 ENCOUNTER — TELEPHONE (OUTPATIENT)
Dept: INTERNAL MEDICINE CLINIC | Age: 27
End: 2022-06-09

## 2023-08-18 NOTE — TELEPHONE ENCOUNTER
LPN returned patients call- unable to leave .
Patient called to verify appt date/time and wanted to speak with someone regarding getting the suboxone shot rather than the tablets. He mentioned that he had done some research on the medication and feels this would be a better route for him. He stated that he had been transferred to the nurse and left a . I let him know that the nurse would give him a call back and he voiced understanding.   Call back # [phone]    DOLV 06/03/2021 DONV 06/17/2021
Detail Level: Zone

## 2024-05-01 ENCOUNTER — HOSPITAL ENCOUNTER (EMERGENCY)
Age: 29
Discharge: HOME | End: 2024-05-01
Attending: FAMILY MEDICINE
Payer: COMMERCIAL

## 2024-05-01 VITALS
HEART RATE: 57 BPM | BODY MASS INDEX: 24.38 KG/M2 | SYSTOLIC BLOOD PRESSURE: 167 MMHG | RESPIRATION RATE: 20 BRPM | DIASTOLIC BLOOD PRESSURE: 95 MMHG | OXYGEN SATURATION: 97 % | WEIGHT: 180 LBS | TEMPERATURE: 97.5 F | HEIGHT: 72 IN

## 2024-05-01 DIAGNOSIS — K29.21 ACUTE ALCOHOLIC GASTRITIS WITH HEMORRHAGE: ICD-10-CM

## 2024-05-01 DIAGNOSIS — K85.00 IDIOPATHIC ACUTE PANCREATITIS, UNSPECIFIED COMPLICATION STATUS: Primary | ICD-10-CM

## 2024-05-01 LAB
ALBUMIN SERPL BCG-MCNC: 4.4 G/DL (ref 3.5–5.1)
ALP SERPL-CCNC: 112 U/L (ref 38–126)
ALT SERPL W/O P-5'-P-CCNC: 25 U/L (ref 11–66)
ANION GAP SERPL CALC-SCNC: 15 MEQ/L (ref 8–16)
AST SERPL-CCNC: 24 U/L (ref 5–40)
BASOPHILS ABSOLUTE: 0 THOU/MM3 (ref 0–0.1)
BASOPHILS NFR BLD AUTO: 0.2 %
BILIRUB CONJ SERPL-MCNC: 0.3 MG/DL (ref 0–0.3)
BILIRUB SERPL-MCNC: 1.2 MG/DL (ref 0.3–1.2)
BUN SERPL-MCNC: 13 MG/DL (ref 7–22)
CALCIUM SERPL-MCNC: 9.2 MG/DL (ref 8.5–10.5)
CHLORIDE SERPL-SCNC: 100 MEQ/L (ref 98–111)
CO2 SERPL-SCNC: 22 MEQ/L (ref 23–33)
CREAT SERPL-MCNC: 0.6 MG/DL (ref 0.4–1.2)
DEPRECATED RDW RBC AUTO: 42.7 FL (ref 35–45)
EOSINOPHIL NFR BLD AUTO: 0.1 %
EOSINOPHILS ABSOLUTE: 0 THOU/MM3 (ref 0–0.4)
ERYTHROCYTE [DISTWIDTH] IN BLOOD BY AUTOMATED COUNT: 12.9 % (ref 11.5–14.5)
GFR SERPL CREATININE-BSD FRML MDRD: > 90 ML/MIN/1.73M2
GLUCOSE SERPL-MCNC: 139 MG/DL (ref 70–108)
HCT VFR BLD AUTO: 45.2 % (ref 42–52)
HGB BLD-MCNC: 16 GM/DL (ref 14–18)
IMM GRANULOCYTES # BLD AUTO: 0.07 THOU/MM3 (ref 0–0.07)
IMM GRANULOCYTES NFR BLD AUTO: 0.5 %
LIPASE SERPL-CCNC: 564.5 U/L (ref 5.6–51.3)
LYMPHOCYTES ABSOLUTE: 0.9 THOU/MM3 (ref 1–4.8)
LYMPHOCYTES NFR BLD AUTO: 5.7 %
MAGNESIUM SERPL-MCNC: 2 MG/DL (ref 1.6–2.4)
MCH RBC QN AUTO: 32.5 PG (ref 26–33)
MCHC RBC AUTO-ENTMCNC: 35.4 GM/DL (ref 32.2–35.5)
MCV RBC AUTO: 91.7 FL (ref 80–94)
MONOCYTES ABSOLUTE: 0.8 THOU/MM3 (ref 0.4–1.3)
MONOCYTES NFR BLD AUTO: 5 %
NEUTROPHILS ABSOLUTE: 13.5 THOU/MM3 (ref 1.8–7.7)
NEUTROPHILS NFR BLD AUTO: 88.5 %
NRBC BLD AUTO-RTO: 0 /100 WBC
OSMOLALITY SERPL CALC.SUM OF ELEC: 276.2 MOSMOL/KG (ref 275–300)
PLATELET # BLD AUTO: 320 THOU/MM3 (ref 130–400)
PMV BLD AUTO: 8.7 FL (ref 9.4–12.4)
POTASSIUM SERPL-SCNC: 3.8 MEQ/L (ref 3.5–5.2)
PROT SERPL-MCNC: 8.3 G/DL (ref 6.1–8)
RBC # BLD AUTO: 4.93 MILL/MM3 (ref 4.7–6.1)
SODIUM SERPL-SCNC: 137 MEQ/L (ref 135–145)
WBC # BLD AUTO: 15.3 THOU/MM3 (ref 4.8–10.8)

## 2024-05-01 PROCEDURE — 82248 BILIRUBIN DIRECT: CPT

## 2024-05-01 PROCEDURE — 36415 COLL VENOUS BLD VENIPUNCTURE: CPT

## 2024-05-01 PROCEDURE — 99284 EMERGENCY DEPT VISIT MOD MDM: CPT

## 2024-05-01 PROCEDURE — 80053 COMPREHEN METABOLIC PANEL: CPT

## 2024-05-01 PROCEDURE — 83735 ASSAY OF MAGNESIUM: CPT

## 2024-05-01 PROCEDURE — 85025 COMPLETE CBC W/AUTO DIFF WBC: CPT

## 2024-05-01 PROCEDURE — 96375 TX/PRO/DX INJ NEW DRUG ADDON: CPT

## 2024-05-01 PROCEDURE — 6360000002 HC RX W HCPCS: Performed by: FAMILY MEDICINE

## 2024-05-01 PROCEDURE — 2580000003 HC RX 258: Performed by: FAMILY MEDICINE

## 2024-05-01 PROCEDURE — 96376 TX/PRO/DX INJ SAME DRUG ADON: CPT

## 2024-05-01 PROCEDURE — 83690 ASSAY OF LIPASE: CPT

## 2024-05-01 PROCEDURE — 96374 THER/PROPH/DIAG INJ IV PUSH: CPT

## 2024-05-01 PROCEDURE — 2500000003 HC RX 250 WO HCPCS: Performed by: FAMILY MEDICINE

## 2024-05-01 PROCEDURE — A4216 STERILE WATER/SALINE, 10 ML: HCPCS | Performed by: FAMILY MEDICINE

## 2024-05-01 RX ORDER — MORPHINE SULFATE 2 MG/ML
2 INJECTION, SOLUTION INTRAMUSCULAR; INTRAVENOUS ONCE
Status: COMPLETED | OUTPATIENT
Start: 2024-05-01 | End: 2024-05-01

## 2024-05-01 RX ORDER — BUPRENORPHINE AND NALOXONE 8; 2 MG/1; MG/1
1 FILM, SOLUBLE BUCCAL; SUBLINGUAL DAILY
COMMUNITY

## 2024-05-01 RX ORDER — 0.9 % SODIUM CHLORIDE 0.9 %
1000 INTRAVENOUS SOLUTION INTRAVENOUS ONCE
Status: COMPLETED | OUTPATIENT
Start: 2024-05-01 | End: 2024-05-01

## 2024-05-01 RX ORDER — ONDANSETRON 2 MG/ML
4 INJECTION INTRAMUSCULAR; INTRAVENOUS ONCE
Status: COMPLETED | OUTPATIENT
Start: 2024-05-01 | End: 2024-05-01

## 2024-05-01 RX ADMIN — MORPHINE SULFATE 2 MG: 2 INJECTION, SOLUTION INTRAMUSCULAR; INTRAVENOUS at 15:47

## 2024-05-01 RX ADMIN — ONDANSETRON 4 MG: 2 INJECTION INTRAMUSCULAR; INTRAVENOUS at 14:27

## 2024-05-01 RX ADMIN — MORPHINE SULFATE 2 MG: 2 INJECTION, SOLUTION INTRAMUSCULAR; INTRAVENOUS at 14:28

## 2024-05-01 RX ADMIN — FAMOTIDINE 20 MG: 10 INJECTION, SOLUTION INTRAVENOUS at 14:28

## 2024-05-01 RX ADMIN — SODIUM CHLORIDE 1000 ML: 9 INJECTION, SOLUTION INTRAVENOUS at 15:46

## 2024-05-01 ASSESSMENT — PAIN SCALES - GENERAL: PAINLEVEL_OUTOF10: 10

## 2024-05-01 ASSESSMENT — PAIN DESCRIPTION - ORIENTATION: ORIENTATION: RIGHT;MID

## 2024-05-01 ASSESSMENT — PAIN - FUNCTIONAL ASSESSMENT: PAIN_FUNCTIONAL_ASSESSMENT: 0-10

## 2024-05-01 ASSESSMENT — PAIN DESCRIPTION - LOCATION: LOCATION: ABDOMEN

## 2024-05-01 NOTE — ED TRIAGE NOTES
Pt presents to the ED with concern for pancreatis. Pt states he woke up this morning with the pain and its 10/10. PT reports he drank half of a bottle of fifth last night. Pt is dry heaving into emesis bag. Pt  respirations are even and unlabored. Pt states pain is mid upper abdominal pain.

## 2024-05-01 NOTE — ED PROVIDER NOTES
EMERGENCY DEPARTMENT ENCOUNTER     CHIEF COMPLAINT   Chief Complaint   Patient presents with    Abdominal Pain        HPI   Nick Eckert is a 28 y.o. male with a hx of drinking one-fifth liquor daily, who presents with acute upper abdominal pain, onset was last evening. The duration has been constant since the onset.  The patient has associated nausea but denies dark tarry stool. Pt denies any hx of variceal bleeding.  There are no alleviating factors.  The context is that the symptoms started spontaneously, without any known precipitants.    Pt has had prior episodes of pancreatitis.    PAST MEDICAL & SURGICAL HISTORY   Past Medical History:   Diagnosis Date    Psychiatric problem      History reviewed. No pertinent surgical history.     CURRENT MEDICATIONS   Current Outpatient Rx   Medication Sig Dispense Refill    buprenorphine-naloxone (SUBOXONE) 8-2 MG FILM SL film Place 1 Film under the tongue daily. Max Daily Amount: 1 Film      dicyclomine (BENTYL) 10 MG capsule Take 1 capsule by mouth 3 times daily as needed (abd pain) 9 capsule 0    cloNIDine (CATAPRES) 0.1 MG tablet Take 1 tablet by mouth nightly 30 tablet 0    buPROPion (WELLBUTRIN XL) 150 MG extended release tablet Take 1 tablet by mouth every morning 30 tablet 3    doxyLAMINE succinate (GNP SLEEP AID) 25 MG tablet Take 1 tablet by mouth nightly as needed for Sleep 30 tablet 0    ibuprofen (ADVIL;MOTRIN) 200 MG tablet Take 400 mg by mouth every 6 hours as needed for Pain          ALLERGIES   No Known Allergies     SOCIAL AND FAMILY HISTORY   Social History     Socioeconomic History    Marital status: Single     Spouse name: None    Number of children: 0    Years of education: 12    Highest education level: None   Tobacco Use    Smoking status: Every Day     Current packs/day: 0.50     Average packs/day: 0.5 packs/day for 9.6 years (4.8 ttl pk-yrs)     Types: Cigarettes     Start date: 9/17/2014    Smokeless tobacco: Former     Quit date: 9/17/2015

## 2024-05-09 ENCOUNTER — OFFICE VISIT (OUTPATIENT)
Dept: FAMILY MEDICINE CLINIC | Age: 29
End: 2024-05-09

## 2024-05-09 VITALS
HEART RATE: 87 BPM | RESPIRATION RATE: 16 BRPM | DIASTOLIC BLOOD PRESSURE: 86 MMHG | HEIGHT: 72 IN | BODY MASS INDEX: 24.92 KG/M2 | TEMPERATURE: 98.2 F | WEIGHT: 184 LBS | SYSTOLIC BLOOD PRESSURE: 152 MMHG | OXYGEN SATURATION: 97 %

## 2024-05-09 DIAGNOSIS — G57.91 NEUROPATHY OF RIGHT FOOT: ICD-10-CM

## 2024-05-09 DIAGNOSIS — K29.00 ACUTE GASTRITIS, PRESENCE OF BLEEDING UNSPECIFIED, UNSPECIFIED GASTRITIS TYPE: ICD-10-CM

## 2024-05-09 DIAGNOSIS — F11.20 UNCOMPLICATED OPIOID DEPENDENCE (HCC): ICD-10-CM

## 2024-05-09 DIAGNOSIS — Z00.01 ENCOUNTER FOR GENERAL ADULT MEDICAL EXAMINATION WITH ABNORMAL FINDINGS: ICD-10-CM

## 2024-05-09 DIAGNOSIS — Z87.891 PERSONAL HISTORY OF TOBACCO USE, PRESENTING HAZARDS TO HEALTH: ICD-10-CM

## 2024-05-09 DIAGNOSIS — Z00.00 ENCOUNTER FOR MEDICAL EXAMINATION TO ESTABLISH CARE: Primary | ICD-10-CM

## 2024-05-09 DIAGNOSIS — R10.30 LOWER ABDOMINAL PAIN: ICD-10-CM

## 2024-05-09 DIAGNOSIS — Z87.19 HX OF ACUTE PANCREATITIS: ICD-10-CM

## 2024-05-09 DIAGNOSIS — R11.0 NAUSEA: ICD-10-CM

## 2024-05-09 RX ORDER — OMEPRAZOLE 20 MG/1
20 CAPSULE, DELAYED RELEASE ORAL DAILY
Qty: 90 CAPSULE | Refills: 1 | Status: SHIPPED | OUTPATIENT
Start: 2024-05-09

## 2024-05-09 RX ORDER — SUCRALFATE 1 G/1
1 TABLET ORAL 4 TIMES DAILY
Qty: 120 TABLET | Refills: 3 | Status: SHIPPED | OUTPATIENT
Start: 2024-05-09

## 2024-05-09 RX ORDER — FAMOTIDINE 20 MG/1
20 TABLET, FILM COATED ORAL NIGHTLY PRN
Qty: 90 TABLET | Refills: 1 | Status: SHIPPED | OUTPATIENT
Start: 2024-05-09

## 2024-05-09 SDOH — ECONOMIC STABILITY: HOUSING INSECURITY
IN THE LAST 12 MONTHS, WAS THERE A TIME WHEN YOU DID NOT HAVE A STEADY PLACE TO SLEEP OR SLEPT IN A SHELTER (INCLUDING NOW)?: NO

## 2024-05-09 SDOH — ECONOMIC STABILITY: FOOD INSECURITY: WITHIN THE PAST 12 MONTHS, YOU WORRIED THAT YOUR FOOD WOULD RUN OUT BEFORE YOU GOT MONEY TO BUY MORE.: NEVER TRUE

## 2024-05-09 SDOH — ECONOMIC STABILITY: FOOD INSECURITY: WITHIN THE PAST 12 MONTHS, THE FOOD YOU BOUGHT JUST DIDN'T LAST AND YOU DIDN'T HAVE MONEY TO GET MORE.: NEVER TRUE

## 2024-05-09 SDOH — ECONOMIC STABILITY: INCOME INSECURITY: HOW HARD IS IT FOR YOU TO PAY FOR THE VERY BASICS LIKE FOOD, HOUSING, MEDICAL CARE, AND HEATING?: NOT HARD AT ALL

## 2024-05-09 ASSESSMENT — PATIENT HEALTH QUESTIONNAIRE - PHQ9
6. FEELING BAD ABOUT YOURSELF - OR THAT YOU ARE A FAILURE OR HAVE LET YOURSELF OR YOUR FAMILY DOWN: NOT AT ALL
SUM OF ALL RESPONSES TO PHQ QUESTIONS 1-9: 6
7. TROUBLE CONCENTRATING ON THINGS, SUCH AS READING THE NEWSPAPER OR WATCHING TELEVISION: NOT AT ALL
4. FEELING TIRED OR HAVING LITTLE ENERGY: MORE THAN HALF THE DAYS
8. MOVING OR SPEAKING SO SLOWLY THAT OTHER PEOPLE COULD HAVE NOTICED. OR THE OPPOSITE, BEING SO FIGETY OR RESTLESS THAT YOU HAVE BEEN MOVING AROUND A LOT MORE THAN USUAL: SEVERAL DAYS
2. FEELING DOWN, DEPRESSED OR HOPELESS: NOT AT ALL
SUM OF ALL RESPONSES TO PHQ QUESTIONS 1-9: 6
3. TROUBLE FALLING OR STAYING ASLEEP: SEVERAL DAYS
SUM OF ALL RESPONSES TO PHQ9 QUESTIONS 1 & 2: 0
10. IF YOU CHECKED OFF ANY PROBLEMS, HOW DIFFICULT HAVE THESE PROBLEMS MADE IT FOR YOU TO DO YOUR WORK, TAKE CARE OF THINGS AT HOME, OR GET ALONG WITH OTHER PEOPLE: NOT DIFFICULT AT ALL
SUM OF ALL RESPONSES TO PHQ QUESTIONS 1-9: 6
SUM OF ALL RESPONSES TO PHQ QUESTIONS 1-9: 6
1. LITTLE INTEREST OR PLEASURE IN DOING THINGS: NOT AT ALL
5. POOR APPETITE OR OVEREATING: MORE THAN HALF THE DAYS
9. THOUGHTS THAT YOU WOULD BE BETTER OFF DEAD, OR OF HURTING YOURSELF: NOT AT ALL

## 2024-05-09 ASSESSMENT — ENCOUNTER SYMPTOMS
DIARRHEA: 0
RECTAL PAIN: 0
NAUSEA: 1
BACK PAIN: 0
RESPIRATORY NEGATIVE: 1
CONSTIPATION: 1
ABDOMINAL PAIN: 1
VOMITING: 0
ABDOMINAL DISTENTION: 0

## 2024-05-09 NOTE — PROGRESS NOTES
Well Adult Note  Name: Nick Eckert Today’s Date: 2024   MRN: [id number] Sex: Male   Age: 28 y.o. Ethnicity: Non- / Non    : 1995 Race: White (non-)      Nick Eckert is here for well adult exam.  History:      Pt here to establish care.    Pt Hx of drug abuse, current with opoid addiction Tx with suboxone,  Sees an online provider for this  Had an injury in high school and became addicted to pain meds.    Has been having episodes of gastric pain off and on for 2 years  States he has AM nausea with this,  Did go to  recently had an elevated lipase level and dx with pancreatitis  , given IV hydration and pain meds and symptoms resolved.   -no weight loss  -any food make his stomach hurt  -no diarrhea or vomiting    -also had a laceration to bottom of right foot in  and now has burning  in his right foot after working all night  -it comes and goes  -denies swelling of his right foot     Adopted does not know his family history     Wants FMLA for gastric issues     Smokes 1/2 ppd not ready to quit yet       Lab Results   Component Value Date    WBC 15.3 (H) 2024    HGB 16.0 2024    HCT 45.2 2024    MCV 91.7 2024     2024        Lab Results   Component Value Date     2024    K 3.8 2024     2024    CO2 22 (L) 2024    BUN 13 2024    CREATININE 0.6 2024    GLUCOSE 139 (H) 2024    CALCIUM 9.2 2024    BILITOT 1.2 2024    ALKPHOS 112 2024    AST 24 2024    ALT 25 2024    LABGLOM > 90 2024          Lab Results   Component Value Date    LIPASE 564.5 (H) 2024      Review of Systems   Constitutional:  Negative for chills, fatigue and fever.   HENT: Negative.     Respiratory: Negative.     Cardiovascular: Negative.    Gastrointestinal:  Positive for abdominal pain, constipation and nausea. Negative for abdominal distention, diarrhea, rectal pain and

## 2024-05-13 ENCOUNTER — PATIENT MESSAGE (OUTPATIENT)
Dept: FAMILY MEDICINE CLINIC | Age: 29
End: 2024-05-13

## 2024-07-24 ENCOUNTER — OFFICE VISIT (OUTPATIENT)
Dept: FAMILY MEDICINE CLINIC | Age: 29
End: 2024-07-24
Payer: COMMERCIAL

## 2024-07-24 VITALS
BODY MASS INDEX: 24.3 KG/M2 | DIASTOLIC BLOOD PRESSURE: 84 MMHG | TEMPERATURE: 98 F | HEIGHT: 72 IN | OXYGEN SATURATION: 98 % | SYSTOLIC BLOOD PRESSURE: 158 MMHG | RESPIRATION RATE: 16 BRPM | HEART RATE: 89 BPM | WEIGHT: 179.4 LBS

## 2024-07-24 DIAGNOSIS — M79.671 RIGHT FOOT PAIN: Primary | ICD-10-CM

## 2024-07-24 DIAGNOSIS — K21.9 GASTROESOPHAGEAL REFLUX DISEASE, UNSPECIFIED WHETHER ESOPHAGITIS PRESENT: ICD-10-CM

## 2024-07-24 DIAGNOSIS — F19.94 SUBSTANCE INDUCED MOOD DISORDER (HCC): ICD-10-CM

## 2024-07-24 DIAGNOSIS — F32.A DEPRESSION, UNSPECIFIED DEPRESSION TYPE: ICD-10-CM

## 2024-07-24 DIAGNOSIS — G57.91 NEUROPATHY OF RIGHT FOOT: ICD-10-CM

## 2024-07-24 PROCEDURE — 99214 OFFICE O/P EST MOD 30 MIN: CPT | Performed by: NURSE PRACTITIONER

## 2024-07-24 RX ORDER — NAPROXEN 375 MG/1
375 TABLET ORAL 2 TIMES DAILY WITH MEALS
Qty: 180 TABLET | Refills: 1 | Status: SHIPPED | OUTPATIENT
Start: 2024-07-24

## 2024-07-24 ASSESSMENT — ENCOUNTER SYMPTOMS
DIARRHEA: 0
NAUSEA: 1
RESPIRATORY NEGATIVE: 1
RECTAL PAIN: 0
VOMITING: 0
BACK PAIN: 0
ABDOMINAL DISTENTION: 0
CONSTIPATION: 1
ABDOMINAL PAIN: 1

## 2024-07-24 NOTE — PROGRESS NOTES
Well Adult Note  Name: Nick Eckert Today’s Date: 2024   MRN: [id number] Sex: Male   Age: 28 y.o. Ethnicity: Non- / Non    : 1995 Race: White (non-)      Nick Eckert is here for well adult exam.  History:      Pt here for a 2 month f/u .        Pt Hx of drug abuse, current with opoid addiction Tx with suboxone,  Sees an online provider for this  Had an injury in high school and became addicted to pain meds.    Has been having episodes of gastric pain off and on for 2 years  States he has AM nausea with this,  Did go to  recently had an elevated lipase level and dx with pancreatitis  , given IV hydration and pain meds and symptoms resolved.   -no weight loss  -any food make his stomach hurt  -no diarrhea or vomiting  -started on carafate, prilosec and pepcid at last  visit and  referred to GI for f/u , had to reschedule his appt    -also had a laceration to bottom of right foot in  and now has burning  in his right foot after working all night  -it comes and goes  -denies swelling of his right foot   -given FMLA for it at his last visit and voltaren gel along with  custom molded shoe inserts.  -has not gotten the custom molded shoe inserts yet  -states the voltaren gel helps for a few hours each shift  but pain returns  -discussed elavil and gabapentin but pt declines, will start naprosyn     Adopted does not know his family history     Wants FMLA for gastric issues     Smokes 1/2 ppd not ready to quit yet       Lab Results   Component Value Date    WBC 15.3 (H) 2024    HGB 16.0 2024    HCT 45.2 2024    MCV 91.7 2024     2024        Lab Results   Component Value Date     2024    K 3.8 2024     2024    CO2 22 (L) 2024    BUN 13 2024    CREATININE 0.6 2024    GLUCOSE 139 (H) 2024    CALCIUM 9.2 2024    BILITOT 1.2 2024    ALKPHOS 112 2024    AST 24 2024

## 2024-08-19 ENCOUNTER — OFFICE VISIT (OUTPATIENT)
Dept: FAMILY MEDICINE CLINIC | Age: 29
End: 2024-08-19
Payer: COMMERCIAL

## 2024-08-19 VITALS
BODY MASS INDEX: 24.62 KG/M2 | HEART RATE: 89 BPM | DIASTOLIC BLOOD PRESSURE: 82 MMHG | RESPIRATION RATE: 18 BRPM | WEIGHT: 181.8 LBS | HEIGHT: 72 IN | OXYGEN SATURATION: 97 % | TEMPERATURE: 98.5 F | SYSTOLIC BLOOD PRESSURE: 148 MMHG

## 2024-08-19 DIAGNOSIS — K21.9 GASTROESOPHAGEAL REFLUX DISEASE WITHOUT ESOPHAGITIS: ICD-10-CM

## 2024-08-19 DIAGNOSIS — G57.91 NEUROPATHY OF RIGHT FOOT: Primary | ICD-10-CM

## 2024-08-19 DIAGNOSIS — F32.A DEPRESSION, UNSPECIFIED DEPRESSION TYPE: ICD-10-CM

## 2024-08-19 DIAGNOSIS — F19.94 SUBSTANCE INDUCED MOOD DISORDER (HCC): ICD-10-CM

## 2024-08-19 PROCEDURE — 99214 OFFICE O/P EST MOD 30 MIN: CPT | Performed by: NURSE PRACTITIONER

## 2024-08-19 RX ORDER — GABAPENTIN 100 MG/1
100 CAPSULE ORAL NIGHTLY
Qty: 90 CAPSULE | Refills: 1 | Status: SHIPPED | OUTPATIENT
Start: 2024-08-19 | End: 2025-02-15

## 2024-08-19 ASSESSMENT — ENCOUNTER SYMPTOMS
RESPIRATORY NEGATIVE: 1
DIARRHEA: 0
VOMITING: 0
NAUSEA: 1
RECTAL PAIN: 0
BACK PAIN: 0
CONSTIPATION: 1
ABDOMINAL PAIN: 1
ABDOMINAL DISTENTION: 0

## 2024-11-30 ENCOUNTER — HOSPITAL ENCOUNTER (EMERGENCY)
Age: 29
Discharge: HOME | End: 2024-11-30
Payer: COMMERCIAL

## 2024-11-30 VITALS
HEART RATE: 97 BPM | DIASTOLIC BLOOD PRESSURE: 108 MMHG | OXYGEN SATURATION: 99 % | SYSTOLIC BLOOD PRESSURE: 160 MMHG | RESPIRATION RATE: 18 BRPM | TEMPERATURE: 97.6 F

## 2024-11-30 DIAGNOSIS — K04.7 DENTAL INFECTION: Primary | ICD-10-CM

## 2024-11-30 PROCEDURE — 99213 OFFICE O/P EST LOW 20 MIN: CPT | Performed by: NURSE PRACTITIONER

## 2024-11-30 PROCEDURE — 99213 OFFICE O/P EST LOW 20 MIN: CPT

## 2024-11-30 RX ORDER — KETOROLAC TROMETHAMINE 10 MG/1
10 TABLET, FILM COATED ORAL EVERY 6 HOURS PRN
Qty: 20 TABLET | Refills: 0 | Status: SHIPPED | OUTPATIENT
Start: 2024-11-30

## 2024-11-30 ASSESSMENT — PAIN SCALES - GENERAL: PAINLEVEL_OUTOF10: 9

## 2024-11-30 ASSESSMENT — PAIN DESCRIPTION - PAIN TYPE: TYPE: CHRONIC PAIN

## 2024-11-30 ASSESSMENT — PAIN DESCRIPTION - ONSET: ONSET: ON-GOING

## 2024-11-30 ASSESSMENT — PAIN - FUNCTIONAL ASSESSMENT
PAIN_FUNCTIONAL_ASSESSMENT: ACTIVITIES ARE NOT PREVENTED
PAIN_FUNCTIONAL_ASSESSMENT: 0-10

## 2024-11-30 ASSESSMENT — LIFESTYLE VARIABLES
HOW MANY STANDARD DRINKS CONTAINING ALCOHOL DO YOU HAVE ON A TYPICAL DAY: 10 OR MORE
HOW OFTEN DO YOU HAVE A DRINK CONTAINING ALCOHOL: 4 OR MORE TIMES A WEEK

## 2024-11-30 ASSESSMENT — PAIN DESCRIPTION - FREQUENCY: FREQUENCY: INTERMITTENT

## 2024-11-30 ASSESSMENT — PAIN DESCRIPTION - DESCRIPTORS: DESCRIPTORS: ACHING;STABBING;THROBBING

## 2024-11-30 ASSESSMENT — PAIN DESCRIPTION - ORIENTATION: ORIENTATION: LEFT;LOWER

## 2024-11-30 NOTE — ED PROVIDER NOTES
[facility] CARE [place]  UrgentCare Encounter      CHIEFCOMPLAINT       Chief Complaint   Patient presents with    Dental Pain     Left lower molar is broken and has been causing pain and headaches for 6 months        Nurses Notes reviewed and I agree except as noted in the HPI.  HISTORY OF PRESENT ILLNESS   Nick Eckert is a 29 y.o. male who presents to urgent care with cough of left lower molar pain.  He states that he has had a broken molar for a while but the pain got worse over the last several days.    REVIEW OF SYSTEMS     Review of Systems   HENT:  Positive for dental problem.        PAST MEDICAL HISTORY         Diagnosis Date    Gastroesophageal reflux disease without esophagitis 8/19/2024    Psychiatric problem        SURGICAL HISTORY     Patient  has no past surgical history on file.    CURRENT MEDICATIONS       Discharge Medication List as of 11/30/2024  5:46 PM        CONTINUE these medications which have NOT CHANGED    Details   gabapentin (NEURONTIN) 100 MG capsule Take 1 capsule by mouth at bedtime for 180 days. Intended supply: 90 days, Disp-90 capsule, R-1Normal      omeprazole (PRILOSEC) 20 MG delayed release capsule Take 1 capsule by mouth Daily, Disp-90 capsule, R-1Normal      sucralfate (CARAFATE) 1 GM tablet Take 1 tablet by mouth 4 times daily, Disp-120 tablet, R-3Normal      famotidine (PEPCID) 20 MG tablet Take 1 tablet by mouth nightly as needed (at hs), Disp-90 tablet, R-1Normal      diclofenac sodium (VOLTAREN) 1 % GEL Apply 2 g topically 2 times daily, Topical, 2 TIMES DAILY Starting Thu 5/9/2024, Disp-150 g, R-3, Normal      buprenorphine-naloxone (SUBOXONE) 8-2 MG FILM SL film Place 1 Film under the tongue daily.Historical Med      cloNIDine (CATAPRES) 0.1 MG tablet Take 1 tablet by mouth nightly, Disp-30 tablet, R-0Normal      doxyLAMINE succinate (GNP SLEEP AID) 25 MG tablet Take 1 tablet by mouth nightly as needed for Sleep, Disp-30 tablet,R-0Normal

## 2024-12-04 ENCOUNTER — TELEPHONE (OUTPATIENT)
Dept: FAMILY MEDICINE CLINIC | Age: 29
End: 2024-12-04

## 2024-12-04 ENCOUNTER — TELEMEDICINE (OUTPATIENT)
Dept: FAMILY MEDICINE CLINIC | Age: 29
End: 2024-12-04
Payer: COMMERCIAL

## 2024-12-04 DIAGNOSIS — G57.91 NEUROPATHY OF RIGHT FOOT: Primary | ICD-10-CM

## 2024-12-04 DIAGNOSIS — K29.00 ACUTE GASTRITIS, PRESENCE OF BLEEDING UNSPECIFIED, UNSPECIFIED GASTRITIS TYPE: ICD-10-CM

## 2024-12-04 DIAGNOSIS — R11.0 NAUSEA: ICD-10-CM

## 2024-12-04 DIAGNOSIS — Z87.19 HX OF ACUTE PANCREATITIS: ICD-10-CM

## 2024-12-04 DIAGNOSIS — Z72.0 TOBACCO ABUSE: ICD-10-CM

## 2024-12-04 DIAGNOSIS — F19.10 SUBSTANCE ABUSE (HCC): ICD-10-CM

## 2024-12-04 DIAGNOSIS — F10.10 ALCOHOL ABUSE: ICD-10-CM

## 2024-12-04 DIAGNOSIS — F11.20 UNCOMPLICATED OPIOID DEPENDENCE (HCC): ICD-10-CM

## 2024-12-04 PROCEDURE — 99214 OFFICE O/P EST MOD 30 MIN: CPT | Performed by: NURSE PRACTITIONER

## 2024-12-04 RX ORDER — FAMOTIDINE 20 MG/1
20 TABLET, FILM COATED ORAL NIGHTLY PRN
Qty: 90 TABLET | Refills: 1 | Status: SHIPPED | OUTPATIENT
Start: 2024-12-04

## 2024-12-04 RX ORDER — GABAPENTIN 100 MG/1
100 CAPSULE ORAL 2 TIMES DAILY
Qty: 90 CAPSULE | Refills: 1 | Status: SHIPPED | OUTPATIENT
Start: 2024-12-04 | End: 2025-06-02

## 2024-12-04 ASSESSMENT — ENCOUNTER SYMPTOMS
RESPIRATORY NEGATIVE: 1
GASTROINTESTINAL NEGATIVE: 1

## 2024-12-04 NOTE — TELEPHONE ENCOUNTER
Future Appointments   Date Time Provider Department Center   3/4/2025  8:20 AM Deonte Turner, JOHNATHAN - CNP [facility] Med [facility] DEP

## 2024-12-04 NOTE — PROGRESS NOTES
ready to quit at this time   Controlled substances monitoring: possible medication side effects, risk of tolerance and/or dependence, and alternative treatments discussed, no signs of potential drug abuse or diversion identified, and [place]AR[place] report reviewed today- activity consistent with treatment plan.     Return in about 3 months (around 3/4/2025) for f/u neuropathy.    Nick Eckert, was evaluated through a synchronous (real-time) audio-video encounter. The patient (or guardian if applicable) is aware that this is a billable service, which includes applicable co-pays. This Virtual Visit was conducted with patient's (and/or legal guardian's) consent. Patient identification was verified, and a caregiver was present when appropriate.   The patient was located at Home: [address] [place], [place] [id number]  Provider was located at Facility (Appt Dept): Pending sale to [facility] [address] [address]  Confirm you are appropriately licensed, registered, or certified to deliver care in the [facility] where the patient is located as indicated above. If you are not or unsure, please re-schedule the visit: Yes, I confirm.       Total time spent on this encounter: Not billed by time    --Deonte Turner, JOHNATHAN - CNP on 12/4/2024 at 9:44 AM    An electronic signature was used to authenticate this note.

## 2025-02-24 ENCOUNTER — OFFICE VISIT (OUTPATIENT)
Dept: FAMILY MEDICINE CLINIC | Age: 30
End: 2025-02-24
Payer: COMMERCIAL

## 2025-02-24 VITALS
TEMPERATURE: 97.8 F | BODY MASS INDEX: 27.21 KG/M2 | DIASTOLIC BLOOD PRESSURE: 86 MMHG | HEIGHT: 72 IN | RESPIRATION RATE: 16 BRPM | OXYGEN SATURATION: 97 % | SYSTOLIC BLOOD PRESSURE: 138 MMHG | HEART RATE: 102 BPM | WEIGHT: 200.9 LBS

## 2025-02-24 DIAGNOSIS — Z13.31 POSITIVE SCREENING FOR DEPRESSION ON 9-ITEM PATIENT HEALTH QUESTIONNAIRE (PHQ-9): ICD-10-CM

## 2025-02-24 DIAGNOSIS — L72.9 SCROTAL CYST: Primary | ICD-10-CM

## 2025-02-24 DIAGNOSIS — K21.9 GASTROESOPHAGEAL REFLUX DISEASE WITHOUT ESOPHAGITIS: ICD-10-CM

## 2025-02-24 DIAGNOSIS — F32.A DEPRESSION, UNSPECIFIED DEPRESSION TYPE: ICD-10-CM

## 2025-02-24 DIAGNOSIS — G57.91 NEUROPATHY OF RIGHT FOOT: ICD-10-CM

## 2025-02-24 DIAGNOSIS — F41.9 ANXIETY: ICD-10-CM

## 2025-02-24 PROBLEM — G62.9 NEUROPATHY: Status: ACTIVE | Noted: 2025-02-24

## 2025-02-24 PROCEDURE — 99214 OFFICE O/P EST MOD 30 MIN: CPT | Performed by: NURSE PRACTITIONER

## 2025-02-24 RX ORDER — GABAPENTIN 100 MG/1
100 CAPSULE ORAL 2 TIMES DAILY
Qty: 90 CAPSULE | Refills: 1 | Status: SHIPPED | OUTPATIENT
Start: 2025-02-24 | End: 2025-08-23

## 2025-02-24 SDOH — ECONOMIC STABILITY: FOOD INSECURITY: WITHIN THE PAST 12 MONTHS, THE FOOD YOU BOUGHT JUST DIDN'T LAST AND YOU DIDN'T HAVE MONEY TO GET MORE.: NEVER TRUE

## 2025-02-24 SDOH — ECONOMIC STABILITY: FOOD INSECURITY: WITHIN THE PAST 12 MONTHS, YOU WORRIED THAT YOUR FOOD WOULD RUN OUT BEFORE YOU GOT MONEY TO BUY MORE.: NEVER TRUE

## 2025-02-24 ASSESSMENT — PATIENT HEALTH QUESTIONNAIRE - PHQ9
1. LITTLE INTEREST OR PLEASURE IN DOING THINGS: MORE THAN HALF THE DAYS
5. POOR APPETITE OR OVEREATING: NOT AT ALL
6. FEELING BAD ABOUT YOURSELF - OR THAT YOU ARE A FAILURE OR HAVE LET YOURSELF OR YOUR FAMILY DOWN: NOT AT ALL
SUM OF ALL RESPONSES TO PHQ QUESTIONS 1-9: 11
SUM OF ALL RESPONSES TO PHQ QUESTIONS 1-9: 11
9. THOUGHTS THAT YOU WOULD BE BETTER OFF DEAD, OR OF HURTING YOURSELF: NOT AT ALL
SUM OF ALL RESPONSES TO PHQ QUESTIONS 1-9: 11
10. IF YOU CHECKED OFF ANY PROBLEMS, HOW DIFFICULT HAVE THESE PROBLEMS MADE IT FOR YOU TO DO YOUR WORK, TAKE CARE OF THINGS AT HOME, OR GET ALONG WITH OTHER PEOPLE: SOMEWHAT DIFFICULT
2. FEELING DOWN, DEPRESSED OR HOPELESS: NOT AT ALL
8. MOVING OR SPEAKING SO SLOWLY THAT OTHER PEOPLE COULD HAVE NOTICED. OR THE OPPOSITE, BEING SO FIGETY OR RESTLESS THAT YOU HAVE BEEN MOVING AROUND A LOT MORE THAN USUAL: SEVERAL DAYS
SUM OF ALL RESPONSES TO PHQ QUESTIONS 1-9: 11
7. TROUBLE CONCENTRATING ON THINGS, SUCH AS READING THE NEWSPAPER OR WATCHING TELEVISION: NEARLY EVERY DAY
4. FEELING TIRED OR HAVING LITTLE ENERGY: MORE THAN HALF THE DAYS
SUM OF ALL RESPONSES TO PHQ9 QUESTIONS 1 & 2: 2
3. TROUBLE FALLING OR STAYING ASLEEP: NEARLY EVERY DAY

## 2025-02-24 ASSESSMENT — ENCOUNTER SYMPTOMS
GASTROINTESTINAL NEGATIVE: 1
RESPIRATORY NEGATIVE: 1

## 2025-02-24 NOTE — PROGRESS NOTES
[facility]  [address] [place] [address]  Dept: [phone]  Dept Fax: [phone]  Loc: [phone]    Nick Eckert is a 29 y.o. malewho presents today for his medical conditions/complaints as noted below.  Nick Eckertis c/o of Follow-up (Lump on right side of scrotum, may have an in grown hair )      :     HPI      States he had a lump on his right scrotum  New for the last month  Not tender or red, not getting bigger  Sexually active 1 partner does not use condoms  No penile discharge itching or burning  Did have an STI in remote past     State anxiety has worsened when anxiety if high he feels depressed  -denies HI or SI  -has tried meds in past did not like most f them  -wants referred to Psychiatry       Pt Hx of drug abuse, current with opoid addiction Tx with suboxone,  Sees an online provider for this  Had an injury in high school and became addicted to pain meds.    Had been having episodes of gastric pain off and on for 2 years  States he had AM nausea with this,  Did go to UC in past  had an elevated lipase level and dx with pancreatitis  , given IV hydration and pain meds and symptoms resolved.   -no weight loss  -any food make his stomach hurt  -no diarrhea or vomiting  -started on carafate, prilosec and pepcid at last  visit and  referred to GI for f/u , had to reschedule his appt  -states no longer taking prilosec or pepcid or carafate and gastric pain has gone away. Has not been seen by GI, will monitor     -also had a laceration to bottom of right foot in 2020 and now has burning  in his right foot after working all night  -it comes and goes  -denies swelling of his right foot   -given FMLA for it at his last visit and voltaren gel along with  custom molded shoe inserts.  -has not gotten the custom molded shoe inserts yet  -states the voltaren gel helps for a few hours each shift  but pain returns  -discussed elavil

## 2025-03-06 ENCOUNTER — SOCIAL WORK (OUTPATIENT)
Dept: INTERNAL MEDICINE CLINIC | Age: 30
End: 2025-03-06

## 2025-03-06 NOTE — PROGRESS NOTES
Sw attempted to make contact with patient to complete a screening. Sw woke patient, sw asked for patient to call when he was awake.Sw may attempt in one week.

## 2025-03-13 ENCOUNTER — TELEPHONE (OUTPATIENT)
Dept: FAMILY MEDICINE CLINIC | Age: 30
End: 2025-03-13

## 2025-03-13 NOTE — TELEPHONE ENCOUNTER
Left message on answering machine. Requested pt to call back at [phone], at their earliest convenience.

## 2025-03-13 NOTE — TELEPHONE ENCOUNTER
Let pt know [facility] Daniels has denied this referral and recommend pt be seen at  [facility]. Thanks     Dr. Dao has reviewed this referral and declined to schedule at this time. Per Dr. Dao after review of referral: Recommend [facility]. *Referral denied.The patient may contact [facility] directly at [phone]. [facility] also offers open access walk-in hours Monday-Friday. Additional community resources include: [employer] ([place] [phone]), [employer] ([place] [phone]) and [employer] ([place] [phone]). We apologize for any inconvenience this may cause. Please let our office know if you would like a fax containing additional resources available in the area.

## 2025-03-14 NOTE — TELEPHONE ENCOUNTER
Pt informed of referral being denied and the recourses were given to him. He stated he already sees [facility] and will find some other psych care

## 2025-03-14 NOTE — TELEPHONE ENCOUNTER
Left message on answering machine. Requested pt to call back at [phone], at their earliest convenience.

## 2025-04-29 ENCOUNTER — TELEPHONE (OUTPATIENT)
Dept: FAMILY MEDICINE CLINIC | Age: 30
End: 2025-04-29

## 2025-04-29 ENCOUNTER — TELEMEDICINE (OUTPATIENT)
Dept: FAMILY MEDICINE CLINIC | Age: 30
End: 2025-04-29
Payer: COMMERCIAL

## 2025-04-29 DIAGNOSIS — G57.91 NEUROPATHY OF RIGHT FOOT: ICD-10-CM

## 2025-04-29 DIAGNOSIS — F41.9 ANXIETY: ICD-10-CM

## 2025-04-29 DIAGNOSIS — F90.2 ATTENTION DEFICIT HYPERACTIVITY DISORDER (ADHD), COMBINED TYPE: Primary | ICD-10-CM

## 2025-04-29 DIAGNOSIS — F19.10 SUBSTANCE ABUSE (HCC): ICD-10-CM

## 2025-04-29 DIAGNOSIS — F11.20 UNCOMPLICATED OPIOID DEPENDENCE (HCC): ICD-10-CM

## 2025-04-29 PROCEDURE — 99214 OFFICE O/P EST MOD 30 MIN: CPT | Performed by: NURSE PRACTITIONER

## 2025-04-29 RX ORDER — GABAPENTIN 100 MG/1
100 CAPSULE ORAL 2 TIMES DAILY
Qty: 90 CAPSULE | Refills: 1 | Status: SHIPPED | OUTPATIENT
Start: 2025-04-29 | End: 2025-10-26

## 2025-04-29 RX ORDER — ATOMOXETINE 18 MG/1
18 CAPSULE ORAL DAILY
Qty: 30 CAPSULE | Refills: 2 | Status: SHIPPED | OUTPATIENT
Start: 2025-04-29 | End: 2025-05-29

## 2025-04-29 ASSESSMENT — ENCOUNTER SYMPTOMS
GASTROINTESTINAL NEGATIVE: 1
RESPIRATORY NEGATIVE: 1

## 2025-04-29 NOTE — TELEPHONE ENCOUNTER
Please contact pt to set up a 1 month f/u visit at end of May for anxiety/ADHD can be VV if needed

## 2025-04-29 NOTE — TELEPHONE ENCOUNTER
Future Appointments   Date Time Provider Department Center   5/29/2025  7:40 AM Deonte Turner, JOHNATHAN - CNP [facility] Med [facility] DEP

## 2025-04-29 NOTE — PROGRESS NOTES
Refill: 1    Pt in agreement with plan  Risk and benefits of non stimulant ADHD meds discussed along with with SE.   Return in about 4 weeks (around 5/27/2025) for f/u ADHD .    Nick Eckert, was evaluated through a synchronous (real-time) audio-video encounter. The patient (or guardian if applicable) is aware that this is a billable service, which includes applicable co-pays. This Virtual Visit was conducted with patient's (and/or legal guardian's) consent. Patient identification was verified, and a caregiver was present when appropriate.   The patient was located at Home: [address] [place] [id number]  Provider was located at Facility (Appt Dept): [address] [place] [address] [place] [address]  Confirm you are appropriately licensed, registered, or certified to deliver care in the [facility] where the patient is located as indicated above. If you are not or unsure, please re-schedule the visit: Yes, I confirm.       Total time spent on this encounter: Not billed by time    --JOHNATHAN Espinosa - CNP on 4/29/2025 at 11:16 AM    An electronic signature was used to authenticate this note.

## 2025-05-30 ENCOUNTER — TELEPHONE (OUTPATIENT)
Dept: FAMILY MEDICINE CLINIC | Age: 30
End: 2025-05-30

## 2025-05-30 ENCOUNTER — TELEMEDICINE (OUTPATIENT)
Dept: FAMILY MEDICINE CLINIC | Age: 30
End: 2025-05-30
Payer: COMMERCIAL

## 2025-05-30 DIAGNOSIS — F32.A DEPRESSION, UNSPECIFIED DEPRESSION TYPE: ICD-10-CM

## 2025-05-30 DIAGNOSIS — T88.7XXA MEDICATION SIDE EFFECTS: ICD-10-CM

## 2025-05-30 DIAGNOSIS — G57.91 NEUROPATHY OF RIGHT FOOT: ICD-10-CM

## 2025-05-30 DIAGNOSIS — F11.20 UNCOMPLICATED OPIOID DEPENDENCE (HCC): ICD-10-CM

## 2025-05-30 DIAGNOSIS — F90.2 ATTENTION DEFICIT HYPERACTIVITY DISORDER (ADHD), COMBINED TYPE: Primary | ICD-10-CM

## 2025-05-30 DIAGNOSIS — K21.9 GASTROESOPHAGEAL REFLUX DISEASE WITHOUT ESOPHAGITIS: ICD-10-CM

## 2025-05-30 PROBLEM — G62.9 NEUROPATHY: Status: RESOLVED | Noted: 2025-02-24 | Resolved: 2025-05-30

## 2025-05-30 PROCEDURE — 99213 OFFICE O/P EST LOW 20 MIN: CPT | Performed by: NURSE PRACTITIONER

## 2025-05-30 NOTE — PROGRESS NOTES
2025    TELEHEALTH EVALUATION -- Audio/Visual    HPI:    Nick Eckert (:  1995) has requested an audio/video evaluation for the following concern(s):    Pt here to f/u on anxiety     Pt current with a counselor  States he is having a lot of anxiety  Started a new position at work , has to do a lot of paperwork   -he has noticed he can't keep up with the paperwork like the others can   -states in school always got extra help with classes and assignments  -never Dx with ADHD in school   -states in the last 6 months decreased concentration noticed, can't finish projects at home , jumps form one to the other  -feels like he needs to slow himself down    -follows with a dual diagnosis physician, she prescribes his clonidine and suboxone for past drug addiction    -started on straterra 18 mg at last visit 1 month ago here for f/u   -states he has noticed some sexual dysfunction with the straterra   - not improvement in concentration ,   -has tried and failed wellbutrin and lexapro, buspar  and has sexual SE dysfunction,, will refer to psychiatry for further management   -on clonidine   -avoiding stimulant medications due to hx of drug addiction     Recent Performance in School - fair     Hyper active symptoms (Fidgeting, restlessness, excessive talking)?  yes: fidgets, restless, hard to focus, excessive talking   Disruptive symptoms?  yes  Difficulties with attention?  yes  Behavioral problems?  No but does have outbursts   Difficulties with social relationships?  Does not interact with others much, does gt along with co workers   Mood Symptoms?  yes: anxiety,  depression at times if not sleeping well    Difficulty with sleep?  yes: but does shift work   Changes in appetite?  no    Symptoms present prior to age 8?  Unclear   Symptoms present at home?  yes  Symptoms present at school/?  yes: at work   Has patient met developmental milestones?  yes        Continues to take neurontin 100 mg bid for

## 2025-05-30 NOTE — TELEPHONE ENCOUNTER
Spoke to patient to let him know I faxed the referral to psychiatry but he will have to call them to set up an appointment bc they will not call him. He voiced understanding and will call them. No questions at this time

## 2025-06-04 ENCOUNTER — TELEPHONE (OUTPATIENT)
Dept: FAMILY MEDICINE CLINIC | Age: 30
End: 2025-06-04

## 2025-06-04 DIAGNOSIS — F90.2 ATTENTION DEFICIT HYPERACTIVITY DISORDER (ADHD), COMBINED TYPE: ICD-10-CM

## 2025-06-04 DIAGNOSIS — F33.41 RECURRENT MAJOR DEPRESSIVE DISORDER, IN PARTIAL REMISSION: Primary | ICD-10-CM

## 2025-06-23 ENCOUNTER — TELEPHONE (OUTPATIENT)
Dept: FAMILY MEDICINE CLINIC | Age: 30
End: 2025-06-23

## 2025-06-23 NOTE — TELEPHONE ENCOUNTER
Left message on answering machine requesting pt to call back at earliest convenience.     [facility] has been trying to get a hold of patient to schedule him, their number is [phone] for him to call to schedule an appointment

## 2025-06-24 NOTE — TELEPHONE ENCOUNTER
Pt informed of note . Pt voiced understanding with no further questions at this time.     Will call them today

## 2025-07-07 ENCOUNTER — TELEPHONE (OUTPATIENT)
Dept: FAMILY MEDICINE CLINIC | Age: 30
End: 2025-07-07

## 2025-07-07 NOTE — TELEPHONE ENCOUNTER
Patient states that he went over on his FMLA days because of the new ADHD medication. He went over 3 days and normally doesn't have problems but was having stomach issues along with appetite and sexual dysfunction.     Wondering what to do with the FMLA issue?   Anything that we can do to fix it?

## 2025-07-28 ENCOUNTER — OFFICE VISIT (OUTPATIENT)
Dept: FAMILY MEDICINE CLINIC | Age: 30
End: 2025-07-28
Payer: COMMERCIAL

## 2025-07-28 VITALS
HEIGHT: 72 IN | RESPIRATION RATE: 14 BRPM | DIASTOLIC BLOOD PRESSURE: 82 MMHG | WEIGHT: 168.8 LBS | SYSTOLIC BLOOD PRESSURE: 130 MMHG | OXYGEN SATURATION: 97 % | TEMPERATURE: 97.5 F | BODY MASS INDEX: 22.86 KG/M2 | HEART RATE: 96 BPM

## 2025-07-28 DIAGNOSIS — F11.20 UNCOMPLICATED OPIOID DEPENDENCE (HCC): ICD-10-CM

## 2025-07-28 DIAGNOSIS — G57.91 NEUROPATHY OF RIGHT FOOT: Primary | ICD-10-CM

## 2025-07-28 DIAGNOSIS — F32.A DEPRESSION, UNSPECIFIED DEPRESSION TYPE: ICD-10-CM

## 2025-07-28 DIAGNOSIS — R11.10 RETCHING: ICD-10-CM

## 2025-07-28 DIAGNOSIS — R10.13 DYSPEPSIA: ICD-10-CM

## 2025-07-28 PROCEDURE — 99214 OFFICE O/P EST MOD 30 MIN: CPT | Performed by: NURSE PRACTITIONER

## 2025-07-28 RX ORDER — GABAPENTIN 300 MG/1
300 CAPSULE ORAL 2 TIMES DAILY
Qty: 180 CAPSULE | Refills: 1 | Status: SHIPPED | OUTPATIENT
Start: 2025-07-28 | End: 2026-01-24

## 2025-07-28 RX ORDER — OMEPRAZOLE 20 MG/1
20 CAPSULE, DELAYED RELEASE ORAL DAILY
Qty: 180 CAPSULE | Refills: 1 | Status: SHIPPED | OUTPATIENT
Start: 2025-07-28

## 2025-07-28 ASSESSMENT — ENCOUNTER SYMPTOMS
PHOTOPHOBIA: 0
GASTROINTESTINAL NEGATIVE: 1
RESPIRATORY NEGATIVE: 1

## 2025-07-28 NOTE — PROGRESS NOTES
[facility] FAMILY MEDICINE  [address]  [place] [address]  Dept: [phone]  Dept Fax: [phone]  Loc: [phone]    Nick Eckert is a 29 y.o. malewho presents today for his medical conditions/complaints as noted below.  Nick Eckertis c/o of Foot Pain and Abdominal Pain      :     HPI  PMH: Anxiety ,ADHD, neuropathy     Neuropathy  Pt continues to take neurontin 100 mg bid for neuropathy of right foot had been working well but states symptom are getting worse and the gabapentin is no longer working   -also uses voltaren gel  -no foot swelling  -walks a lot on feet   -no swelling of feet but sharp pain with ambulation worse at work       Has tried wellbutrin and did not agree with him     MDD/ADHD  -referred to Psychiatry for further f/u     Having some stomach issues  Eats bland food  Has taken carafate in past and bentyl for stomach pain  Changed his diet but now symptoms are back   Does drink alcohol on occasion   Has a lot of acid wretching at night   Using TUMS    Hx of opoid addiction  Follows with addiction clinic on naloxone     Current Outpatient Medications   Medication Sig Dispense Refill    omeprazole (PRILOSEC) 20 MG delayed release capsule Take 1 capsule by mouth Daily 180 capsule 1    gabapentin (NEURONTIN) 300 MG capsule Take 1 capsule by mouth 2 times daily for 180 days. Intended supply: 90 days 180 capsule 1    diclofenac sodium (VOLTAREN) 1 % GEL Apply 2 g topically 2 times daily 150 g 3    buprenorphine-naloxone (SUBOXONE) 8-2 MG FILM SL film Place 1 Film under the tongue daily.      cloNIDine (CATAPRES) 0.1 MG tablet Take 1 tablet by mouth nightly 30 tablet 0     No current facility-administered medications for this visit.       Past Medical History:   Diagnosis Date    Gastroesophageal reflux disease without esophagitis 8/19/2024    Neuropathy 2/24/2025    Psychiatric problem       History reviewed. No pertinent surgical

## 2025-08-02 LAB
6MAM UR QL: NOT DETECTED NG/ML
ANTICOAGULANTS, URINE: NOT DETECTED
ANTICONVULSANTS, URINE: PRESENT
ANTIDEPRESSANTS UR QL: NOT DETECTED
ANTIDIABETICS, URINE: NOT DETECTED
ANTIHISTAMINES UR QL CFM: NOT DETECTED
ANTIPSYCHOTICS UR QL SCN: NOT DETECTED
APAP UR QL: NOT DETECTED
BENZODIAZ UR QL: NOT DETECTED
BRIVARACETAM, URINE: NOT DETECTED NG/ML
BUPRENORPHINE UR QL: NOT DETECTED NG/ML
CARBAMAZEPINE 10,11-EPOXIDE [MASS/VOLUME] IN URINE: NOT DETECTED NG/ML
CARBAMAZEPINE UR QL: NOT DETECTED NG/ML
CARDIAC, URINE: NOT DETECTED
CODEINE UR QL: NOT DETECTED NG/ML
COMPREHENSIVE DRUG PROMPT: NORMAL
COTININE UR QL: PRESENT NG/ML
CREAT UR-MCNC: 31.5 MG/DL (ref 20–400)
D-METHORPHAN UR QL: NOT DETECTED
DHC UR QL: NOT DETECTED NG/ML
DIHYDRO-10-HYDROXYCARBAMAZEPINE, URINE: NOT DETECTED NG/ML
EDDP UR QL: NOT DETECTED NG/ML
FELBAMATE, URINE: NOT DETECTED NG/ML
FENTANYL UR QL: NOT DETECTED NG/ML
GABAPENTIN UR QL: PRESENT NG/ML
HYDROCODONE UR QL: NOT DETECTED NG/ML
HYDROMORPHONE UR QL: NOT DETECTED NG/ML
LACOSAMIDE [MASS/VOLUME] IN URINE: NOT DETECTED NG/ML
LAMOTRIGINE [MASS/VOLUME] IN URINE BY CONFIRMATORY METHOD: NOT DETECTED NG/ML
LEVETIRACETAM [MASS/VOLUME] IN URINE: NOT DETECTED NG/ML
MEPERIDINE UR QL: NOT DETECTED NG/ML
METHADONE UR QL: NOT DETECTED NG/ML
MORPHINE UR QL: NOT DETECTED NG/ML
MUSCLE RELAXANTS, URINE: NOT DETECTED
NALOXONE URINE: NOT DETECTED NG/ML
NICOTINE UR QL: PRESENT
NORBUPRENORPHINE UR-MCNC: PRESENT NG/ML
NORFENTANYL UR QL: NOT DETECTED NG/ML
NORMEPERIDINE UR QL: NOT DETECTED NG/ML
NORTAPENTADOL UR-MCNC: NOT DETECTED NG/ML
O-NORTRAMADOL UR QL: NOT DETECTED NG/ML
OPIOIDS, URINE: PRESENT
OXYCODONE UR QL: NOT DETECTED NG/ML
OXYMORPHONE UR QL: NOT DETECTED NG/ML
PREGABALIN, URINE: NOT DETECTED NG/ML
PRIMIDONE [MASS/VOLUME] IN URINE: NOT DETECTED NG/ML
RUFINAMIDE, URINE: NOT DETECTED NG/ML
SEDATIVE-HYPNOTICS, URINE: NOT DETECTED
STIMULANTS, URINE: NOT DETECTED
TAPENTADOL UR-MCNC: NOT DETECTED NG/ML
TIAGABINE, URINE: NOT DETECTED NG/ML
TOPIRAMATE [MASS/VOLUME] IN URINE: NOT DETECTED NG/ML
TRAMADOL UR QL: NOT DETECTED NG/ML
ZONISAMIDE, URINE: NOT DETECTED NG/ML

## 2025-08-25 ENCOUNTER — PATIENT MESSAGE (OUTPATIENT)
Dept: FAMILY MEDICINE CLINIC | Age: 30
End: 2025-08-25